# Patient Record
Sex: FEMALE | Race: OTHER | HISPANIC OR LATINO
[De-identification: names, ages, dates, MRNs, and addresses within clinical notes are randomized per-mention and may not be internally consistent; named-entity substitution may affect disease eponyms.]

---

## 2017-01-05 ENCOUNTER — APPOINTMENT (OUTPATIENT)
Dept: INTERNAL MEDICINE | Facility: CLINIC | Age: 42
End: 2017-01-05

## 2017-01-05 ENCOUNTER — LABORATORY RESULT (OUTPATIENT)
Age: 42
End: 2017-01-05

## 2017-01-05 ENCOUNTER — OUTPATIENT (OUTPATIENT)
Dept: OUTPATIENT SERVICES | Facility: HOSPITAL | Age: 42
LOS: 1 days | End: 2017-01-05
Payer: SELF-PAY

## 2017-01-05 VITALS
HEART RATE: 68 BPM | BODY MASS INDEX: 20.96 KG/M2 | SYSTOLIC BLOOD PRESSURE: 120 MMHG | DIASTOLIC BLOOD PRESSURE: 78 MMHG | HEIGHT: 59 IN | WEIGHT: 104 LBS

## 2017-01-05 DIAGNOSIS — J84.10 PULMONARY FIBROSIS, UNSPECIFIED: ICD-10-CM

## 2017-01-05 DIAGNOSIS — L90.5 SCAR CONDITIONS AND FIBROSIS OF SKIN: ICD-10-CM

## 2017-01-05 DIAGNOSIS — R94.8 ABNORMAL RESULTS OF FUNCTION STUDIES OF OTHER ORGANS AND SYSTEMS: ICD-10-CM

## 2017-01-05 DIAGNOSIS — M25.50 PAIN IN UNSPECIFIED JOINT: ICD-10-CM

## 2017-01-05 DIAGNOSIS — Z87.09 PERSONAL HISTORY OF OTHER DISEASES OF THE RESPIRATORY SYSTEM: ICD-10-CM

## 2017-01-05 DIAGNOSIS — I10 ESSENTIAL (PRIMARY) HYPERTENSION: ICD-10-CM

## 2017-01-05 DIAGNOSIS — H53.9 UNSPECIFIED VISUAL DISTURBANCE: ICD-10-CM

## 2017-01-05 DIAGNOSIS — R21 RASH AND OTHER NONSPECIFIC SKIN ERUPTION: ICD-10-CM

## 2017-01-05 DIAGNOSIS — N83.9 NONINFLAMMATORY DISORDER OF OVARY, FALLOPIAN TUBE AND BROAD LIGAMENT, UNSPECIFIED: ICD-10-CM

## 2017-01-05 PROCEDURE — G0463: CPT

## 2017-01-05 PROCEDURE — 86480 TB TEST CELL IMMUN MEASURE: CPT

## 2017-01-06 PROBLEM — N83.9 OVARIAN MASS, RIGHT: Status: ACTIVE | Noted: 2017-01-06

## 2017-01-06 PROBLEM — M25.50 PAIN IN JOINT INVOLVING MULTIPLE SITES: Status: ACTIVE | Noted: 2017-01-06

## 2017-01-06 PROBLEM — H53.9 VISION CHANGES: Status: ACTIVE | Noted: 2017-01-06

## 2017-01-06 PROBLEM — R94.8: Status: ACTIVE | Noted: 2017-01-06

## 2017-01-09 DIAGNOSIS — J84.10 PULMONARY FIBROSIS, UNSPECIFIED: ICD-10-CM

## 2017-01-09 DIAGNOSIS — H53.9 UNSPECIFIED VISUAL DISTURBANCE: ICD-10-CM

## 2017-01-09 DIAGNOSIS — R94.8 ABNORMAL RESULTS OF FUNCTION STUDIES OF OTHER ORGANS AND SYSTEMS: ICD-10-CM

## 2017-01-09 DIAGNOSIS — Q76.7: ICD-10-CM

## 2017-01-09 DIAGNOSIS — Z00.00 ENCOUNTER FOR GENERAL ADULT MEDICAL EXAMINATION WITHOUT ABNORMAL FINDINGS: ICD-10-CM

## 2017-01-09 DIAGNOSIS — M25.50 PAIN IN UNSPECIFIED JOINT: ICD-10-CM

## 2017-01-09 DIAGNOSIS — N83.9 NONINFLAMMATORY DISORDER OF OVARY, FALLOPIAN TUBE AND BROAD LIGAMENT, UNSPECIFIED: ICD-10-CM

## 2017-01-09 DIAGNOSIS — N85.00 ENDOMETRIAL HYPERPLASIA, UNSPECIFIED: ICD-10-CM

## 2017-01-09 LAB
M TB TUBERC IFN-G BLD QL: 0 IU/ML — SIGNIFICANT CHANGE UP
M TB TUBERC IFN-G BLD QL: 0.04 IU/ML — SIGNIFICANT CHANGE UP
M TB TUBERC IFN-G BLD QL: NEGATIVE — SIGNIFICANT CHANGE UP
MITOGEN IGNF BCKGRD COR BLD-ACNC: >10 IU/ML — SIGNIFICANT CHANGE UP

## 2017-04-27 ENCOUNTER — OUTPATIENT (OUTPATIENT)
Dept: OUTPATIENT SERVICES | Facility: HOSPITAL | Age: 42
LOS: 1 days | End: 2017-04-27
Payer: SELF-PAY

## 2017-04-27 ENCOUNTER — APPOINTMENT (OUTPATIENT)
Dept: INTERNAL MEDICINE | Facility: CLINIC | Age: 42
End: 2017-04-27

## 2017-04-27 DIAGNOSIS — I10 ESSENTIAL (PRIMARY) HYPERTENSION: ICD-10-CM

## 2017-04-27 DIAGNOSIS — B35.4 TINEA CORPORIS: ICD-10-CM

## 2017-04-27 PROCEDURE — G0463: CPT

## 2017-04-28 LAB
ALBUMIN SERPL ELPH-MCNC: 4.2 G/DL
ALP BLD-CCNC: 43 U/L
ALT SERPL-CCNC: 14 U/L
ANION GAP SERPL CALC-SCNC: 13 MMOL/L
AST SERPL-CCNC: 19 U/L
BILIRUB SERPL-MCNC: 0.2 MG/DL
BUN SERPL-MCNC: 11 MG/DL
CALCIUM SERPL-MCNC: 9.2 MG/DL
CHLORIDE SERPL-SCNC: 104 MMOL/L
CO2 SERPL-SCNC: 22 MMOL/L
CREAT SERPL-MCNC: 0.74 MG/DL
GLUCOSE SERPL-MCNC: 69 MG/DL
POTASSIUM SERPL-SCNC: 3.7 MMOL/L
PROT SERPL-MCNC: 6.8 G/DL
SODIUM SERPL-SCNC: 139 MMOL/L

## 2017-05-02 ENCOUNTER — APPOINTMENT (OUTPATIENT)
Dept: GASTROENTEROLOGY | Facility: HOSPITAL | Age: 42
End: 2017-05-02

## 2017-05-02 ENCOUNTER — OUTPATIENT (OUTPATIENT)
Dept: OUTPATIENT SERVICES | Facility: HOSPITAL | Age: 42
LOS: 1 days | End: 2017-05-02
Payer: SELF-PAY

## 2017-05-02 VITALS
SYSTOLIC BLOOD PRESSURE: 109 MMHG | DIASTOLIC BLOOD PRESSURE: 74 MMHG | WEIGHT: 105 LBS | RESPIRATION RATE: 16 BRPM | HEART RATE: 54 BPM | BODY MASS INDEX: 21.17 KG/M2 | HEIGHT: 59 IN

## 2017-05-02 DIAGNOSIS — K31.9 DISEASE OF STOMACH AND DUODENUM, UNSPECIFIED: ICD-10-CM

## 2017-05-02 DIAGNOSIS — A04.8 OTHER SPECIFIED BACTERIAL INTESTINAL INFECTIONS: ICD-10-CM

## 2017-05-02 DIAGNOSIS — K30 FUNCTIONAL DYSPEPSIA: ICD-10-CM

## 2017-05-02 PROCEDURE — G0463: CPT

## 2017-05-09 DIAGNOSIS — B35.4 TINEA CORPORIS: ICD-10-CM

## 2017-06-14 ENCOUNTER — FORM ENCOUNTER (OUTPATIENT)
Age: 42
End: 2017-06-14

## 2017-06-15 ENCOUNTER — APPOINTMENT (OUTPATIENT)
Dept: MAMMOGRAPHY | Facility: IMAGING CENTER | Age: 42
End: 2017-06-15

## 2017-06-15 ENCOUNTER — OUTPATIENT (OUTPATIENT)
Dept: OUTPATIENT SERVICES | Facility: HOSPITAL | Age: 42
LOS: 1 days | End: 2017-06-15
Payer: SELF-PAY

## 2017-06-15 ENCOUNTER — APPOINTMENT (OUTPATIENT)
Dept: ULTRASOUND IMAGING | Facility: IMAGING CENTER | Age: 42
End: 2017-06-15

## 2017-06-15 DIAGNOSIS — Z00.8 ENCOUNTER FOR OTHER GENERAL EXAMINATION: ICD-10-CM

## 2017-06-15 DIAGNOSIS — B35.4 TINEA CORPORIS: ICD-10-CM

## 2017-06-15 PROCEDURE — 76642 ULTRASOUND BREAST LIMITED: CPT

## 2017-07-20 ENCOUNTER — APPOINTMENT (OUTPATIENT)
Dept: OBGYN | Facility: CLINIC | Age: 42
End: 2017-07-20

## 2017-07-20 ENCOUNTER — OUTPATIENT (OUTPATIENT)
Dept: OUTPATIENT SERVICES | Facility: HOSPITAL | Age: 42
LOS: 1 days | End: 2017-07-20
Payer: SELF-PAY

## 2017-07-20 VITALS — DIASTOLIC BLOOD PRESSURE: 72 MMHG | SYSTOLIC BLOOD PRESSURE: 110 MMHG | WEIGHT: 110 LBS | BODY MASS INDEX: 22.22 KG/M2

## 2017-07-20 DIAGNOSIS — N76.0 ACUTE VAGINITIS: ICD-10-CM

## 2017-07-20 PROCEDURE — G0463: CPT

## 2017-07-25 DIAGNOSIS — Z01.419 ENCOUNTER FOR GYNECOLOGICAL EXAMINATION (GENERAL) (ROUTINE) WITHOUT ABNORMAL FINDINGS: ICD-10-CM

## 2017-10-12 ENCOUNTER — APPOINTMENT (OUTPATIENT)
Dept: OPHTHALMOLOGY | Facility: CLINIC | Age: 42
End: 2017-10-12

## 2017-12-13 ENCOUNTER — RX RENEWAL (OUTPATIENT)
Age: 42
End: 2017-12-13

## 2017-12-26 ENCOUNTER — FORM ENCOUNTER (OUTPATIENT)
Age: 42
End: 2017-12-26

## 2017-12-27 ENCOUNTER — APPOINTMENT (OUTPATIENT)
Dept: ULTRASOUND IMAGING | Facility: IMAGING CENTER | Age: 42
End: 2017-12-27
Payer: COMMERCIAL

## 2017-12-27 ENCOUNTER — OUTPATIENT (OUTPATIENT)
Dept: OUTPATIENT SERVICES | Facility: HOSPITAL | Age: 42
LOS: 1 days | End: 2017-12-27
Payer: SELF-PAY

## 2017-12-27 DIAGNOSIS — Z00.8 ENCOUNTER FOR OTHER GENERAL EXAMINATION: ICD-10-CM

## 2017-12-27 DIAGNOSIS — N60.01 SOLITARY CYST OF RIGHT BREAST: ICD-10-CM

## 2017-12-27 PROCEDURE — 76641 ULTRASOUND BREAST COMPLETE: CPT

## 2017-12-27 PROCEDURE — 76641 ULTRASOUND BREAST COMPLETE: CPT | Mod: 26,50

## 2018-02-08 ENCOUNTER — APPOINTMENT (OUTPATIENT)
Dept: INTERNAL MEDICINE | Facility: CLINIC | Age: 43
End: 2018-02-08

## 2018-02-08 ENCOUNTER — OUTPATIENT (OUTPATIENT)
Dept: OUTPATIENT SERVICES | Facility: HOSPITAL | Age: 43
LOS: 1 days | End: 2018-02-08
Payer: SELF-PAY

## 2018-02-08 VITALS
BODY MASS INDEX: 22.58 KG/M2 | DIASTOLIC BLOOD PRESSURE: 76 MMHG | SYSTOLIC BLOOD PRESSURE: 110 MMHG | HEART RATE: 64 BPM | HEIGHT: 59 IN | OXYGEN SATURATION: 98 % | WEIGHT: 112 LBS

## 2018-02-08 DIAGNOSIS — I10 ESSENTIAL (PRIMARY) HYPERTENSION: ICD-10-CM

## 2018-02-08 DIAGNOSIS — R10.30 LOWER ABDOMINAL PAIN, UNSPECIFIED: ICD-10-CM

## 2018-02-08 DIAGNOSIS — G47.9 SLEEP DISORDER, UNSPECIFIED: ICD-10-CM

## 2018-02-08 DIAGNOSIS — N85.00 ENDOMETRIAL HYPERPLASIA, UNSPECIFIED: ICD-10-CM

## 2018-02-08 PROCEDURE — 90471 IMMUNIZATION ADMIN: CPT

## 2018-02-08 PROCEDURE — G0463: CPT

## 2018-02-08 PROCEDURE — 90715 TDAP VACCINE 7 YRS/> IM: CPT

## 2018-02-08 RX ORDER — TERBINAFINE HYDROCHLORIDE 250 MG/1
250 TABLET ORAL DAILY
Qty: 14 | Refills: 0 | Status: DISCONTINUED | COMMUNITY
Start: 2017-04-28 | End: 2018-02-08

## 2018-02-20 ENCOUNTER — APPOINTMENT (OUTPATIENT)
Dept: INTERNAL MEDICINE | Facility: CLINIC | Age: 43
End: 2018-02-20

## 2018-02-20 ENCOUNTER — OUTPATIENT (OUTPATIENT)
Dept: OUTPATIENT SERVICES | Facility: HOSPITAL | Age: 43
LOS: 1 days | End: 2018-02-20
Payer: SELF-PAY

## 2018-02-20 DIAGNOSIS — I10 ESSENTIAL (PRIMARY) HYPERTENSION: ICD-10-CM

## 2018-02-20 DIAGNOSIS — F41.9 ANXIETY DISORDER, UNSPECIFIED: ICD-10-CM

## 2018-02-20 PROCEDURE — G0463: CPT

## 2018-02-22 DIAGNOSIS — Z23 ENCOUNTER FOR IMMUNIZATION: ICD-10-CM

## 2018-02-22 DIAGNOSIS — N85.00 ENDOMETRIAL HYPERPLASIA, UNSPECIFIED: ICD-10-CM

## 2018-02-22 DIAGNOSIS — G47.9 SLEEP DISORDER, UNSPECIFIED: ICD-10-CM

## 2018-02-22 DIAGNOSIS — R10.30 LOWER ABDOMINAL PAIN, UNSPECIFIED: ICD-10-CM

## 2018-02-23 ENCOUNTER — OUTPATIENT (OUTPATIENT)
Dept: OUTPATIENT SERVICES | Facility: HOSPITAL | Age: 43
LOS: 1 days | End: 2018-02-23

## 2018-02-23 ENCOUNTER — APPOINTMENT (OUTPATIENT)
Dept: OPHTHALMOLOGY | Facility: CLINIC | Age: 43
End: 2018-02-23

## 2018-03-09 ENCOUNTER — OUTPATIENT (OUTPATIENT)
Dept: OUTPATIENT SERVICES | Facility: HOSPITAL | Age: 43
LOS: 1 days | End: 2018-03-09
Payer: SELF-PAY

## 2018-03-09 ENCOUNTER — APPOINTMENT (OUTPATIENT)
Dept: INTERNAL MEDICINE | Facility: CLINIC | Age: 43
End: 2018-03-09

## 2018-03-09 VITALS
HEIGHT: 59 IN | SYSTOLIC BLOOD PRESSURE: 152 MMHG | HEART RATE: 67 BPM | DIASTOLIC BLOOD PRESSURE: 82 MMHG | WEIGHT: 113 LBS | OXYGEN SATURATION: 97 % | BODY MASS INDEX: 22.78 KG/M2

## 2018-03-09 DIAGNOSIS — I10 ESSENTIAL (PRIMARY) HYPERTENSION: ICD-10-CM

## 2018-03-09 DIAGNOSIS — Z83.3 FAMILY HISTORY OF DIABETES MELLITUS: ICD-10-CM

## 2018-03-09 LAB
ALBUMIN SERPL ELPH-MCNC: 4.5 G/DL
ALP BLD-CCNC: 38 U/L
ALT SERPL-CCNC: 16 U/L
ANION GAP SERPL CALC-SCNC: 11 MMOL/L
AST SERPL-CCNC: 29 U/L
BASOPHILS # BLD AUTO: 0.06 K/UL
BASOPHILS NFR BLD AUTO: 1 %
BILIRUB SERPL-MCNC: 0.3 MG/DL
BUN SERPL-MCNC: 8 MG/DL
CALCIUM SERPL-MCNC: 9.1 MG/DL
CHLORIDE SERPL-SCNC: 100 MMOL/L
CHOLEST SERPL-MCNC: 178 MG/DL
CHOLEST/HDLC SERPL: 2.9 RATIO
CO2 SERPL-SCNC: 25 MMOL/L
CREAT SERPL-MCNC: 0.67 MG/DL
EOSINOPHIL # BLD AUTO: 0.26 K/UL
EOSINOPHIL NFR BLD AUTO: 4.3 %
GLUCOSE SERPL-MCNC: 80 MG/DL
HCT VFR BLD CALC: 39.4 %
HDLC SERPL-MCNC: 62 MG/DL
HGB BLD-MCNC: 13.4 G/DL
IMM GRANULOCYTES NFR BLD AUTO: 0 %
LDLC SERPL CALC-MCNC: 104 MG/DL
LYMPHOCYTES # BLD AUTO: 2.24 K/UL
LYMPHOCYTES NFR BLD AUTO: 36.7 %
MAN DIFF?: NORMAL
MCHC RBC-ENTMCNC: 31.3 PG
MCHC RBC-ENTMCNC: 34 GM/DL
MCV RBC AUTO: 92.1 FL
MONOCYTES # BLD AUTO: 0.38 K/UL
MONOCYTES NFR BLD AUTO: 6.2 %
NEUTROPHILS # BLD AUTO: 3.17 K/UL
NEUTROPHILS NFR BLD AUTO: 51.8 %
PLATELET # BLD AUTO: 310 K/UL
POTASSIUM SERPL-SCNC: 4 MMOL/L
PROT SERPL-MCNC: 7.7 G/DL
RBC # BLD: 4.28 M/UL
RBC # FLD: 12.7 %
SODIUM SERPL-SCNC: 136 MMOL/L
TRIGL SERPL-MCNC: 58 MG/DL
WBC # FLD AUTO: 6.11 K/UL

## 2018-03-09 PROCEDURE — G0463: CPT

## 2018-03-12 LAB
CCP AB SER IA-ACNC: <8 UNITS
ERYTHROCYTE [SEDIMENTATION RATE] IN BLOOD BY WESTERGREN METHOD: 2 MM/HR
HBA1C MFR BLD HPLC: 4.9 %
HCV AB SER QL: NONREACTIVE
HCV S/CO RATIO: 0.18 S/CO
HIV1+2 AB SPEC QL IA.RAPID: NONREACTIVE
RF+CCP IGG SER-IMP: NEGATIVE
RHEUMATOID FACT SER QL: <7 IU/ML

## 2018-03-14 DIAGNOSIS — H52.4 PRESBYOPIA: ICD-10-CM

## 2018-03-15 ENCOUNTER — LABORATORY RESULT (OUTPATIENT)
Age: 43
End: 2018-03-15

## 2018-03-15 ENCOUNTER — OUTPATIENT (OUTPATIENT)
Dept: OUTPATIENT SERVICES | Facility: HOSPITAL | Age: 43
LOS: 1 days | End: 2018-03-15
Payer: SELF-PAY

## 2018-03-15 ENCOUNTER — APPOINTMENT (OUTPATIENT)
Dept: INTERNAL MEDICINE | Facility: CLINIC | Age: 43
End: 2018-03-15

## 2018-03-15 VITALS
BODY MASS INDEX: 22.78 KG/M2 | OXYGEN SATURATION: 99 % | HEIGHT: 59 IN | TEMPERATURE: 98.8 F | SYSTOLIC BLOOD PRESSURE: 130 MMHG | HEART RATE: 86 BPM | DIASTOLIC BLOOD PRESSURE: 70 MMHG | WEIGHT: 113 LBS

## 2018-03-15 DIAGNOSIS — I10 ESSENTIAL (PRIMARY) HYPERTENSION: ICD-10-CM

## 2018-03-15 PROCEDURE — G0463: CPT

## 2018-03-16 LAB — TSH SERPL-ACNC: 2.23 UIU/ML

## 2018-03-21 DIAGNOSIS — M19.90 UNSPECIFIED OSTEOARTHRITIS, UNSPECIFIED SITE: ICD-10-CM

## 2018-03-21 DIAGNOSIS — F41.9 ANXIETY DISORDER, UNSPECIFIED: ICD-10-CM

## 2018-03-22 ENCOUNTER — FORM ENCOUNTER (OUTPATIENT)
Age: 43
End: 2018-03-22

## 2018-03-23 ENCOUNTER — OUTPATIENT (OUTPATIENT)
Dept: OUTPATIENT SERVICES | Facility: HOSPITAL | Age: 43
LOS: 1 days | End: 2018-03-23
Payer: SELF-PAY

## 2018-03-23 ENCOUNTER — APPOINTMENT (OUTPATIENT)
Dept: INTERNAL MEDICINE | Facility: CLINIC | Age: 43
End: 2018-03-23
Payer: SELF-PAY

## 2018-03-23 ENCOUNTER — APPOINTMENT (OUTPATIENT)
Dept: RADIOLOGY | Facility: CLINIC | Age: 43
End: 2018-03-23
Payer: COMMERCIAL

## 2018-03-23 DIAGNOSIS — I10 ESSENTIAL (PRIMARY) HYPERTENSION: ICD-10-CM

## 2018-03-23 DIAGNOSIS — M54.9 DORSALGIA, UNSPECIFIED: ICD-10-CM

## 2018-03-23 DIAGNOSIS — M17.0 BILATERAL PRIMARY OSTEOARTHRITIS OF KNEE: ICD-10-CM

## 2018-03-23 PROCEDURE — 72100 X-RAY EXAM L-S SPINE 2/3 VWS: CPT

## 2018-03-23 PROCEDURE — 73130 X-RAY EXAM OF HAND: CPT | Mod: 26,50

## 2018-03-23 PROCEDURE — G0463: CPT

## 2018-03-23 PROCEDURE — 73130 X-RAY EXAM OF HAND: CPT

## 2018-03-23 PROCEDURE — 72100 X-RAY EXAM L-S SPINE 2/3 VWS: CPT | Mod: 26

## 2018-03-23 PROCEDURE — ZZZZZ: CPT

## 2018-03-23 PROCEDURE — 72070 X-RAY EXAM THORAC SPINE 2VWS: CPT | Mod: 26

## 2018-03-23 PROCEDURE — 72070 X-RAY EXAM THORAC SPINE 2VWS: CPT

## 2018-03-30 DIAGNOSIS — F41.9 ANXIETY DISORDER, UNSPECIFIED: ICD-10-CM

## 2018-04-20 ENCOUNTER — OUTPATIENT (OUTPATIENT)
Dept: OUTPATIENT SERVICES | Facility: HOSPITAL | Age: 43
LOS: 1 days | End: 2018-04-20
Payer: SELF-PAY

## 2018-04-20 ENCOUNTER — APPOINTMENT (OUTPATIENT)
Dept: INTERNAL MEDICINE | Facility: CLINIC | Age: 43
End: 2018-04-20

## 2018-04-20 VITALS
HEART RATE: 68 BPM | WEIGHT: 111 LBS | SYSTOLIC BLOOD PRESSURE: 114 MMHG | DIASTOLIC BLOOD PRESSURE: 72 MMHG | HEIGHT: 59 IN | BODY MASS INDEX: 22.38 KG/M2 | OXYGEN SATURATION: 97 %

## 2018-04-20 DIAGNOSIS — I10 ESSENTIAL (PRIMARY) HYPERTENSION: ICD-10-CM

## 2018-04-20 DIAGNOSIS — R09.82 POSTNASAL DRIP: ICD-10-CM

## 2018-04-20 DIAGNOSIS — M17.0 BILATERAL PRIMARY OSTEOARTHRITIS OF KNEE: ICD-10-CM

## 2018-04-20 PROCEDURE — G0463: CPT

## 2018-04-25 DIAGNOSIS — F41.9 ANXIETY DISORDER, UNSPECIFIED: ICD-10-CM

## 2018-04-25 DIAGNOSIS — R09.82 POSTNASAL DRIP: ICD-10-CM

## 2018-04-25 DIAGNOSIS — M19.90 UNSPECIFIED OSTEOARTHRITIS, UNSPECIFIED SITE: ICD-10-CM

## 2018-04-27 ENCOUNTER — APPOINTMENT (OUTPATIENT)
Dept: RHEUMATOLOGY | Facility: HOSPITAL | Age: 43
End: 2018-04-27

## 2018-04-27 ENCOUNTER — OUTPATIENT (OUTPATIENT)
Dept: OUTPATIENT SERVICES | Facility: HOSPITAL | Age: 43
LOS: 1 days | End: 2018-04-27
Payer: SELF-PAY

## 2018-04-27 VITALS
DIASTOLIC BLOOD PRESSURE: 71 MMHG | WEIGHT: 111 LBS | HEART RATE: 61 BPM | SYSTOLIC BLOOD PRESSURE: 108 MMHG | BODY MASS INDEX: 22.38 KG/M2 | HEIGHT: 59 IN

## 2018-04-27 DIAGNOSIS — M06.9 RHEUMATOID ARTHRITIS, UNSPECIFIED: ICD-10-CM

## 2018-04-27 PROCEDURE — G0463: CPT

## 2018-04-30 ENCOUNTER — APPOINTMENT (OUTPATIENT)
Dept: PULMONOLOGY | Facility: CLINIC | Age: 43
End: 2018-04-30
Payer: COMMERCIAL

## 2018-04-30 VITALS
DIASTOLIC BLOOD PRESSURE: 70 MMHG | BODY MASS INDEX: 22.38 KG/M2 | HEART RATE: 67 BPM | TEMPERATURE: 99.2 F | HEIGHT: 59 IN | RESPIRATION RATE: 15 BRPM | WEIGHT: 111 LBS | SYSTOLIC BLOOD PRESSURE: 110 MMHG

## 2018-04-30 DIAGNOSIS — F51.04 PSYCHOPHYSIOLOGIC INSOMNIA: ICD-10-CM

## 2018-04-30 PROCEDURE — 99244 OFF/OP CNSLTJ NEW/EST MOD 40: CPT | Mod: GC

## 2018-05-02 ENCOUNTER — APPOINTMENT (OUTPATIENT)
Dept: PULMONOLOGY | Facility: CLINIC | Age: 43
End: 2018-05-02

## 2018-05-07 DIAGNOSIS — M79.7 FIBROMYALGIA: ICD-10-CM

## 2018-05-07 DIAGNOSIS — M17.0 BILATERAL PRIMARY OSTEOARTHRITIS OF KNEE: ICD-10-CM

## 2018-05-26 ENCOUNTER — MOBILE ON CALL (OUTPATIENT)
Age: 43
End: 2018-05-26
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
## 2018-05-26 DIAGNOSIS — T78.3XXA ANGIONEUROTIC EDEMA, INITIAL ENCOUNTER: ICD-10-CM

## 2018-05-26 RX ORDER — METHYLPREDNISOLONE 4 MG/1
4 TABLET ORAL
Qty: 1 | Refills: 0 | Status: COMPLETED | COMMUNITY
Start: 2018-05-26 | End: 2018-06-02

## 2018-06-01 ENCOUNTER — LABORATORY RESULT (OUTPATIENT)
Age: 43
End: 2018-06-01

## 2018-06-01 ENCOUNTER — OUTPATIENT (OUTPATIENT)
Dept: OUTPATIENT SERVICES | Facility: HOSPITAL | Age: 43
LOS: 1 days | End: 2018-06-01
Payer: SELF-PAY

## 2018-06-01 ENCOUNTER — APPOINTMENT (OUTPATIENT)
Dept: INTERNAL MEDICINE | Facility: CLINIC | Age: 43
End: 2018-06-01
Payer: SELF-PAY

## 2018-06-01 DIAGNOSIS — I10 ESSENTIAL (PRIMARY) HYPERTENSION: ICD-10-CM

## 2018-06-01 PROCEDURE — G0463: CPT

## 2018-06-01 PROCEDURE — 84550 ASSAY OF BLOOD/URIC ACID: CPT

## 2018-06-01 PROCEDURE — ZZZZZ: CPT

## 2018-06-01 PROCEDURE — 83655 ASSAY OF LEAD: CPT

## 2018-06-01 PROCEDURE — 86617 LYME DISEASE ANTIBODY: CPT

## 2018-06-01 PROCEDURE — 86618 LYME DISEASE ANTIBODY: CPT

## 2018-06-02 LAB — URATE SERPL-MCNC: 3.9 MG/DL — SIGNIFICANT CHANGE UP (ref 2.5–7)

## 2018-06-03 LAB
B BURGDOR C6 AB SER-ACNC: NEGATIVE — SIGNIFICANT CHANGE UP
B BURGDOR IGG+IGM SER-ACNC: 0.05 INDEX — SIGNIFICANT CHANGE UP (ref 0.01–0.89)

## 2018-06-05 LAB — LEAD BLD-MCNC: <1 UG/DL — SIGNIFICANT CHANGE UP (ref 0–19)

## 2018-06-08 DIAGNOSIS — F41.9 ANXIETY DISORDER, UNSPECIFIED: ICD-10-CM

## 2018-06-08 LAB
B BURGDOR AB SER-IMP: NEGATIVE — SIGNIFICANT CHANGE UP
B BURGDOR18KD IGG SER QL IB: SIGNIFICANT CHANGE UP
B BURGDOR23KD IGG SER QL IB: SIGNIFICANT CHANGE UP
B BURGDOR23KD IGM SER QL IB: PRESENT
B BURGDOR28KD AB SER QL IB: SIGNIFICANT CHANGE UP
B BURGDOR28KD IGG SER QL IB: SIGNIFICANT CHANGE UP
B BURGDOR30KD AB SER QL IB: SIGNIFICANT CHANGE UP
B BURGDOR30KD IGG SER QL IB: PRESENT
B BURGDOR31KD IGG SER QL IB: SIGNIFICANT CHANGE UP
B BURGDOR31KD IGM SER QL IB: SIGNIFICANT CHANGE UP
B BURGDOR39KD IGG SER QL IB: SIGNIFICANT CHANGE UP
B BURGDOR39KD IGM SER QL IB: SIGNIFICANT CHANGE UP
B BURGDOR41KD IGG SER QL IB: SIGNIFICANT CHANGE UP
B BURGDOR41KD IGM SER QL IB: SIGNIFICANT CHANGE UP
B BURGDOR45KD AB SER QL IB: SIGNIFICANT CHANGE UP
B BURGDOR45KD IGG SER QL IB: SIGNIFICANT CHANGE UP
B BURGDOR58KD AB SER QL IB: SIGNIFICANT CHANGE UP
B BURGDOR58KD IGG SER QL IB: SIGNIFICANT CHANGE UP
B BURGDOR66KD IGG SER QL IB: SIGNIFICANT CHANGE UP
B BURGDOR66KD IGM SER QL IB: SIGNIFICANT CHANGE UP
B BURGDOR93KD IGG SER QL IB: SIGNIFICANT CHANGE UP
B BURGDOR93KD IGM SER QL IB: SIGNIFICANT CHANGE UP
LYME AB WESTERN BLOT 18KD IGM: SIGNIFICANT CHANGE UP
LYME WB IGM INTERPRETATION: NEGATIVE — SIGNIFICANT CHANGE UP

## 2018-06-29 ENCOUNTER — APPOINTMENT (OUTPATIENT)
Dept: RHEUMATOLOGY | Facility: HOSPITAL | Age: 43
End: 2018-06-29

## 2018-06-29 ENCOUNTER — OUTPATIENT (OUTPATIENT)
Dept: OUTPATIENT SERVICES | Facility: HOSPITAL | Age: 43
LOS: 1 days | End: 2018-06-29
Payer: SELF-PAY

## 2018-06-29 VITALS
HEIGHT: 59 IN | WEIGHT: 110 LBS | HEART RATE: 53 BPM | SYSTOLIC BLOOD PRESSURE: 108 MMHG | BODY MASS INDEX: 22.18 KG/M2 | DIASTOLIC BLOOD PRESSURE: 75 MMHG

## 2018-06-29 DIAGNOSIS — M06.9 RHEUMATOID ARTHRITIS, UNSPECIFIED: ICD-10-CM

## 2018-06-29 DIAGNOSIS — L23.9 ALLERGIC CONTACT DERMATITIS, UNSPECIFIED CAUSE: ICD-10-CM

## 2018-06-29 PROCEDURE — G0463: CPT

## 2018-06-29 RX ORDER — MELOXICAM 15 MG/1
15 TABLET ORAL DAILY
Qty: 30 | Refills: 1 | Status: DISCONTINUED | COMMUNITY
Start: 2018-03-09 | End: 2018-06-29

## 2018-06-29 RX ORDER — FLUTICASONE PROPIONATE 50 UG/1
50 SPRAY, METERED NASAL DAILY
Qty: 1 | Refills: 3 | Status: DISCONTINUED | COMMUNITY
Start: 2018-04-20 | End: 2018-06-29

## 2018-06-29 RX ORDER — ASCORBIC ACID 500 MG
500 TABLET ORAL
Refills: 0 | Status: DISCONTINUED | COMMUNITY
Start: 2018-02-08 | End: 2018-06-29

## 2018-08-23 ENCOUNTER — FORM ENCOUNTER (OUTPATIENT)
Age: 43
End: 2018-08-23

## 2018-08-24 ENCOUNTER — APPOINTMENT (OUTPATIENT)
Dept: MAMMOGRAPHY | Facility: IMAGING CENTER | Age: 43
End: 2018-08-24
Payer: COMMERCIAL

## 2018-08-24 ENCOUNTER — OUTPATIENT (OUTPATIENT)
Dept: OUTPATIENT SERVICES | Facility: HOSPITAL | Age: 43
LOS: 1 days | End: 2018-08-24
Payer: SELF-PAY

## 2018-08-24 ENCOUNTER — APPOINTMENT (OUTPATIENT)
Dept: ULTRASOUND IMAGING | Facility: IMAGING CENTER | Age: 43
End: 2018-08-24
Payer: COMMERCIAL

## 2018-08-24 DIAGNOSIS — N60.01 SOLITARY CYST OF RIGHT BREAST: ICD-10-CM

## 2018-08-24 PROCEDURE — 77066 DX MAMMO INCL CAD BI: CPT | Mod: 26

## 2018-08-24 PROCEDURE — 76642 ULTRASOUND BREAST LIMITED: CPT

## 2018-08-24 PROCEDURE — G0279: CPT

## 2018-08-24 PROCEDURE — 76642 ULTRASOUND BREAST LIMITED: CPT | Mod: 26,LT

## 2018-08-24 PROCEDURE — G0279: CPT | Mod: 26

## 2018-08-24 PROCEDURE — 77066 DX MAMMO INCL CAD BI: CPT

## 2018-09-06 ENCOUNTER — APPOINTMENT (OUTPATIENT)
Dept: INTERNAL MEDICINE | Facility: CLINIC | Age: 43
End: 2018-09-06

## 2018-09-06 ENCOUNTER — OUTPATIENT (OUTPATIENT)
Dept: OUTPATIENT SERVICES | Facility: HOSPITAL | Age: 43
LOS: 1 days | End: 2018-09-06
Payer: SELF-PAY

## 2018-09-06 VITALS
HEIGHT: 59 IN | SYSTOLIC BLOOD PRESSURE: 102 MMHG | HEART RATE: 58 BPM | DIASTOLIC BLOOD PRESSURE: 60 MMHG | WEIGHT: 111 LBS | OXYGEN SATURATION: 98 % | BODY MASS INDEX: 22.38 KG/M2

## 2018-09-06 DIAGNOSIS — I10 ESSENTIAL (PRIMARY) HYPERTENSION: ICD-10-CM

## 2018-09-06 PROCEDURE — G0463: CPT

## 2018-09-06 NOTE — REVIEW OF SYSTEMS
[Muscle Weakness] : muscle weakness [Muscle Pain] : muscle pain [Fever] : no fever [Chills] : no chills [Fatigue] : no fatigue [Chest Pain] : no chest pain [Palpitations] : no palpitations [Shortness Of Breath] : no shortness of breath [Cough] : no cough [Abdominal Pain] : no abdominal pain [Nausea] : no nausea [Joint Stiffness] : no joint stiffness [Joint Swelling] : no joint swelling [FreeTextEntry9] : Muscle and joint pain is present but less.

## 2018-09-06 NOTE — PHYSICAL EXAM
[No Acute Distress] : no acute distress [Well Nourished] : well nourished [Well Developed] : well developed [Well-Appearing] : well-appearing [No Respiratory Distress] : no respiratory distress  [Clear to Auscultation] : lungs were clear to auscultation bilaterally [No Accessory Muscle Use] : no accessory muscle use [Normal Rate] : normal rate  [Regular Rhythm] : with a regular rhythm [Normal S1, S2] : normal S1 and S2 [No Murmur] : no murmur heard [Soft] : abdomen soft [Non Tender] : non-tender [Non-distended] : non-distended [No HSM] : no HSM [No Joint Swelling] : no joint swelling [de-identified] : Knees show no erythema or swelling.

## 2018-09-06 NOTE — ASSESSMENT
[FreeTextEntry1] : 42F presenting for generalized aches of now found to be fibromyalgia.\par \par # Fibromyalgia\par Pt presents for follow up for fibromyalgia, now improved with exercise.  Pt is reluctant to accept diagnosis as she is afraid it will make her "feel sick all the time".  Discussed with pt about the disease and how there are treatments for pain and the disease.  Pt expressed understanding.\par - Encouraged pt to continue exercise as this is helping\par - Discussed options for SSRI/TCA or CBT today, however pt is not open to this at this time\par - Given that pt is improving will continue current therapy.\par - Pt states she did stop seeing Dr. Colleti as she was getting better, however worry that this might have been premature.  Will discuss with Dr. Colleti.\par - Given association with colder weather and isolation, pt should return to clinic in November (10 wks) for follow up to ensure symptoms are not returning.\par - Pt also has associated fatigue which is better with sleep hygene and exercise.\par \par # Acne\par Pt has adult acne, few comedones seen on face.\par - Encouraged pt to use OTC remedies and that this was normal with stress.\par \par \par # HCM\par - Pt due for pap, GYN referral given today\par - RTC in 10 weeks\par Discussed with Dr. Conner

## 2018-09-06 NOTE — HISTORY OF PRESENT ILLNESS
[de-identified] : 42F PMH R ovarian cyst, breast cyst presents for follow up.  Pt has a number of issues including fibromyalgia diagnosed recently and insomnia.  \par \par # Fibromyalgia\par Pt was last seen by rheumatology in June and since has been excercising more.  She states that since starting to exercise more, her pain has been better.  She states the pain is best right after she starts to exercise.  \par \par # Insomnia\par Pt states that she has been sleeping more at night, 5-6 hrs.  She states she is taking less melatonin now and has seen improvements.  She does have nights when she does not sleep a lot, however this is less frequent than before\par \par # Acne\par Pt is worried about acne on her face.

## 2018-09-14 DIAGNOSIS — R93.5 ABNORMAL FINDINGS ON DIAGNOSTIC IMAGING OF OTHER ABDOMINAL REGIONS, INCLUDING RETROPERITONEUM: ICD-10-CM

## 2018-10-01 ENCOUNTER — LABORATORY RESULT (OUTPATIENT)
Age: 43
End: 2018-10-01

## 2018-10-02 ENCOUNTER — APPOINTMENT (OUTPATIENT)
Dept: OBGYN | Facility: CLINIC | Age: 43
End: 2018-10-02
Payer: COMMERCIAL

## 2018-10-02 ENCOUNTER — OUTPATIENT (OUTPATIENT)
Dept: OUTPATIENT SERVICES | Facility: HOSPITAL | Age: 43
LOS: 1 days | End: 2018-10-02
Payer: SELF-PAY

## 2018-10-02 ENCOUNTER — LABORATORY RESULT (OUTPATIENT)
Age: 43
End: 2018-10-02

## 2018-10-02 VITALS — DIASTOLIC BLOOD PRESSURE: 70 MMHG | BODY MASS INDEX: 22.82 KG/M2 | WEIGHT: 113 LBS | SYSTOLIC BLOOD PRESSURE: 104 MMHG

## 2018-10-02 DIAGNOSIS — N76.0 ACUTE VAGINITIS: ICD-10-CM

## 2018-10-02 PROCEDURE — 99396 PREV VISIT EST AGE 40-64: CPT

## 2018-10-02 PROCEDURE — G0463: CPT

## 2018-10-02 PROCEDURE — 87491 CHLMYD TRACH DNA AMP PROBE: CPT

## 2018-10-02 PROCEDURE — 87624 HPV HI-RISK TYP POOLED RSLT: CPT

## 2018-10-02 PROCEDURE — 87591 N.GONORRHOEAE DNA AMP PROB: CPT

## 2018-10-02 PROCEDURE — 87800 DETECT AGNT MULT DNA DIREC: CPT

## 2018-10-03 LAB
C TRACH RRNA SPEC QL NAA+PROBE: SIGNIFICANT CHANGE UP
CANDIDA AB TITR SER: SIGNIFICANT CHANGE UP
G VAGINALIS DNA SPEC QL NAA+PROBE: DETECTED
HPV HIGH+LOW RISK DNA PNL CVX: SIGNIFICANT CHANGE UP
N GONORRHOEA RRNA SPEC QL NAA+PROBE: SIGNIFICANT CHANGE UP
SPECIMEN SOURCE: SIGNIFICANT CHANGE UP
T VAGINALIS SPEC QL WET PREP: SIGNIFICANT CHANGE UP

## 2018-10-05 ENCOUNTER — MESSAGE (OUTPATIENT)
Age: 43
End: 2018-10-05

## 2018-10-05 LAB — CYTOLOGY SPEC DOC CYTO: SIGNIFICANT CHANGE UP

## 2018-10-15 DIAGNOSIS — Z01.419 ENCOUNTER FOR GYNECOLOGICAL EXAMINATION (GENERAL) (ROUTINE) WITHOUT ABNORMAL FINDINGS: ICD-10-CM

## 2019-01-12 ENCOUNTER — EMERGENCY (EMERGENCY)
Facility: HOSPITAL | Age: 44
LOS: 1 days | Discharge: HOME | End: 2019-01-12
Attending: EMERGENCY MEDICINE
Payer: MEDICAID

## 2019-01-12 VITALS
HEIGHT: 63 IN | TEMPERATURE: 100 F | HEART RATE: 110 BPM | SYSTOLIC BLOOD PRESSURE: 130 MMHG | DIASTOLIC BLOOD PRESSURE: 72 MMHG | OXYGEN SATURATION: 99 % | RESPIRATION RATE: 20 BRPM | WEIGHT: 110.01 LBS

## 2019-01-12 VITALS
OXYGEN SATURATION: 100 % | HEART RATE: 82 BPM | DIASTOLIC BLOOD PRESSURE: 60 MMHG | SYSTOLIC BLOOD PRESSURE: 101 MMHG | TEMPERATURE: 100 F | RESPIRATION RATE: 16 BRPM

## 2019-01-12 LAB
ALBUMIN SERPL ELPH-MCNC: 4.7 G/DL — SIGNIFICANT CHANGE UP (ref 3.3–5)
ALP SERPL-CCNC: 50 U/L — SIGNIFICANT CHANGE UP (ref 40–120)
ALT FLD-CCNC: 14 U/L — SIGNIFICANT CHANGE UP (ref 10–45)
ANION GAP SERPL CALC-SCNC: 14 MMOL/L — SIGNIFICANT CHANGE UP (ref 5–17)
AST SERPL-CCNC: 16 U/L — SIGNIFICANT CHANGE UP (ref 10–40)
BASOPHILS # BLD AUTO: 0 K/UL — SIGNIFICANT CHANGE UP (ref 0–0.2)
BASOPHILS NFR BLD AUTO: 0.1 % — SIGNIFICANT CHANGE UP (ref 0–2)
BILIRUB SERPL-MCNC: 0.5 MG/DL — SIGNIFICANT CHANGE UP (ref 0.2–1.2)
BUN SERPL-MCNC: 6 MG/DL — LOW (ref 7–23)
CALCIUM SERPL-MCNC: 9.3 MG/DL — SIGNIFICANT CHANGE UP (ref 8.4–10.5)
CHLORIDE SERPL-SCNC: 100 MMOL/L — SIGNIFICANT CHANGE UP (ref 96–108)
CO2 SERPL-SCNC: 22 MMOL/L — SIGNIFICANT CHANGE UP (ref 22–31)
CREAT SERPL-MCNC: 0.69 MG/DL — SIGNIFICANT CHANGE UP (ref 0.5–1.3)
EOSINOPHIL # BLD AUTO: 0 K/UL — SIGNIFICANT CHANGE UP (ref 0–0.5)
EOSINOPHIL NFR BLD AUTO: 0.1 % — SIGNIFICANT CHANGE UP (ref 0–6)
GAS PNL BLDV: SIGNIFICANT CHANGE UP
GLUCOSE SERPL-MCNC: 122 MG/DL — HIGH (ref 70–99)
HCT VFR BLD CALC: 41 % — SIGNIFICANT CHANGE UP (ref 34.5–45)
HGB BLD-MCNC: 14.1 G/DL — SIGNIFICANT CHANGE UP (ref 11.5–15.5)
LYMPHOCYTES # BLD AUTO: 1 K/UL — SIGNIFICANT CHANGE UP (ref 1–3.3)
LYMPHOCYTES # BLD AUTO: 5.8 % — LOW (ref 13–44)
MCHC RBC-ENTMCNC: 31.1 PG — SIGNIFICANT CHANGE UP (ref 27–34)
MCHC RBC-ENTMCNC: 34.3 GM/DL — SIGNIFICANT CHANGE UP (ref 32–36)
MCV RBC AUTO: 90.7 FL — SIGNIFICANT CHANGE UP (ref 80–100)
MONOCYTES # BLD AUTO: 1 K/UL — HIGH (ref 0–0.9)
MONOCYTES NFR BLD AUTO: 5.8 % — SIGNIFICANT CHANGE UP (ref 2–14)
NEUTROPHILS # BLD AUTO: 14.6 K/UL — HIGH (ref 1.8–7.4)
NEUTROPHILS NFR BLD AUTO: 88.2 % — HIGH (ref 43–77)
PLATELET # BLD AUTO: 270 K/UL — SIGNIFICANT CHANGE UP (ref 150–400)
POTASSIUM SERPL-MCNC: 3.5 MMOL/L — SIGNIFICANT CHANGE UP (ref 3.5–5.3)
POTASSIUM SERPL-SCNC: 3.5 MMOL/L — SIGNIFICANT CHANGE UP (ref 3.5–5.3)
PROT SERPL-MCNC: 8.3 G/DL — SIGNIFICANT CHANGE UP (ref 6–8.3)
RBC # BLD: 4.52 M/UL — SIGNIFICANT CHANGE UP (ref 3.8–5.2)
RBC # FLD: 11.2 % — SIGNIFICANT CHANGE UP (ref 10.3–14.5)
SODIUM SERPL-SCNC: 136 MMOL/L — SIGNIFICANT CHANGE UP (ref 135–145)
WBC # BLD: 16.5 K/UL — HIGH (ref 3.8–10.5)
WBC # FLD AUTO: 16.5 K/UL — HIGH (ref 3.8–10.5)

## 2019-01-12 PROCEDURE — 84132 ASSAY OF SERUM POTASSIUM: CPT

## 2019-01-12 PROCEDURE — 84295 ASSAY OF SERUM SODIUM: CPT

## 2019-01-12 PROCEDURE — 82947 ASSAY GLUCOSE BLOOD QUANT: CPT

## 2019-01-12 PROCEDURE — 82330 ASSAY OF CALCIUM: CPT

## 2019-01-12 PROCEDURE — 99284 EMERGENCY DEPT VISIT MOD MDM: CPT

## 2019-01-12 PROCEDURE — 80053 COMPREHEN METABOLIC PANEL: CPT

## 2019-01-12 PROCEDURE — 99284 EMERGENCY DEPT VISIT MOD MDM: CPT | Mod: 25

## 2019-01-12 PROCEDURE — 83605 ASSAY OF LACTIC ACID: CPT

## 2019-01-12 PROCEDURE — 96374 THER/PROPH/DIAG INJ IV PUSH: CPT | Mod: XU

## 2019-01-12 PROCEDURE — 85014 HEMATOCRIT: CPT

## 2019-01-12 PROCEDURE — 82803 BLOOD GASES ANY COMBINATION: CPT

## 2019-01-12 PROCEDURE — 85027 COMPLETE CBC AUTOMATED: CPT

## 2019-01-12 PROCEDURE — 96375 TX/PRO/DX INJ NEW DRUG ADDON: CPT

## 2019-01-12 PROCEDURE — 70487 CT MAXILLOFACIAL W/DYE: CPT

## 2019-01-12 PROCEDURE — 82435 ASSAY OF BLOOD CHLORIDE: CPT

## 2019-01-12 PROCEDURE — 70487 CT MAXILLOFACIAL W/DYE: CPT | Mod: 26

## 2019-01-12 RX ORDER — ONDANSETRON 8 MG/1
4 TABLET, FILM COATED ORAL ONCE
Qty: 0 | Refills: 0 | Status: COMPLETED | OUTPATIENT
Start: 2019-01-12 | End: 2019-01-12

## 2019-01-12 RX ORDER — IBUPROFEN 200 MG
600 TABLET ORAL ONCE
Qty: 0 | Refills: 0 | Status: DISCONTINUED | OUTPATIENT
Start: 2019-01-12 | End: 2019-01-12

## 2019-01-12 RX ORDER — ACETAMINOPHEN 500 MG
325 TABLET ORAL ONCE
Qty: 0 | Refills: 0 | Status: COMPLETED | OUTPATIENT
Start: 2019-01-12 | End: 2019-01-12

## 2019-01-12 RX ORDER — ONDANSETRON 8 MG/1
1 TABLET, FILM COATED ORAL
Qty: 12 | Refills: 0
Start: 2019-01-12 | End: 2019-01-14

## 2019-01-12 RX ORDER — KETOROLAC TROMETHAMINE 30 MG/ML
15 SYRINGE (ML) INJECTION ONCE
Qty: 0 | Refills: 0 | Status: DISCONTINUED | OUTPATIENT
Start: 2019-01-12 | End: 2019-01-12

## 2019-01-12 RX ORDER — SODIUM CHLORIDE 9 MG/ML
1000 INJECTION, SOLUTION INTRAVENOUS ONCE
Qty: 0 | Refills: 0 | Status: COMPLETED | OUTPATIENT
Start: 2019-01-12 | End: 2019-01-12

## 2019-01-12 RX ORDER — ACETAMINOPHEN 500 MG
650 TABLET ORAL ONCE
Qty: 0 | Refills: 0 | Status: COMPLETED | OUTPATIENT
Start: 2019-01-12 | End: 2019-01-12

## 2019-01-12 RX ADMIN — Medication 325 MILLIGRAM(S): at 14:22

## 2019-01-12 RX ADMIN — Medication 1 TABLET(S): at 16:00

## 2019-01-12 RX ADMIN — Medication 15 MILLIGRAM(S): at 12:42

## 2019-01-12 RX ADMIN — Medication 325 MILLIGRAM(S): at 12:48

## 2019-01-12 RX ADMIN — SODIUM CHLORIDE 1000 MILLILITER(S): 9 INJECTION, SOLUTION INTRAVENOUS at 12:11

## 2019-01-12 RX ADMIN — Medication 15 MILLIGRAM(S): at 14:23

## 2019-01-12 RX ADMIN — Medication 650 MILLIGRAM(S): at 12:48

## 2019-01-12 RX ADMIN — Medication 650 MILLIGRAM(S): at 12:11

## 2019-01-12 RX ADMIN — ONDANSETRON 4 MILLIGRAM(S): 8 TABLET, FILM COATED ORAL at 15:56

## 2019-01-12 NOTE — ED PROVIDER NOTE - ATTENDING CONTRIBUTION TO CARE
Patient is a 44 yo F with history of fibromyalgia here for evaluation of upper lip and facial swelling, noted this morning. Patient states she had tooth sensitivity on Thursday, saw a dentist who evaluated her and did not find a cause for her sensitivity. She states she has a history of root canal in her front incisor and has a small hole in the back of the tooth. She states the dentist did an XRAY and did not find any abnormalities. Since then, patient has had progressive pain in her gums and had a subjective fever last night. She woke up this morning to find her upper lip swollen. + nausea. No vomiting, diarrhea. She states her fibromyalgia pain has been well controlled. She notes she had URI symptoms this week and has been drinking a lot of frances tea. She tried tylenol last night for pain which helped. Denies prior episodes. Patient states she is not taking any new medications or herbal medications. She denies a family history of hereditary angioedema (symptoms explained). Denies prior episodes.     VS noted  Gen. no acute distress, Non toxic   HEENT: EOMI, mmm, tooth number 8 with small hole in back - no dental caries noted. minimal ttp to tooth number 8. airway intact - pharynx w/o erythema or exudate, no cervical or submandibular lymphadenopathy, ttp in right nasolabial fold  Lungs: CTAB/L no C/ W /R   CVS: RRR   Abd; Soft non tender, non distended   Ext: no edema  Skin: no rash  Neuro AAOx3 non focal clear speech  a/p: upper lip facial swelling - ? dental origin. r/o dental abscess. angioedema on differential but seems less likely given no family or personal history or new medications. Will get CT Max/ Face and consult dental. Will give pain meds and check labs.   - Elsie MEYERS

## 2019-01-12 NOTE — ED PROVIDER NOTE - NSFOLLOWUPINSTRUCTIONS_ED_ALL_ED_FT
If you have any severe increase in pain, fever, uncontrollable nausea vomiting, or inability to tolerate eating and drinking you need to come right back to the emergency room.     The dentist will see you in 2 days to remove the drain. Call them on Monday morning to get the time. You need to come immediately back to the emergency room if you have any swelling of your tongue, difficulty breathing or swallowing.     You will need to take the antibiotics that we sent to [facility] for the next 10 days.

## 2019-01-12 NOTE — CONSULT NOTE ADULT - SUBJECTIVE AND OBJECTIVE BOX
Patient is a 43y old  Female who presents with a chief complaint of facial swelling.    HPI: Pt reports she went to the dentist on Tuesday with pain around front maxillary central incisors (#8 and 9). Reports dentist took an xray and found no periapical pathology associated with the tooth and sent her home. Pt reports pain had increased and swelling worsened since Tuesday. Has been taking tylenol for the pain and reports it has not been helping.      PAST MEDICAL & SURGICAL HISTORY:  H pylori ulcer  No significant past surgical history    (   ) heart valve replacement  (   ) joint replacement  (   ) pregnancy    MEDICATIONS  (STANDING):    MEDICATIONS  (PRN):      Allergies    No Known Allergies    Intolerances        FAMILY HISTORY:  No pertinent family history in first degree relatives      *SOCIAL HISTORY: (guardian or who pt came with), (smoking hx)    *Last Dental Visit:    Vital Signs Last 24 Hrs  T(C): 37.7 (12 Jan 2019 10:57), Max: 37.7 (12 Jan 2019 10:57)  T(F): 99.9 (12 Jan 2019 10:57), Max: 99.9 (12 Jan 2019 10:57)  HR: 110 (12 Jan 2019 10:57) (110 - 110)  BP: 130/72 (12 Jan 2019 10:57) (130/72 - 130/72)  BP(mean): --  RR: 20 (12 Jan 2019 10:57) (20 - 20)  SpO2: 99% (12 Jan 2019 10:57) (99% - 99%)    EOE:  TMJ (   ) clicks                    (    ) pops                    (    ) crepitus             Mandible <<FROM>>             Facial bones and MOM <<grossly intact>>             (   ) trismus             (   ) LAD             (   ) swelling             (   ) asymmetry             (   ) palpation             (   ) SOB             (   ) dysphagia             (   ) LOC    IOE:  <<permanent/primary/mixed>> dentition: <<grossly intact>> OR <<multiple carious teeth>> OR <<multiple missing teeth>>           hard/soft palate:  (   ) palatal torus           tongue/FOM <<WNL>>           labial/buccal mucosa <<WNL>>           (   ) percussion           (   ) palpation           (   ) swelling     Dentition present: <<   >>  Mobility: <<  >>  Caries: <<   >>     Radiographs:    LABS:                        14.1   16.5  )-----------( 270      ( 12 Jan 2019 12:29 )             41.0     01-12    136  |  100  |  6<L>  ----------------------------<  122<H>  3.5   |  22  |  0.69    Ca    9.3      12 Jan 2019 12:29    TPro  8.3  /  Alb  4.7  /  TBili  0.5  /  DBili  x   /  AST  16  /  ALT  14  /  AlkPhos  50  01-12    WBC Count: 16.5 K/uL <H> [3.8 - 10.5] (01-12 @ 12:29)    Platelet Count - Automated: 270 K/uL [150 - 400] (01-12 @ 12:29)              RADIOLOGY & ADDITIONAL STUDIES:    ASSESSMENT:    PROCEDURE:  Verbal and written consent given.     RECOMMENDATIONS:   1) <<   >>  2) Dental F/U with outpatient dentist for comprehensive dental care.   3) If any difficulty swallowing/breathing, fever occur, page dental.     Resident Name, pager #  Oral surgeon consulted: Patient is a 43y old  Female who presents with a chief complaint of facial swelling.    HPI: Pt reports she went to the dentist on Tuesday with pain around front maxillary central incisors (#8 and 9). Reports dentist took an xray and found no periapical pathology associated with the tooth and sent her home. Pt reports pain had increased and swelling worsened since Tuesday. Has been taking tylenol for the pain and reports it has been helping minimally.      PAST MEDICAL & SURGICAL HISTORY:  H pylori ulcer  No significant past surgical history    Allergies: No Known Allergies    FAMILY HISTORY:  No pertinent family history in first degree relatives    *SOCIAL HISTORY: (guardian or who pt came with), (smoking hx)    *Last Dental Visit: Tuesday 1/8/2019    Vital Signs Last 24 Hrs  T(C): 37.7 (12 Jan 2019 10:57), Max: 37.7 (12 Jan 2019 10:57)  T(F): 99.9 (12 Jan 2019 10:57), Max: 99.9 (12 Jan 2019 10:57)  HR: 110 (12 Jan 2019 10:57) (110 - 110)  BP: 130/72 (12 Jan 2019 10:57) (130/72 - 130/72)  BP(mean): --  RR: 20 (12 Jan 2019 10:57) (20 - 20)  SpO2: 99% (12 Jan 2019 10:57) (99% - 99%)    EOE:  TMJ ( -  ) clicks                    ( -   ) pops                    ( -   ) crepitus             Mandible FROM             Facial bones and MOM grossly intact             ( -  ) trismus             ( + ) LAD             ( + ) swelling             ( + ) asymmetry             ( + ) palpation, area around upper lip is warm to touch and tender to palpation.    IOE:  permanent grossly in tact with multiple missing teeth, mulitple restorations and root canal treated teeth. #8 (right maxillary central incisor) was previously treated with a root canal and veneer over 10 years ago. (+) pain on percussion and palpation of #8. (-) pain on percussion and palpation of #7, 9 and 10.       Radiographs: 1 panoramic radiograph and 1 periapical radiograph taken and reviewed. Root canal around tooth #8 is 2 mm short. Possible periapical radiolucency with possible pathology at site #8 (maxillary central incisor on right side).    LABS:                        14.1   16.5  )-----------( 270      ( 12 Jan 2019 12:29 )             41.0     01-12    136  |  100  |  6<L>  ----------------------------<  122<H>  3.5   |  22  |  0.69    Ca    9.3      12 Jan 2019 12:29    TPro  8.3  /  Alb  4.7  /  TBili  0.5  /  DBili  x   /  AST  16  /  ALT  14  /  AlkPhos  50  01-12    WBC Count: 16.5 K/uL <H> [3.8 - 10.5] (01-12 @ 12:29)    Platelet Count - Automated: 270 K/uL [150 - 400] (01-12 @ 12:29)    ASSESSMENT: 43 year old female with no significant PMH reports to ED with facial swelling around maxillary central incisors. Radiographic and clinical findings show recurrent infection associated with tooth #8 (right max central incisor). Incision and drainage of site #8 indicated at this time.     PROCEDURE:    Verbal and written consent given.   Administered 2 carpules of 2% lidocaine with 1:100k epi and 1 carpule of 3% carbocaine as local infiltration apical to #8 (right max central incisor).   Incision made in vestibular fold apical to #8 with blade. Opened up infectious space with periosteal elevator and hand instruments.  Culture sensitivity taken.  Copiously irrigated site with sterile saline solution.   Placed penrose drain in incision site and approximated to adjacent tissue with 1 4.0 silk suture.  Hemsotasis achieved.  EBL 5 cc.  POIG.   Gave pt clinic information and informed pt that she should report to clinic to have drain removed in 2 days (monday 1/14/2019), Pt understands.    RECOMMENDATIONS:   1) Antibiotic therapy as per medical team  2) Return to [facility] at entrance 5 for drain removal in 2 days  3) Dental F/U with outpatient dentist for comprehensive dental care.   4) If any difficulty swallowing/breathing, fever occur, return to ER.     Mary Goodrich DDS #[phone]  Oral surgeon consulted: Dr. Mckeon Patient is a 43y old  Female who presents with a chief complaint of facial swelling.    HPI: Pt reports she went to the dentist on Tuesday with pain around maxillary central incisors (#8 and 9). Reports dentist took an xray and found no periapical pathology associated with either of her front teeth and sent her home. Pt reports pain and swelling have worsened since Tuesday. Has been taking tylenol for the pain and reports it has been helping minimally.      PAST MEDICAL & SURGICAL HISTORY:  H pylori ulcer  No significant past surgical history    Allergies: No Known Allergies    FAMILY HISTORY:  No pertinent family history in first degree relatives    *SOCIAL HISTORY: (guardian or who pt came with), (smoking hx)    *Last Dental Visit: Tuesday 1/8/2019    Vital Signs Last 24 Hrs  T(C): 37.7 (12 Jan 2019 10:57), Max: 37.7 (12 Jan 2019 10:57)  T(F): 99.9 (12 Jan 2019 10:57), Max: 99.9 (12 Jan 2019 10:57)  HR: 110 (12 Jan 2019 10:57) (110 - 110)  BP: 130/72 (12 Jan 2019 10:57) (130/72 - 130/72)  BP(mean): --  RR: 20 (12 Jan 2019 10:57) (20 - 20)  SpO2: 99% (12 Jan 2019 10:57) (99% - 99%)    EOE:  TMJ ( -  ) clicks                    ( -   ) pops                    ( -   ) crepitus             Mandible FROM             Facial bones and MOM grossly intact             ( -  ) trismus             ( + ) LAD             ( + ) swelling             ( + ) asymmetry             ( + ) palpation, area around upper lip is warm to touch and tender to palpation.    IOE:  permanent dentition grossly in tact with multiple missing teeth, multiple restorations and root canal treated teeth. #8 (right maxillary central incisor) was previously treated with a root canal and veneer over 10 years ago. (+) pain on percussion and palpation of #8. (-) pain on percussion and palpation of #7, 9 and 10.     Radiographs: 1 panoramic radiograph and 1 periapical radiograph taken and reviewed. Root canal around tooth #8 is 2 mm short. Possible periapical radiolucency with possible pathology at site #8 (maxillary central incisor on right side).    LABS:                        14.1   16.5  )-----------( 270      ( 12 Jan 2019 12:29 )             41.0     01-12    136  |  100  |  6<L>  ----------------------------<  122<H>  3.5   |  22  |  0.69    Ca    9.3      12 Jan 2019 12:29    TPro  8.3  /  Alb  4.7  /  TBili  0.5  /  DBili  x   /  AST  16  /  ALT  14  /  AlkPhos  50  01-12    WBC Count: 16.5 K/uL <H> [3.8 - 10.5] (01-12 @ 12:29)    Platelet Count - Automated: 270 K/uL [150 - 400] (01-12 @ 12:29)    ASSESSMENT: 43 year old female with no significant PMH reports to ED with facial swelling around maxillary central incisors. Radiographic and clinical findings show recurrent infection associated with tooth #8 (right max central incisor). Incision and drainage of site #8 indicated at this time.     PROCEDURE:    Verbal and written consent given.   Administered 2 carpules of 2% lidocaine with 1:100k epi and 1 carpule of 3% carbocaine as local infiltration apical to #8 (right max central incisor).   Incision made in vestibular fold apical to #8 with blade. Opened up infectious space with periosteal elevator and hand instruments.  Culture sensitivity taken.  Copiously irrigated site with sterile saline solution.   Placed penrose drain in incision site and approximated to adjacent tissue with 1 4.0 silk suture.  Hemsotasis achieved.  EBL 5 cc.  POIG.   Gave pt clinic information and informed pt that she should report to clinic to have drain removed in 2 days (monday 1/14/2019), Pt understands.    RECOMMENDATIONS:   1) Antibiotic therapy as per medical team  2) Return to [facility] clinic at entrance 5 for drain removal in 2 days  3) Dental F/U with outpatient dentist for comprehensive dental care.   4) If any difficulty swallowing/breathing, fever occur, return to ER.     Mary Goodrich DDS #[phone]  Oral surgeon consulted: Dr. Mckeon

## 2019-01-12 NOTE — ED PROVIDER NOTE - PHYSICAL EXAMINATION
Face: There is mild swelling of the upper lip and bilateral soft tissue overlying the left zygoma across to the right without any involvement of the lower lip or tongue.  Mouth: There is TTP of the teeth on the upper right jaw, no obvious periapical abscess, gums appear normal without any signs of infection. No tongue swelling.

## 2019-01-12 NOTE — ED PROVIDER NOTE - OBJECTIVE STATEMENT
Pt is a 43 Y F with no pmhx presents with pain and swelling of her upper lip and face. Pt states that last tue she went to her dentist for pain in her gum line on the upper right. She said that xrays were done and she was told nothing was wrong and sent home. Since then the pain has persisted however on Thu she developed swelling of her right upper lip and cheek, the swelling got worse and occurred on the right as well and she decided to come in. She also notes that she felt warm last night but didn't check her temperature. She notes nausea without vomiting as well. She denies SOB, painful swallowing, cough, nasal congestion, vision changes, hearing changes.

## 2019-01-12 NOTE — ED ADULT NURSE NOTE - OBJECTIVE STATEMENT
43 year old female no PMHX presents to the ED complaining of facial swelling, As Per patient she had some upper jaw tooth sensitivity and had an xray by her dentist who told her there was nothing there, Pt states she woke up this morning with swelling of her cheeks and upper lip, extending to the underside of right eye. Pt is A&O x 4, VSS, afebrile, ambulating independently. Pt denies fever, chills, NVD, SOB, or chest pain. Pt shows no SS of respiratory distress, Pt is speaking clearly, no difficulty swallowing.

## 2019-01-12 NOTE — ED PROVIDER NOTE - PROGRESS NOTE DETAILS
Resident: Gustavo Timmons - spoke with dental and they drained an abscess and placed a loop. They stated that the pt could be discharged on PO antibiotics and follow up in 2 days to have the drain removed. Resident: Gustavo Duarte DC was in for pt pending PO dose of abx. She had episode of emesis prior to abx, will give zofran and PO dose, if she cannot tolerate will call CDU. Resident: Gustavo Timmons – Pt was re-evaluated at bedside, VSS no longer febrile or tachycardic, feeling better overall. Discussed CDU if pt feels uncomfortable about going home tonight. Pt states that she would like to go home and get abx from her pharmacy in the morning. Results were discussed with patient as well as return precautions and follow up plan with PCP and/or specialist. Time was taken to answer any questions that the patient had before providing them with discharge paperwork.

## 2019-01-12 NOTE — ED ADULT NURSE NOTE - NSIMPLEMENTINTERV_GEN_ALL_ED
Implemented All Universal Safety Interventions:  [place] to call system. Call bell, personal items and telephone within reach. Instruct patient to call for assistance. Room bathroom lighting operational. Non-slip footwear when patient is off stretcher. Physically safe environment: no spills, clutter or unnecessary equipment. Stretcher in lowest position, wheels locked, appropriate side rails in place.

## 2019-01-12 NOTE — ED PROVIDER NOTE - MEDICAL DECISION MAKING DETAILS
43 Y F with swelling of the BL midface with possible recent dental infection still with some dental pain on exam. DDx: Natali-apical abscess, severe sinusitis, hereditary angioedema. Will get basic labs, pain control, CT max face and dental consult. Dispo pending workup.

## 2019-01-12 NOTE — ED PROVIDER NOTE - NSFOLLOWUPCLINICS_GEN_ALL_ED_FT
[facility]  Dental  [address] [place], [place] [address]  Phone: [phone]  Fax:   Follow Up Time: 1-3 Days

## 2019-02-20 ENCOUNTER — OUTPATIENT (OUTPATIENT)
Dept: OUTPATIENT SERVICES | Facility: HOSPITAL | Age: 44
LOS: 1 days | End: 2019-02-20
Payer: SELF-PAY

## 2019-02-20 ENCOUNTER — APPOINTMENT (OUTPATIENT)
Dept: INTERNAL MEDICINE | Facility: CLINIC | Age: 44
End: 2019-02-20

## 2019-02-20 DIAGNOSIS — I10 ESSENTIAL (PRIMARY) HYPERTENSION: ICD-10-CM

## 2019-02-20 PROCEDURE — G0463: CPT

## 2019-02-20 NOTE — ASSESSMENT
[FreeTextEntry1] : #Breast Mass\par - Pt has History of breast cysts with BIRADS 2 lesion in left breast on mammogram/US in April 2018. \par - b/l breast ultrasound for further characterization of tender nodules palpated on exam\par \par #Post Nasal Drip\par - Pt reporting sensation of mucus in her throat causing cough and occasional clear mucus production. Associated water eyes\par - Pt to try PRN OTC antihistamine (cetirizine). \par \par #Dental Abscess with associated headache\par - Pt to followup with dental given continued facial pain\par - Pt to take meloxicam daily for one week given headache Sx \par

## 2019-02-20 NOTE — REVIEW OF SYSTEMS
[Postnasal Drip] : postnasal drip [Cough] : cough [Headache] : headache [Negative] : Musculoskeletal [Pain] : no pain [Redness] : no redness [Dryness] : no dryness  [Vision Problems] : no vision problems [Earache] : no earache [Hearing Loss] : no hearing loss [Nasal Discharge] : no nasal discharge [Shortness Of Breath] : no shortness of breath [Wheezing] : no wheezing [Dyspnea on Exertion] : no dyspnea on exertion [Dizziness] : no dizziness [Fainting] : no fainting [Confusion] : no confusion [FreeTextEntry3] : +OTC Zyrtec for PND and eye drips\par OTC Zyrtec for PND and eye drips\par +epiphora  [FreeTextEntry4] : +sensation of mucus in throat, +dental/facial pain

## 2019-02-20 NOTE — PHYSICAL EXAM
[No Acute Distress] : no acute distress [Well Nourished] : well nourished [Well Developed] : well developed [Well-Appearing] : well-appearing [Normal Sclera/Conjunctiva] : normal sclera/conjunctiva [PERRL] : pupils equal round and reactive to light [EOMI] : extraocular movements intact [Normal Outer Ear/Nose] : the outer ears and nose were normal in appearance [Normal Oropharynx] : the oropharynx was normal [Supple] : supple [No Lymphadenopathy] : no lymphadenopathy [No Respiratory Distress] : no respiratory distress  [Clear to Auscultation] : lungs were clear to auscultation bilaterally [Normal Rate] : normal rate  [Regular Rhythm] : with a regular rhythm [Normal Appearance] : normal in appearance [No Nipple Discharge] : no nipple discharge [No Axillary Lymphadenopathy] : no axillary lymphadenopathy [Normal Gait] : normal gait [Coordination Grossly Intact] : coordination grossly intact [Speech Grossly Normal] : speech grossly normal [Normal Affect] : the affect was normal [Normal Mood] : the mood was normal [de-identified] : No erythema or swelling near labial frenulum  [de-identified] : +tender nodule at 2 O'clock in left breast, +tender nodules at 9 O'clock in right breast

## 2019-02-20 NOTE — HISTORY OF PRESENT ILLNESS
[FreeTextEntry1] : Painful Breast Masses [de-identified] : Ms. Arce is a 42y/o F with PMH of R ovarian cyst, breast cyst, fibromyalgia, and acne  presents for follow up. Pt was last seen in clinic in September 2018 for followup. \par \par Last month patient was seen in [facility] ED for dental abscess that was drained by dental at bedside with loop placement. She was discharged on Augmentin with instructions to follow up with dental to have loop removed and drainage site evaluated. Called patient to follow up, no answer. Provided patient with number for dental clinic in voicemail and instructed to make follow up ASAP. \par \par Pt reports she followed up with dental for root canal. Five days after root canal she developed a "pimple" in her moth at the site of the abscess. Pt then saw another dentist who recommended observation Vs repeat drainage. Last week pt went back to the first dentist who performed X-rays and saw no complications from the root canal. \par - pt reports associated headache that worsens her insomnia and fibromyalgia . \par - no fever/chills\par - reports post nasal drip causing sensation of mucus in throat since November\par \par Breast cysts\par - pt last had mammogram and ultrasound in April of 2018: BIRADS 2, cyst at 2 O'clock position in left breast\par - pt reports cysts have gotten bigger in recent weeks and become increasingly painful\par - 2-3 wks ago Right breast cysts enlarged: previously had breast pain with menstrual cycles, but now constant\par - no discharge, no skin changes

## 2019-02-27 DIAGNOSIS — N63.0 UNSPECIFIED LUMP IN UNSPECIFIED BREAST: ICD-10-CM

## 2019-03-05 ENCOUNTER — FORM ENCOUNTER (OUTPATIENT)
Age: 44
End: 2019-03-05

## 2019-03-06 ENCOUNTER — OUTPATIENT (OUTPATIENT)
Dept: OUTPATIENT SERVICES | Facility: HOSPITAL | Age: 44
LOS: 1 days | End: 2019-03-06
Payer: SELF-PAY

## 2019-03-06 ENCOUNTER — APPOINTMENT (OUTPATIENT)
Dept: ULTRASOUND IMAGING | Facility: IMAGING CENTER | Age: 44
End: 2019-03-06

## 2019-03-06 ENCOUNTER — APPOINTMENT (OUTPATIENT)
Dept: MAMMOGRAPHY | Facility: IMAGING CENTER | Age: 44
End: 2019-03-06
Payer: COMMERCIAL

## 2019-03-06 DIAGNOSIS — N63.0 UNSPECIFIED LUMP IN UNSPECIFIED BREAST: ICD-10-CM

## 2019-03-06 PROCEDURE — 76641 ULTRASOUND BREAST COMPLETE: CPT | Mod: 26,50

## 2019-03-06 PROCEDURE — 76641 ULTRASOUND BREAST COMPLETE: CPT

## 2019-04-03 ENCOUNTER — APPOINTMENT (OUTPATIENT)
Dept: INTERNAL MEDICINE | Facility: CLINIC | Age: 44
End: 2019-04-03

## 2019-04-03 VITALS
BODY MASS INDEX: 21.97 KG/M2 | HEART RATE: 94 BPM | OXYGEN SATURATION: 98 % | DIASTOLIC BLOOD PRESSURE: 70 MMHG | WEIGHT: 109 LBS | HEIGHT: 59 IN | SYSTOLIC BLOOD PRESSURE: 102 MMHG

## 2019-04-03 RX ORDER — METRONIDAZOLE 7.5 MG/G
0.75 GEL VAGINAL
Qty: 1 | Refills: 0 | Status: DISCONTINUED | COMMUNITY
Start: 2018-10-05 | End: 2019-04-03

## 2019-04-03 RX ORDER — MELOXICAM 7.5 MG/1
7.5 TABLET ORAL TWICE DAILY
Qty: 60 | Refills: 1 | Status: DISCONTINUED | COMMUNITY
Start: 2018-06-29 | End: 2019-04-03

## 2019-04-04 NOTE — HISTORY OF PRESENT ILLNESS
[de-identified] : 42F PMH R ovarian cyst, breast cyst, fibromyalgia presents for CPE.  \par \par Pt had newly diagnosed right front tooth abscess.\par \par # Tooth abscess\par Pt states she has been having pain on the right side of her face since Jan 2019.  Pt states she went for a root canal after which she developed an abscess that required draining.  Pt has been following with [facility] for surgical drainage.  Pt states she has been having right sided headache, ear pain that is sharp since the root canal.  Denies fevers associated.  Denies chills.  Denies tinnitus.   \par Pt is still taking amoxicillin (has 2 days left) and ibuprofen for pain\par \par # Fibromyalgia\par Pt states she has been having less pain this last winter.  Knee pain and shoulder pain is resolved.  Pt had been exercising prior to winter, however has not been as active since winter started.

## 2019-04-04 NOTE — PHYSICAL EXAM
[No Acute Distress] : no acute distress [Well Nourished] : well nourished [Well Developed] : well developed [Well-Appearing] : well-appearing [Normal Sclera/Conjunctiva] : normal sclera/conjunctiva [PERRL] : pupils equal round and reactive to light [EOMI] : extraocular movements intact [Fundoscopic Exam Performed] : fundoscopic ~T exam ~C was performed [Normal Outer Ear/Nose] : the outer ears and nose were normal in appearance [Normal Oropharynx] : the oropharynx was normal [Normal TMs] : both tympanic membranes were normal [Normal Nasal Mucosa] : the nasal mucosa was normal [No JVD] : no jugular venous distention [Supple] : supple [No Lymphadenopathy] : no lymphadenopathy [Thyroid Normal, No Nodules] : the thyroid was normal and there were no nodules present [No Respiratory Distress] : no respiratory distress  [Clear to Auscultation] : lungs were clear to auscultation bilaterally [No Accessory Muscle Use] : no accessory muscle use [Normal Rate] : normal rate  [Regular Rhythm] : with a regular rhythm [Normal S1, S2] : normal S1 and S2 [No Murmur] : no murmur heard [No Carotid Bruits] : no carotid bruits [No Abdominal Bruit] : a ~M bruit was not heard ~T in the abdomen [No Varicosities] : no varicosities [Pedal Pulses Present] : the pedal pulses are present [No Edema] : there was no peripheral edema [No Extremity Clubbing/Cyanosis] : no extremity clubbing/cyanosis [No Palpable Aorta] : no palpable aorta [Soft] : abdomen soft [Non Tender] : non-tender [Non-distended] : non-distended [No Masses] : no abdominal mass palpated [No HSM] : no HSM [Normal Bowel Sounds] : normal bowel sounds [Normal Axillary Nodes] : no axillary lymphadenopathy [Normal Posterior Cervical Nodes] : no posterior cervical lymphadenopathy [Normal Anterior Cervical Nodes] : no anterior cervical lymphadenopathy [Normal Inguinal Nodes] : no inguinal lymphadenopathy [No CVA Tenderness] : no CVA  tenderness [No Spinal Tenderness] : no spinal tenderness [No Joint Swelling] : no joint swelling [Grossly Normal Strength/Tone] : grossly normal strength/tone [No Rash] : no rash [Normal Gait] : normal gait [Coordination Grossly Intact] : coordination grossly intact [Normal Affect] : the affect was normal [Alert and Oriented x3] : oriented to person, place, and time [Normal Insight/Judgement] : insight and judgment were intact [de-identified] : maxillary and erythoid sinus tender on right, no mucous noted in sinuses [de-identified] : tender to palpation on right base of neck [de-identified] : breasts with multiple round masses palpable on right > left; right with one mass in outer upper quadrant ~2cm. [de-identified] : left arm with one 2mm dark spot, raised.

## 2019-04-04 NOTE — ASSESSMENT
[FreeTextEntry1] : 43F PMH breast cysts and fibromyalgia presenting for CPE.\par \par # Tooth abscess\par Pt having pain in distruction of trigmeninal nerve, likely related to right tooth abscess\par - encouraged f/u with OMFS\par - pt will try meloxicam for pain\par - rtc if pain does not subside\par \par # Breast cyst\par Pt has enlarging breast cysts on right today.\par - Pt had US in Jan showing enlarging cyst as well\par - encouraged pt to follow with breast clinic, referral given\par \par # Mole\par Pt states she has been having some moles that she has noticed are new.  Examined the one she was concerned about on her left upper arm, which is not abnormal (no discoloration, even borders, raised, no change per patient).\par - Referral given for derm, however pt states she does not want to go to derm at this time.\par - Will encourage visit to ensure no other abnormal lesions\par - Encourage sunscreen use.\par \par # Fibromyalgia\par Pt now with better control on fibromyalgia, no issues today.\par - encouraged to c/w exercise.\par \par # HCM\par - Pt had pap in Oct 2018, neg, HPV neg; due in 2021\par - Mammo  referral given today, done last aug 2018 Birads 2\par - RTC in 1 year or prn\par - declined blood work today, given that lipids, cmp and cbc were wnl, will defer at this time.\par \par Discussed with Dr. Bonilla.

## 2019-04-04 NOTE — REVIEW OF SYSTEMS
[Headache] : headache [Fever] : no fever [Chills] : no chills [Fatigue] : no fatigue [Hot Flashes] : no hot flashes [Discharge] : no discharge [Pain] : no pain [Redness] : no redness [Dryness] : no dryness  [Vision Problems] : no vision problems [Itching] : no itching [Earache] : no earache [Hearing Loss] : no hearing loss [Nosebleed] : no nosebleeds [Chest Pain] : no chest pain [Palpitations] : no palpitations [Orthopnea] : no orthopnea [Paroysmal Nocturnal Dyspnea] : no paroysmal nocturnal dyspnea [Abdominal Pain] : no abdominal pain [Nausea] : no nausea [Constipation] : no constipation [Diarrhea] : diarrhea [Dysuria] : no dysuria [Incontinence] : no incontinence [Nocturia] : no nocturia [Hematuria] : no hematuria [Frequency] : no frequency [Vaginal Discharge] : no vaginal discharge [Joint Pain] : no joint pain [Joint Stiffness] : no joint stiffness [Muscle Pain] : no muscle pain [Back Pain] : no back pain [Hair Changes] : no hair changes [Dizziness] : no dizziness [Fainting] : no fainting [Confusion] : no confusion

## 2019-04-09 ENCOUNTER — APPOINTMENT (OUTPATIENT)
Dept: INTERNAL MEDICINE | Facility: CLINIC | Age: 44
End: 2019-04-09

## 2019-04-09 VITALS
HEART RATE: 61 BPM | WEIGHT: 108 LBS | SYSTOLIC BLOOD PRESSURE: 112 MMHG | DIASTOLIC BLOOD PRESSURE: 70 MMHG | OXYGEN SATURATION: 99 % | BODY MASS INDEX: 21.77 KG/M2 | HEIGHT: 59 IN

## 2019-04-10 NOTE — ASSESSMENT
[FreeTextEntry1] : This is a 42 y/o F with fibromyalgia who presents for headaches.\par \par 1) Headache\par - patient's headaches seem to correspond with high dose ibuprofen/meloxicam use and so medication overuse headache is possible\par - MRI in 2015 was negative --> likelihood of developing new mass in 3-4 years is low\par - reassured patient about her previous MRI findings\par - encouraged her to stop taking NSAIDs for her headaches and to use low dose tylenol if some medication relief is needed\par - encouraged her to stay hydrated and to seek out massage therapy, heat/cold packs for shoulder and neck pain to also alleviate symptoms\par \par Patient to RTC in 4 weeks to see Dr. Lan for assessment of symptoms and follow up

## 2019-04-10 NOTE — REVIEW OF SYSTEMS
[Fatigue] : fatigue [Earache] : earache [Muscle Pain] : muscle pain [Headache] : headache [Insomnia] : insomnia [Fever] : no fever [Chills] : no chills [Vision Problems] : no vision problems [Hearing Loss] : no hearing loss [Nasal Discharge] : no nasal discharge [Shortness Of Breath] : no shortness of breath [Chest Pain] : no chest pain [Postnasal Drip] : no postnasal drip [Abdominal Pain] : no abdominal pain [Dizziness] : no dizziness [Confusion] : no confusion

## 2019-04-10 NOTE — PHYSICAL EXAM
[Well Nourished] : well nourished [No Acute Distress] : no acute distress [EOMI] : extraocular movements intact [Normal Outer Ear/Nose] : the outer ears and nose were normal in appearance [PERRL] : pupils equal round and reactive to light [No Respiratory Distress] : no respiratory distress  [Supple] : supple [Normal TMs] : both tympanic membranes were normal [Clear to Auscultation] : lungs were clear to auscultation bilaterally [Regular Rhythm] : with a regular rhythm [Normal Rate] : normal rate  [No Edema] : there was no peripheral edema [Normal S1, S2] : normal S1 and S2 [No Focal Deficits] : no focal deficits [Alert and Oriented x3] : oriented to person, place, and time [de-identified] : mild effusion noted and ear wax in R ear [de-identified] : CN II-XII in tact bilaterally, R maxillary sinus tender to palpation

## 2019-04-16 ENCOUNTER — APPOINTMENT (OUTPATIENT)
Dept: SURGICAL ONCOLOGY | Facility: CLINIC | Age: 44
End: 2019-04-16
Payer: SELF-PAY

## 2019-04-16 VITALS
HEIGHT: 59 IN | HEART RATE: 54 BPM | WEIGHT: 108 LBS | OXYGEN SATURATION: 100 % | BODY MASS INDEX: 21.77 KG/M2 | SYSTOLIC BLOOD PRESSURE: 145 MMHG | DIASTOLIC BLOOD PRESSURE: 82 MMHG

## 2019-04-16 DIAGNOSIS — Z87.898 PERSONAL HISTORY OF OTHER SPECIFIED CONDITIONS: ICD-10-CM

## 2019-04-16 PROCEDURE — 99244 OFF/OP CNSLTJ NEW/EST MOD 40: CPT

## 2019-04-16 NOTE — CONSULT LETTER
[Dear  ___] : Dear  [unfilled], [Courtesy Letter:] : I had the pleasure of seeing your patient, [unfilled], in my office today. [Please see my note below.] : Please see my note below. [Sincerely,] : Sincerely, [FreeTextEntry2] : -  Jo Ann Lan (EMANI )\par  [FreeTextEntry3] : \par \par Huseyin Howell MD, FICS, FACS\par , Surgical Oncology\par [facility] [employer] [facility] [employer] [facility]\par [address]\[place], [place]- [address]\par \par [address]\par [place], [place]- [address]\par (mob) [phone]\par (o) [phone]\par (f) [phone] \par \par

## 2019-04-16 NOTE — HISTORY OF PRESENT ILLNESS
[de-identified] : Ms. Arce is a 43 year old female being seen for an initial consultation (referred by [facility]) due to a recent finding on her bilateral breast ultrasound dated 2019 as follows:\par \par Right Breast:\par -  9:00 axis, 4cm from the nipple, simple cyst measuring 2.9 x 1.1 x 2.1 cm - previously measuring 2.4 x 1.0 x 2.2 cm, correlating to an area of palpable concern.  \par -  Multiple additional simple cysts and cyst clusters.  \par \par Left Breast:\par -  Multiple simple cysts measuring up to 3.8 x 1.7 x 3.8 cm at the 10:00 axis, retroareolar region\par -  2:00 axis, 4 cm from the nipple, subcentimeter cysts measuring 0.7 x 0.4 x 0.7 cm at area of reported pain.\par -  2:00 axis, 3-4 cm from the nipple, unchanged hypoechoic mass measuring 0.6 x 0.3 x 0.4 cm.  \par -  Multiple additional simple cysts.\par \par IMPRESSION:  \par -  No sonographic evidence of malignancy\par -  Right breast palpable area of concern correlates to an enlarging cyst\par -  Left breast pain without suspicious sonographic findings.  In the absence of objective imaging findings, management of left breast pain should be determined clinically.  \par BIRADS-2\par \par Ms. Arce indicates the right breast mass is palpable is painful and symptoms will be exacerbated prior to her menstrual cycle.  She denies nipple discharge or skin changes.  Denies any history of previous breast biopsies or personal history of cancer.  \par \par PMH:\par -  fibromyalgia\par -  \par \par PSH:\par -  dental surgery 2019 complicated by infection and treated with abx.\par \par Family History:\par -  Paternal Aunt - Bone Cancer - age 72\par -  Throat Cancer - Maternal Uncle - age 60s\par -  Paternal Grandmother - endometrial cancer -  age 64\par -  Paternal 1st cousin - brain cancer - age 41\par \par \par

## 2019-04-16 NOTE — PHYSICAL EXAM
[Normal] : supple, no neck mass and thyroid not enlarged [Normal Neck Lymph Nodes] : normal neck lymph nodes  [Normal Supraclavicular Lymph Nodes] : normal supraclavicular lymph nodes [Normal Groin Lymph Nodes] : normal groin lymph nodes [Normal Axillary Lymph Nodes] : normal axillary lymph nodes [Normal] : grossly intact [de-identified] : tender, soft, fluctuant, palpable mass in both breast in the upper outer quadrant no erythema, no nipple discharge, no palpable lymphadenopathy in both axilla and/or neck.

## 2019-04-16 NOTE — ASSESSMENT
[FreeTextEntry1] : 43 Y F with symptomatic bilateral breasts right > left\par \par Plan:\par US guided breast cyst aspiration with cyst fluid analysis\par RTO after aspiration\par I have discussed the diagnosis, therapeutic plan and options with the patient at length. Patient expressed verbal understanding to proceed with proposed plan. All questions answered.\par

## 2019-05-13 ENCOUNTER — APPOINTMENT (OUTPATIENT)
Dept: INTERNAL MEDICINE | Facility: CLINIC | Age: 44
End: 2019-05-13

## 2019-05-13 ENCOUNTER — OUTPATIENT (OUTPATIENT)
Dept: OUTPATIENT SERVICES | Facility: HOSPITAL | Age: 44
LOS: 1 days | End: 2019-05-13
Payer: SELF-PAY

## 2019-05-13 VITALS
HEIGHT: 59 IN | TEMPERATURE: 98.1 F | HEART RATE: 71 BPM | WEIGHT: 108 LBS | SYSTOLIC BLOOD PRESSURE: 118 MMHG | DIASTOLIC BLOOD PRESSURE: 70 MMHG | BODY MASS INDEX: 21.77 KG/M2 | OXYGEN SATURATION: 99 %

## 2019-05-13 DIAGNOSIS — R51 HEADACHE: ICD-10-CM

## 2019-05-13 DIAGNOSIS — I10 ESSENTIAL (PRIMARY) HYPERTENSION: ICD-10-CM

## 2019-05-13 PROCEDURE — G0463: CPT

## 2019-05-14 NOTE — ASSESSMENT
[FreeTextEntry1] : This is a 44 y/o F with a PMH significant for breast cysts and fibromyalgia who presents today for viral URI symptoms. \par \par 1) Viral URI\par - patient with symptoms of viral URI\par - recommended mucinex, nasal saline spray, and vicks vapor rub for symptomatic relief\par - continue to use cough drops as needed\par - if symptoms not improved by Friday, call the office for follow up\par \par Discussed with Dr. Conner\par \par \par

## 2019-05-14 NOTE — HISTORY OF PRESENT ILLNESS
[FreeTextEntry1] : Headaches [de-identified] : This is a 42 y/o F with a PMH significant for breast cysts and fibromyalgia who presents today sore throat and cough.\par \par Since Thursday, she's had a sore throat. She started to have nasal congestion with clear mucus. She began coughing yesterday and it is productive with yellow tinged sputum. Had chills on Thursday and Friday. Tried Sudafed and Chloraseptic drops without much relief. Has mild headache but improved from before. \par \par \par She states that she first started having headaches in January when she was having her tooth pain. She was taking high doses of motrin at that time but then stopped taking motrin and her headaches also resolved. However, they started to worsen again in the last month after she had her tooth abscess drained. She was prescribed high dose ibuprofen and meloxicam for the pain that she was taking daily and discontinued last week. She states the headaches sometimes are severe enough to cause nausea and vomiting. Denies photophobia or phonophobia. They don't wake her at night. She says she also has pain in her R ear intermittently. She is worried because her cousin was diagnosed with a brain tumor at age 37 and complained of headaches. \par \par Of note, patient had been complaining of headaches in 2015 and had an MRI which was negative. \par

## 2019-05-14 NOTE — REVIEW OF SYSTEMS
[Chills] : chills [Sore Throat] : sore throat [Postnasal Drip] : postnasal drip [Cough] : cough [Headache] : headache [Fever] : no fever [Vision Problems] : no vision problems [Chest Pain] : no chest pain [Shortness Of Breath] : no shortness of breath [Orthopnea] : no orthopnea [Palpitations] : no palpitations [Abdominal Pain] : no abdominal pain [Vomiting] : no vomiting [Muscle Pain] : no muscle pain

## 2019-05-17 DIAGNOSIS — J06.9 ACUTE UPPER RESPIRATORY INFECTION, UNSPECIFIED: ICD-10-CM

## 2019-05-17 DIAGNOSIS — R51 HEADACHE: ICD-10-CM

## 2019-05-31 ENCOUNTER — APPOINTMENT (OUTPATIENT)
Dept: INTERNAL MEDICINE | Facility: CLINIC | Age: 44
End: 2019-05-31

## 2019-06-10 ENCOUNTER — FORM ENCOUNTER (OUTPATIENT)
Age: 44
End: 2019-06-10

## 2019-06-11 ENCOUNTER — OUTPATIENT (OUTPATIENT)
Dept: OUTPATIENT SERVICES | Facility: HOSPITAL | Age: 44
LOS: 1 days | End: 2019-06-11
Payer: SELF-PAY

## 2019-06-11 ENCOUNTER — RESULT REVIEW (OUTPATIENT)
Age: 44
End: 2019-06-11

## 2019-06-11 ENCOUNTER — APPOINTMENT (OUTPATIENT)
Dept: ULTRASOUND IMAGING | Facility: IMAGING CENTER | Age: 44
End: 2019-06-11
Payer: SELF-PAY

## 2019-06-11 DIAGNOSIS — N60.01 SOLITARY CYST OF RIGHT BREAST: ICD-10-CM

## 2019-06-11 PROCEDURE — 88305 TISSUE EXAM BY PATHOLOGIST: CPT | Mod: 26

## 2019-06-11 PROCEDURE — 88173 CYTOPATH EVAL FNA REPORT: CPT | Mod: 26

## 2019-06-11 PROCEDURE — 88305 TISSUE EXAM BY PATHOLOGIST: CPT

## 2019-06-11 PROCEDURE — 88173 CYTOPATH EVAL FNA REPORT: CPT

## 2019-06-11 PROCEDURE — 76942 ECHO GUIDE FOR BIOPSY: CPT

## 2019-06-11 PROCEDURE — 19000 PUNCTURE ASPIR CYST BREAST: CPT

## 2019-06-14 LAB
NON-GYNECOLOGICAL CYTOLOGY STUDY: SIGNIFICANT CHANGE UP
NON-GYNECOLOGICAL CYTOLOGY STUDY: SIGNIFICANT CHANGE UP

## 2019-06-23 ENCOUNTER — FORM ENCOUNTER (OUTPATIENT)
Age: 44
End: 2019-06-23

## 2019-06-24 ENCOUNTER — OUTPATIENT (OUTPATIENT)
Dept: OUTPATIENT SERVICES | Facility: HOSPITAL | Age: 44
LOS: 1 days | End: 2019-06-24
Payer: SELF-PAY

## 2019-06-24 ENCOUNTER — APPOINTMENT (OUTPATIENT)
Dept: INTERNAL MEDICINE | Facility: CLINIC | Age: 44
End: 2019-06-24

## 2019-06-24 ENCOUNTER — APPOINTMENT (OUTPATIENT)
Dept: ULTRASOUND IMAGING | Facility: IMAGING CENTER | Age: 44
End: 2019-06-24
Payer: SELF-PAY

## 2019-06-24 ENCOUNTER — RESULT REVIEW (OUTPATIENT)
Age: 44
End: 2019-06-24

## 2019-06-24 VITALS
BODY MASS INDEX: 20.6 KG/M2 | SYSTOLIC BLOOD PRESSURE: 110 MMHG | OXYGEN SATURATION: 99 % | WEIGHT: 102 LBS | HEART RATE: 66 BPM | DIASTOLIC BLOOD PRESSURE: 80 MMHG

## 2019-06-24 DIAGNOSIS — I10 ESSENTIAL (PRIMARY) HYPERTENSION: ICD-10-CM

## 2019-06-24 DIAGNOSIS — H00.015 HORDEOLUM EXTERNUM LEFT LOWER EYELID: ICD-10-CM

## 2019-06-24 DIAGNOSIS — N63.0 UNSPECIFIED LUMP IN UNSPECIFIED BREAST: ICD-10-CM

## 2019-06-24 DIAGNOSIS — N60.01 SOLITARY CYST OF RIGHT BREAST: ICD-10-CM

## 2019-06-24 PROCEDURE — G0463: CPT

## 2019-06-24 PROCEDURE — 77065 DX MAMMO INCL CAD UNI: CPT | Mod: 26,RT

## 2019-06-24 PROCEDURE — 19083 BX BREAST 1ST LESION US IMAG: CPT | Mod: RT

## 2019-06-24 PROCEDURE — 77065 DX MAMMO INCL CAD UNI: CPT

## 2019-06-24 PROCEDURE — 88305 TISSUE EXAM BY PATHOLOGIST: CPT | Mod: 26

## 2019-06-24 PROCEDURE — 88305 TISSUE EXAM BY PATHOLOGIST: CPT

## 2019-06-24 PROCEDURE — 19083 BX BREAST 1ST LESION US IMAG: CPT

## 2019-06-24 PROCEDURE — A4648: CPT

## 2019-06-25 NOTE — PHYSICAL EXAM
[No Acute Distress] : no acute distress [PERRL] : pupils equal round and reactive to light [Ill-Appearing] : ill-appearing [EOMI] : extraocular movements intact [No Lymphadenopathy] : no lymphadenopathy [No Accessory Muscle Use] : no accessory muscle use [Normal Rate] : normal rate  [Clear to Auscultation] : lungs were clear to auscultation bilaterally [Regular Rhythm] : with a regular rhythm [Soft] : abdomen soft [Non Tender] : non-tender [Normal Bowel Sounds] : normal bowel sounds [No Rash] : no rash [Coordination Grossly Intact] : coordination grossly intact [Normal Gait] : normal gait [No Focal Deficits] : no focal deficits [Alert and Oriented x3] : oriented to person, place, and time [de-identified] : +Stye

## 2019-06-25 NOTE — ASSESSMENT
[FreeTextEntry1] : This is a 44 y/o F with a PMH significant for breast cysts and fibromyalgia who presents today for viral URI symptoms. \par \par #breast cyst\par -found on mammo and ultrasound, s/p FNA w/ atypia however final diagnosis limited as not enough tissue sample underwent core needle biopsy today. pending pathology results. \par \par #trigeminal neuralgia \par -likely in setting of post dental procedure. Patient describes sharp headache when area above dental procedure site it pushed. seems to be in trigeminal region. above eye brow, upper lip right side under nostril and temporal region, Denies any difficulty or pain when chewing or brushing hair. NO blurry vision. Offered Gabapentin however patient defers medical management for now. Will follow up with dentist who preformed surgery. Can consider neurology referral if dental does not address issue. \par  \par #stye\par left eye lower eye lid, recommend warm compress 4 times a day\par \par RTC in 1 year or as needed

## 2019-06-25 NOTE — HISTORY OF PRESENT ILLNESS
[FreeTextEntry8] : Patient describes sharp headache when area above dental procedure site(seen by maxofacial surgeon in [place] for abscess/tooth infection) is pushed for the last 6 months. seems to be in trigeminal region. above eye brow, upper lip right side under nostril and temporal region, Denies any difficulty or pain when chewing or brushing hair. NO blurry vision, nausea/vomiting. Offered Gabapentin however patient defers medical management for now. Will follow up with dentist who preformed surgery. Can consider neurology referral if dental does not address issue. \par \par also endorses left lower eye lid lesion, w/o drainage, or changes in vision

## 2019-06-25 NOTE — REVIEW OF SYSTEMS
[Sore Throat] : sore throat [Chills] : chills [Postnasal Drip] : postnasal drip [Cough] : cough [Headache] : headache [Vision Problems] : no vision problems [Fever] : no fever [Chest Pain] : no chest pain [Palpitations] : no palpitations [Orthopnea] : no orthopnea [Abdominal Pain] : no abdominal pain [Shortness Of Breath] : no shortness of breath [Muscle Pain] : no muscle pain [Vomiting] : no vomiting

## 2019-07-08 DIAGNOSIS — N60.01 SOLITARY CYST OF RIGHT BREAST: ICD-10-CM

## 2019-07-08 DIAGNOSIS — H00.015 HORDEOLUM EXTERNUM LEFT LOWER EYELID: ICD-10-CM

## 2019-08-05 PROBLEM — N64.89 PSEUDOANGIOMATOUS STROMAL HYPERPLASIA OF BREAST: Status: ACTIVE | Noted: 2019-08-05

## 2019-08-06 ENCOUNTER — APPOINTMENT (OUTPATIENT)
Dept: SURGICAL ONCOLOGY | Facility: CLINIC | Age: 44
End: 2019-08-06
Payer: COMMERCIAL

## 2019-08-06 VITALS
SYSTOLIC BLOOD PRESSURE: 110 MMHG | HEART RATE: 62 BPM | HEIGHT: 59 IN | DIASTOLIC BLOOD PRESSURE: 70 MMHG | OXYGEN SATURATION: 98 % | WEIGHT: 102 LBS | BODY MASS INDEX: 20.56 KG/M2

## 2019-08-06 DIAGNOSIS — N64.89 OTHER SPECIFIED DISORDERS OF BREAST: ICD-10-CM

## 2019-08-06 PROCEDURE — 99214 OFFICE O/P EST MOD 30 MIN: CPT

## 2019-08-06 NOTE — PHYSICAL EXAM
[Normal] : supple, no neck mass and thyroid not enlarged [Normal Supraclavicular Lymph Nodes] : normal supraclavicular lymph nodes [Normal Groin Lymph Nodes] : normal groin lymph nodes [Normal Axillary Lymph Nodes] : normal axillary lymph nodes [Normal] : full range of motion and no deformities appreciated [de-identified] : Normal breast inspection and palpation of axillas. Dominant right breast mass 12:00 retroareolar region.  No nipple discharge bilaterally.  Left breast tenderness. [de-identified] : R

## 2019-08-06 NOTE — ASSESSMENT
[FreeTextEntry1] : 43 Y F with symptomatic bilateral breasts right > left.  B/L breast cyst aspiration June 2019 with benign findings.  Right breast US guided core biopsy 9:00 with OSORIO.\par \par Plan:\par Lupe will undergo her annual mammo/sono now.  She will call our office after completion so that I can review the results with her.\par Because of her personal history of dense breasts and family history of cancer I am also requesting that she undergo a breast MRI in 6 months (Feb 2020) after which she will return for a follow up exam.\par I have discussed the diagnosis, therapeutic plan and options with the patient at length. Patient expressed verbal understanding to proceed with proposed plan. All questions answered.\par

## 2019-08-06 NOTE — HISTORY OF PRESENT ILLNESS
[de-identified] : Ms. Arce is a 43 year old female returns for follow up breast exam.  \par \par She was seen for an initial consultation (referred by [facility]) 2019 due to a finding on her bilateral breast ultrasound dated 2019 as follows:\par \par Right Breast:\par -  9:00 axis, 4cm from the nipple, simple cyst measuring 2.9 x 1.1 x 2.1 cm - previously measuring 2.4 x 1.0 x 2.2 cm, correlating to an area of palpable concern.  \par -  Multiple additional simple cysts and cyst clusters.  \par \par Left Breast:\par -  Multiple simple cysts measuring up to 3.8 x 1.7 x 3.8 cm at the 10:00 axis, retroareolar region\par -  2:00 axis, 4 cm from the nipple, subcentimeter cysts measuring 0.7 x 0.4 x 0.7 cm at area of reported pain.\par -  2:00 axis, 3-4 cm from the nipple, unchanged hypoechoic mass measuring 0.6 x 0.3 x 0.4 cm.  \par -  Multiple additional simple cysts.\par \par IMPRESSION:  \par -  No sonographic evidence of malignancy\par -  Right breast palpable area of concern correlates to an enlarging cyst\par -  Left breast pain without suspicious sonographic findings.  In the absence of objective imaging findings, management of left breast pain should be determined clinically.  \par BIRADS-2\par \par Ms. Arce indicated the right breast mass is palpable is painful and symptoms will be exacerbated prior to her menstrual cycle.  She denied nipple discharge or skin changes.  Denies any history of previous breast biopsies or personal history of cancer.  She underwent a bilateral breast cyst aspiration on 19 with benign findings.  She then underwent a right breast US guided core biopsy of right breast 9:00 mass with a final path of PASH.  \par \par Today she feels that her right breast tenderness has decreased since undergoing her aspiration.  She is without any complaint and continues to deny nipple inversion skin changes, or breast pain. Denies constitutional symptoms.\par  \par \par PMH:\par -  fibromyalgia\par -  \par \par PSH:\par -  dental surgery 2019 complicated by infection and treated with abx.\par \par Family History:\par -  Paternal Aunt - Bone Cancer - age 72\par -  Throat Cancer - Maternal Uncle - age 60s\par -  Paternal Grandmother - endometrial cancer -  age 64\par -  Paternal 1st cousin - brain cancer - age 41\par \par \par

## 2019-08-22 ENCOUNTER — APPOINTMENT (OUTPATIENT)
Dept: DERMATOLOGY | Facility: HOSPITAL | Age: 44
End: 2019-08-22
Payer: COMMERCIAL

## 2019-08-22 ENCOUNTER — OUTPATIENT (OUTPATIENT)
Dept: OUTPATIENT SERVICES | Facility: HOSPITAL | Age: 44
LOS: 1 days | End: 2019-08-22
Payer: SELF-PAY

## 2019-08-22 VITALS
BODY MASS INDEX: 21.37 KG/M2 | DIASTOLIC BLOOD PRESSURE: 72 MMHG | WEIGHT: 106 LBS | RESPIRATION RATE: 14 BRPM | SYSTOLIC BLOOD PRESSURE: 112 MMHG | HEIGHT: 59 IN | HEART RATE: 51 BPM

## 2019-08-22 DIAGNOSIS — Z12.83 ENCOUNTER FOR SCREENING FOR MALIGNANT NEOPLASM OF SKIN: ICD-10-CM

## 2019-08-22 DIAGNOSIS — L92.8 OTHER GRANULOMATOUS DISORDERS OF THE SKIN AND SUBCUTANEOUS TISSUE: ICD-10-CM

## 2019-08-22 DIAGNOSIS — D23.9 OTHER BENIGN NEOPLASM OF SKIN, UNSPECIFIED: ICD-10-CM

## 2019-08-22 DIAGNOSIS — L98.9 DISORDER OF THE SKIN AND SUBCUTANEOUS TISSUE, UNSPECIFIED: ICD-10-CM

## 2019-08-22 PROCEDURE — G0463: CPT

## 2019-08-22 PROCEDURE — 99213 OFFICE O/P EST LOW 20 MIN: CPT

## 2019-09-10 ENCOUNTER — OUTPATIENT (OUTPATIENT)
Dept: OUTPATIENT SERVICES | Facility: HOSPITAL | Age: 44
LOS: 1 days | End: 2019-09-10
Payer: SELF-PAY

## 2019-09-10 ENCOUNTER — APPOINTMENT (OUTPATIENT)
Dept: OBGYN | Facility: CLINIC | Age: 44
End: 2019-09-10
Payer: COMMERCIAL

## 2019-09-10 VITALS — DIASTOLIC BLOOD PRESSURE: 70 MMHG | WEIGHT: 105 LBS | SYSTOLIC BLOOD PRESSURE: 110 MMHG | BODY MASS INDEX: 21.21 KG/M2

## 2019-09-10 DIAGNOSIS — N76.0 ACUTE VAGINITIS: ICD-10-CM

## 2019-09-10 PROCEDURE — G0463: CPT

## 2019-09-10 PROCEDURE — 81003 URINALYSIS AUTO W/O SCOPE: CPT

## 2019-09-10 PROCEDURE — 99214 OFFICE O/P EST MOD 30 MIN: CPT | Mod: NC

## 2019-09-10 NOTE — PHYSICAL EXAM
[Awake] : awake [Alert] : alert [Soft] : soft [Oriented x3] : oriented to person, place, and time [No Lesions] : no genitalia lesions [Labia Majora] : labia major [Normal] : clitoris [No Bleeding] : there was no active vaginal bleeding [Normal Position] : in a normal position [Uterine Adnexae] : were not tender and not enlarged [No Tenderness] : no rectal tenderness [RRR, No Murmurs] : RRR, no murmurs [CTAB] : CTAB [Acute Distress] : no acute distress [Mass] : no breast mass [Nipple Discharge] : no nipple discharge [Axillary LAD] : no axillary lymphadenopathy [Tender] : non tender [Discharge] : had no discharge [IUD String] : did not have an IUD string protruding out [Pap Obtained] : a Pap smear was not performed [Motion Tenderness] : there was no cervical motion tenderness [Tenderness] : nontender [Enlarged ___ wks] : not enlarged [Adnexa Tenderness] : were not tender [Ovarian Mass (___ Cm)] : there were no adnexal masses

## 2019-09-10 NOTE — HISTORY OF PRESENT ILLNESS
[Definite:  ___ (Date)] : the last menstrual period was [unfilled] [Normal Amount/Duration] : was of a normal amount and duration [Spotting Between  Menses] : spotting reported between menses [Regular Cycle Intervals] : periods have been regular [Frequency: Q ___ days] : menstrual periods occur approximately every [unfilled] days [Sexually Active] : is not sexually active

## 2019-09-24 DIAGNOSIS — N89.8 OTHER SPECIFIED NONINFLAMMATORY DISORDERS OF VAGINA: ICD-10-CM

## 2019-09-24 DIAGNOSIS — N60.01 SOLITARY CYST OF RIGHT BREAST: ICD-10-CM

## 2019-11-19 ENCOUNTER — FORM ENCOUNTER (OUTPATIENT)
Age: 44
End: 2019-11-19

## 2019-11-20 ENCOUNTER — APPOINTMENT (OUTPATIENT)
Dept: MAMMOGRAPHY | Facility: IMAGING CENTER | Age: 44
End: 2019-11-20
Payer: COMMERCIAL

## 2019-11-20 ENCOUNTER — APPOINTMENT (OUTPATIENT)
Dept: ULTRASOUND IMAGING | Facility: IMAGING CENTER | Age: 44
End: 2019-11-20
Payer: COMMERCIAL

## 2019-11-20 ENCOUNTER — OUTPATIENT (OUTPATIENT)
Dept: OUTPATIENT SERVICES | Facility: HOSPITAL | Age: 44
LOS: 1 days | End: 2019-11-20
Payer: SELF-PAY

## 2019-11-20 DIAGNOSIS — N89.8 OTHER SPECIFIED NONINFLAMMATORY DISORDERS OF VAGINA: ICD-10-CM

## 2019-11-20 DIAGNOSIS — N60.01 SOLITARY CYST OF RIGHT BREAST: ICD-10-CM

## 2019-11-20 PROCEDURE — 77066 DX MAMMO INCL CAD BI: CPT | Mod: 26

## 2019-11-20 PROCEDURE — 76642 ULTRASOUND BREAST LIMITED: CPT | Mod: 26,LT

## 2019-11-20 PROCEDURE — 77066 DX MAMMO INCL CAD BI: CPT

## 2019-11-20 PROCEDURE — G0279: CPT

## 2019-11-20 PROCEDURE — G0279: CPT | Mod: 26

## 2019-11-20 PROCEDURE — 76642 ULTRASOUND BREAST LIMITED: CPT

## 2020-01-29 ENCOUNTER — OUTPATIENT (OUTPATIENT)
Dept: OUTPATIENT SERVICES | Facility: HOSPITAL | Age: 45
LOS: 1 days | End: 2020-01-29
Payer: SELF-PAY

## 2020-01-29 ENCOUNTER — APPOINTMENT (OUTPATIENT)
Dept: INTERNAL MEDICINE | Facility: CLINIC | Age: 45
End: 2020-01-29

## 2020-01-29 VITALS
DIASTOLIC BLOOD PRESSURE: 70 MMHG | SYSTOLIC BLOOD PRESSURE: 130 MMHG | WEIGHT: 108 LBS | HEIGHT: 59 IN | BODY MASS INDEX: 21.77 KG/M2 | TEMPERATURE: 97.7 F

## 2020-01-29 VITALS — RESPIRATION RATE: 12 BRPM | HEART RATE: 66 BPM

## 2020-01-29 DIAGNOSIS — D23.9 OTHER BENIGN NEOPLASM OF SKIN, UNSPECIFIED: ICD-10-CM

## 2020-01-29 DIAGNOSIS — Z12.83 ENCOUNTER FOR SCREENING FOR MALIGNANT NEOPLASM OF SKIN: ICD-10-CM

## 2020-01-29 DIAGNOSIS — Z87.09 PERSONAL HISTORY OF OTHER DISEASES OF THE RESPIRATORY SYSTEM: ICD-10-CM

## 2020-01-29 DIAGNOSIS — I10 ESSENTIAL (PRIMARY) HYPERTENSION: ICD-10-CM

## 2020-01-29 PROCEDURE — G0463: CPT

## 2020-02-04 NOTE — PHYSICAL EXAM
[No Acute Distress] : no acute distress [Normal Voice/Communication] : normal voice/communication [Normal Outer Ear/Nose] : the outer ears and nose were normal in appearance [Normal Sclera/Conjunctiva] : normal sclera/conjunctiva [No Respiratory Distress] : no respiratory distress  [No JVD] : no jugular venous distention [No Carotid Bruits] : no carotid bruits [No Edema] : there was no peripheral edema [Normal Rate] : normal rate  [Soft] : abdomen soft [No HSM] : no HSM [Normal Supraclavicular Nodes] : no supraclavicular lymphadenopathy [Normal Anterior Cervical Nodes] : no anterior cervical lymphadenopathy [No Joint Swelling] : no joint swelling [No CVA Tenderness] : no CVA  tenderness [Coordination Grossly Intact] : coordination grossly intact [No Rash] : no rash [Normal Mood] : the mood was normal [Speech Grossly Normal] : speech grossly normal [de-identified] : no tender cervical lymphadenopathy anterior or posterior [de-identified] : mild erythema in the oropharynx, no exudate, no sinus pressure pain when bending forward

## 2020-02-04 NOTE — REVIEW OF SYSTEMS
[Discharge] : discharge [Sore Throat] : sore throat [Nasal Discharge] : nasal discharge [Fever] : no fever [Vision Problems] : no vision problems [Earache] : no earache [Night Sweats] : no night sweats [Orthopnea] : no orthopnea [Chest Pain] : no chest pain [Shortness Of Breath] : no shortness of breath [Abdominal Pain] : no abdominal pain [Vomiting] : no vomiting [Dysuria] : no dysuria [Hematuria] : no hematuria [Joint Pain] : no joint pain [Muscle Pain] : no muscle pain [Skin Rash] : no skin rash [Itching] : no itching [Headache] : no headache [Memory Loss] : no memory loss [Suicidal] : not suicidal [Easy Bleeding] : no easy bleeding

## 2020-02-04 NOTE — REVIEW OF SYSTEMS
[Discharge] : discharge [Sore Throat] : sore throat [Nasal Discharge] : nasal discharge [Fever] : no fever [Night Sweats] : no night sweats [Earache] : no earache [Vision Problems] : no vision problems [Chest Pain] : no chest pain [Orthopnea] : no orthopnea [Abdominal Pain] : no abdominal pain [Shortness Of Breath] : no shortness of breath [Vomiting] : no vomiting [Dysuria] : no dysuria [Hematuria] : no hematuria [Muscle Pain] : no muscle pain [Joint Pain] : no joint pain [Itching] : no itching [Headache] : no headache [Skin Rash] : no skin rash [Memory Loss] : no memory loss [Suicidal] : not suicidal [Easy Bleeding] : no easy bleeding

## 2020-02-04 NOTE — ASSESSMENT
[FreeTextEntry1] : Pt is a 45 yo F w/ fibromyalgia presenting for sore throat since last sunday. Denies recent fevers, exudates, lymphadenopathy.\par \par #Sore throat\par - Unable to perform rapid strep/RVP test today\par - Centor criteria 1, most likely viral URI given symptoms\par - will treat symptomatically, encouraged to rinse and gargle with salt or lemon water, drink lukewarm soups, \par - prescribed atrovent HFA for nasal congestion. \par - RTC as needed or for next annual.

## 2020-02-04 NOTE — PHYSICAL EXAM
[No Acute Distress] : no acute distress [Normal Voice/Communication] : normal voice/communication [Normal Sclera/Conjunctiva] : normal sclera/conjunctiva [Normal Outer Ear/Nose] : the outer ears and nose were normal in appearance [No Respiratory Distress] : no respiratory distress  [No JVD] : no jugular venous distention [Normal Rate] : normal rate  [No Carotid Bruits] : no carotid bruits [No Edema] : there was no peripheral edema [Soft] : abdomen soft [Normal Supraclavicular Nodes] : no supraclavicular lymphadenopathy [No HSM] : no HSM [No Joint Swelling] : no joint swelling [Normal Anterior Cervical Nodes] : no anterior cervical lymphadenopathy [No CVA Tenderness] : no CVA  tenderness [Coordination Grossly Intact] : coordination grossly intact [No Rash] : no rash [Normal Mood] : the mood was normal [Speech Grossly Normal] : speech grossly normal [de-identified] : no tender cervical lymphadenopathy anterior or posterior [de-identified] : mild erythema in the oropharynx, no exudate, no sinus pressure pain when bending forward

## 2020-02-05 DIAGNOSIS — J06.9 ACUTE UPPER RESPIRATORY INFECTION, UNSPECIFIED: ICD-10-CM

## 2020-02-08 ENCOUNTER — EMERGENCY (EMERGENCY)
Facility: HOSPITAL | Age: 45
LOS: 1 days | Discharge: HOME | End: 2020-02-08
Attending: EMERGENCY MEDICINE
Payer: MEDICAID

## 2020-02-08 VITALS
DIASTOLIC BLOOD PRESSURE: 53 MMHG | TEMPERATURE: 98 F | HEIGHT: 63 IN | HEART RATE: 60 BPM | SYSTOLIC BLOOD PRESSURE: 137 MMHG | RESPIRATION RATE: 17 BRPM | WEIGHT: 106.04 LBS | OXYGEN SATURATION: 98 %

## 2020-02-08 VITALS
HEART RATE: 64 BPM | DIASTOLIC BLOOD PRESSURE: 73 MMHG | RESPIRATION RATE: 17 BRPM | TEMPERATURE: 99 F | OXYGEN SATURATION: 100 % | SYSTOLIC BLOOD PRESSURE: 114 MMHG

## 2020-02-08 DIAGNOSIS — Z98.890 OTHER SPECIFIED POSTPROCEDURAL STATES: Chronic | ICD-10-CM

## 2020-02-08 LAB
ALBUMIN SERPL ELPH-MCNC: 4.5 G/DL — SIGNIFICANT CHANGE UP (ref 3.3–5)
ALP SERPL-CCNC: 47 U/L — SIGNIFICANT CHANGE UP (ref 40–120)
ALT FLD-CCNC: 14 U/L — SIGNIFICANT CHANGE UP (ref 10–45)
ANION GAP SERPL CALC-SCNC: 14 MMOL/L — SIGNIFICANT CHANGE UP (ref 5–17)
APPEARANCE UR: CLEAR — SIGNIFICANT CHANGE UP
AST SERPL-CCNC: 16 U/L — SIGNIFICANT CHANGE UP (ref 10–40)
BACTERIA # UR AUTO: NEGATIVE — SIGNIFICANT CHANGE UP
BASOPHILS # BLD AUTO: 0.07 K/UL — SIGNIFICANT CHANGE UP (ref 0–0.2)
BASOPHILS NFR BLD AUTO: 0.9 % — SIGNIFICANT CHANGE UP (ref 0–2)
BILIRUB SERPL-MCNC: 0.2 MG/DL — SIGNIFICANT CHANGE UP (ref 0.2–1.2)
BILIRUB UR-MCNC: NEGATIVE — SIGNIFICANT CHANGE UP
BUN SERPL-MCNC: 11 MG/DL — SIGNIFICANT CHANGE UP (ref 7–23)
CALCIUM SERPL-MCNC: 9.5 MG/DL — SIGNIFICANT CHANGE UP (ref 8.4–10.5)
CHLORIDE SERPL-SCNC: 101 MMOL/L — SIGNIFICANT CHANGE UP (ref 96–108)
CO2 SERPL-SCNC: 25 MMOL/L — SIGNIFICANT CHANGE UP (ref 22–31)
COLOR SPEC: COLORLESS — SIGNIFICANT CHANGE UP
CREAT SERPL-MCNC: 0.74 MG/DL — SIGNIFICANT CHANGE UP (ref 0.5–1.3)
DIFF PNL FLD: NEGATIVE — SIGNIFICANT CHANGE UP
EOSINOPHIL # BLD AUTO: 0.36 K/UL — SIGNIFICANT CHANGE UP (ref 0–0.5)
EOSINOPHIL NFR BLD AUTO: 4.6 % — SIGNIFICANT CHANGE UP (ref 0–6)
EPI CELLS # UR: 0 /HPF — SIGNIFICANT CHANGE UP
GLUCOSE SERPL-MCNC: 86 MG/DL — SIGNIFICANT CHANGE UP (ref 70–99)
GLUCOSE UR QL: NEGATIVE — SIGNIFICANT CHANGE UP
HCT VFR BLD CALC: 39.8 % — SIGNIFICANT CHANGE UP (ref 34.5–45)
HGB BLD-MCNC: 12.8 G/DL — SIGNIFICANT CHANGE UP (ref 11.5–15.5)
HYALINE CASTS # UR AUTO: 0 /LPF — SIGNIFICANT CHANGE UP (ref 0–2)
IMM GRANULOCYTES NFR BLD AUTO: 0.1 % — SIGNIFICANT CHANGE UP (ref 0–1.5)
KETONES UR-MCNC: NEGATIVE — SIGNIFICANT CHANGE UP
LEUKOCYTE ESTERASE UR-ACNC: NEGATIVE — SIGNIFICANT CHANGE UP
LYMPHOCYTES # BLD AUTO: 3.49 K/UL — HIGH (ref 1–3.3)
LYMPHOCYTES # BLD AUTO: 45 % — HIGH (ref 13–44)
MCHC RBC-ENTMCNC: 29.5 PG — SIGNIFICANT CHANGE UP (ref 27–34)
MCHC RBC-ENTMCNC: 32.2 GM/DL — SIGNIFICANT CHANGE UP (ref 32–36)
MCV RBC AUTO: 91.7 FL — SIGNIFICANT CHANGE UP (ref 80–100)
MONOCYTES # BLD AUTO: 0.42 K/UL — SIGNIFICANT CHANGE UP (ref 0–0.9)
MONOCYTES NFR BLD AUTO: 5.4 % — SIGNIFICANT CHANGE UP (ref 2–14)
NEUTROPHILS # BLD AUTO: 3.41 K/UL — SIGNIFICANT CHANGE UP (ref 1.8–7.4)
NEUTROPHILS NFR BLD AUTO: 44 % — SIGNIFICANT CHANGE UP (ref 43–77)
NITRITE UR-MCNC: NEGATIVE — SIGNIFICANT CHANGE UP
NRBC # BLD: 0 /100 WBCS — SIGNIFICANT CHANGE UP (ref 0–0)
PH UR: 6 — SIGNIFICANT CHANGE UP (ref 5–8)
PLATELET # BLD AUTO: 353 K/UL — SIGNIFICANT CHANGE UP (ref 150–400)
POTASSIUM SERPL-MCNC: 4 MMOL/L — SIGNIFICANT CHANGE UP (ref 3.5–5.3)
POTASSIUM SERPL-SCNC: 4 MMOL/L — SIGNIFICANT CHANGE UP (ref 3.5–5.3)
PROT SERPL-MCNC: 7.6 G/DL — SIGNIFICANT CHANGE UP (ref 6–8.3)
PROT UR-MCNC: NEGATIVE — SIGNIFICANT CHANGE UP
RBC # BLD: 4.34 M/UL — SIGNIFICANT CHANGE UP (ref 3.8–5.2)
RBC # FLD: 12.2 % — SIGNIFICANT CHANGE UP (ref 10.3–14.5)
RBC CASTS # UR COMP ASSIST: 1 /HPF — SIGNIFICANT CHANGE UP (ref 0–4)
SODIUM SERPL-SCNC: 140 MMOL/L — SIGNIFICANT CHANGE UP (ref 135–145)
SP GR SPEC: 1.01 — LOW (ref 1.01–1.02)
UROBILINOGEN FLD QL: NEGATIVE — SIGNIFICANT CHANGE UP
WBC # BLD: 7.76 K/UL — SIGNIFICANT CHANGE UP (ref 3.8–10.5)
WBC # FLD AUTO: 7.76 K/UL — SIGNIFICANT CHANGE UP (ref 3.8–10.5)
WBC UR QL: 0 /HPF — SIGNIFICANT CHANGE UP (ref 0–5)

## 2020-02-08 PROCEDURE — 85027 COMPLETE CBC AUTOMATED: CPT

## 2020-02-08 PROCEDURE — 81001 URINALYSIS AUTO W/SCOPE: CPT

## 2020-02-08 PROCEDURE — 80053 COMPREHEN METABOLIC PANEL: CPT

## 2020-02-08 PROCEDURE — 93010 ELECTROCARDIOGRAM REPORT: CPT

## 2020-02-08 PROCEDURE — 81025 URINE PREGNANCY TEST: CPT

## 2020-02-08 PROCEDURE — 87086 URINE CULTURE/COLONY COUNT: CPT

## 2020-02-08 PROCEDURE — 99284 EMERGENCY DEPT VISIT MOD MDM: CPT

## 2020-02-08 PROCEDURE — 93005 ELECTROCARDIOGRAM TRACING: CPT

## 2020-02-08 PROCEDURE — 99283 EMERGENCY DEPT VISIT LOW MDM: CPT

## 2020-02-08 RX ORDER — ACETAMINOPHEN 500 MG
650 TABLET ORAL ONCE
Refills: 0 | Status: COMPLETED | OUTPATIENT
Start: 2020-02-08 | End: 2020-02-08

## 2020-02-08 NOTE — ED PROVIDER NOTE - PHYSICAL EXAMINATION
GENERAL: Anxious appearing, well-groomed, well-developed  HEAD:  Atraumatic, Normocephalic  EYES: EOMI, PERRLA, conjunctiva and sclera clear  ENMT: No tonsillar erythema, exudates, or enlargement; Moist mucous membranes  NECK: Supple  NERVOUS SYSTEM:  Alert & Oriented X3, Good concentration  CHEST/LUNG: Clear to auscultation bilaterally; No rales, rhonchi, wheezing, or rubs  HEART: Regular rate and rhythm; No murmurs, rubs, or gallops  ABDOMEN: Soft, Nontender, Nondistended; Bowel sounds present  EXTREMITIES: 1+ Peripheral pulses bilateral UE, No LE edema GENERAL: Anxious appearing, well-groomed, well-developed  HEAD:  Atraumatic, Normocephalic  EYES: EOMI, PERRLA, conjunctiva and sclera clear  ENMT: No tonsillar erythema, exudates, or enlargement; Moist mucous membranes  NECK: Supple  NERVOUS SYSTEM:  Alert & Oriented X3, Good concentration  CHEST/LUNG: Clear to auscultation bilaterally; No rales, rhonchi, wheezing, or rubs  HEART: Regular rate and rhythm; No murmurs, rubs, or gallops  ABDOMEN: Soft, Nontender, Nondistended; Bowel sounds present  EXTREMITIES: 1+ Peripheral pulses bilateral UE, No LE edema  **ATTENDING ADDENDUM (Dr. Benjie Pka): I have reviewed and substantially contributed to the elements of the PE as documented above. I have directly performed an examination of this patient in conjunction with the other members (EM resident/PA/NP) of the patient care team. GENERAL: Anxious appearing, well-groomed, well-developed  HEAD:  Atraumatic, Normocephalic  EYES: EOMI, PERRLA, conjunctiva and sclera clear  ENMT: No tonsillar erythema, exudates, or enlargement; Moist mucous membranes  NECK: Supple  NERVOUS SYSTEM:  Alert & Oriented X3, Good concentration  CHEST/LUNG: Clear to auscultation bilaterally; No rales, rhonchi, wheezing, or rubs  HEART: Regular rate and rhythm; No murmurs, rubs, or gallops  ABDOMEN: Soft, Nontender, Nondistended; Bowel sounds present  EXTREMITIES: 1+ Peripheral pulses bilateral UE, No LE edema  **ATTENDING ADDENDUM (Dr. Benjie Pak): I have reviewed and substantially contributed to the elements of the PE as documented above. I have directly performed an examination of this patient in conjunction with the other members (EM resident/PA/NP/MS4) of the patient care team.

## 2020-02-08 NOTE — ED PROVIDER NOTE - CLINICAL SUMMARY MEDICAL DECISION MAKING FREE TEXT BOX
44f w hx fibromyalgia here for chronic abd pain w/o fever, vomiting, weight loss or any acute sx. Pt appears NAD w normal VS and soft NT abd. Possible MSK etiology given pain worsened w changes in position. Plan for basic labs, UA and reeval 44f w hx fibromyalgia here for chronic abd pain w/o fever, vomiting, weight loss or any acute sx. Pt appears NAD w normal VS and soft NT abd. Possible MSK etiology given pain worsened w changes in position. Plan for basic labs, UA and reeval  **ATTENDING MEDICAL DECISION MAKING/SYNTHESIS (Dr. Benjie Pak): I have reviewed the Chief Complaint, the HPI, the ROS, and have directly performed and confirmed the findings on the Physical Examination. I have reviewed the medical decision making with all providers, as applicable. The PROBLEM REPRESENTATION at this time is: 44-year-old woman with past medical/surgical history as noted in the EMR presenting with subacute left lower quadrant abdominal pain superimposed on chronic pain (related to fibromyalgia and other medical problems), worsening and not remitting over the past 4 weeks or so. NO antecedent trauma, fevers, chills, vomiting, frequency, urgency, hesitancy, dysuria, or flank/back pain. POSITIVE movement-associated pain (mild), reproducible with palpation in discrete areas of the left lower quadrant proximate to the left edge of the rectus abdominus. POSITIVE reproducible with attempted left lower extremity extension v. resistance. The MOST LIKELY DIAGNOSIS, and the LIST OF DIFFERENTIAL DIAGNOSES, includes (but is not limited to) the following: musculoskeletal abdominal wall sprain/strain v. hematoma (less likely) v. equivalent (likely), acute biliary disease (e.g. cholelithiasis, cholecystitis, or cholangitis), acute appendicitis, other surgical abdominal pathology e.g. abscess, diverticulitis/colitis, serious bacterial infection or sepsis/severe sepsis e.g. urinary tract infection, pyelonephritis, or equivalent, perforation, peritonitis, dehydration, electrolyte-metabolic-endocrine derangements, urologic cause e.g. obstructing ureteral stone, vascular cause e.g. AAA, dissection or equivalent, gastritis, gastroenteritis, OB/GYN-associated etiology e.g. fibroid uterus, ov. cyst v. torsion, pregnancy-associated or equivalent (NO evidence). The likelihood of each of these diagnoses has been appropriately considered in the context of this patient's presentation and my evaluation. PLAN: as described in EMR, including diagnostics, therapeutics and consultation as clinically warranted. I will continue to reevaluate the patient, including the results of all testing, and monitor response to therapy throughout the patient's course in the ED.

## 2020-02-08 NOTE — ED PROVIDER NOTE - CHIEF COMPLAINT
The patient is a 44y Female complaining of abd pain. The patient is a 44y Female complaining of suprapubic and left-sided lower quadrant abdominal pain (along the lateral edge of the left rectus abdominus muscle).

## 2020-02-08 NOTE — ED PROVIDER NOTE - EYES, MLM
Clear bilaterally, pupils equal, round and reactive to light. **ATTENDING ADDENDUM (Dr. Benjie Pak): Extraocular muscle movements intact. Clear corneas bilaterally, pupils equal and round. NO nystagmus.

## 2020-02-08 NOTE — ED ADULT NURSE NOTE - OBJECTIVE STATEMENT
1345 44 yr old HF c/o ongoing abd pain x 5 yrs., worsening x 1 wk associated with polyuria. A&Ox4. ambulatory. Deneis N/V/D, fever or chills. Color pink. skin W&D.. Lungs clear. abd soft. TTP periumbillical region. Has menses currently

## 2020-02-08 NOTE — ED PROVIDER NOTE - PROGRESS NOTE DETAILS
**ATTENDING ADDENDUM (Dr. Benjie Pak): patient serially evaluated throughout ED course. NO acute deterioration up to this time in the ED. NO severe pain elicited with palpation. POSITIVE reproducible mild pain and soreness with elevation of the left lower extremity against resistance. NO costovertebral angle tenderness with percussion bilaterally. ED diagnostics up to this time acknowledged, reviewed and noted. Possibly musculoskeletal pain. NO evidence of acute severe surgical, gynecological, vascular or medical emergency at this time. Extensive anticipatory guidance provided to patient +/or family member(s). Agree with likely discharge home with close outpatient followup with primary care physician/provider. Elizabeth Goldberger PGY-3: labs and UA unrem. Abd remains Nt. Will dc Elizabeth Goldberger PGY-3: labs and UA unrem. Abd remains Nt. Will discharge  **ATTENDING ADDENDUM (Dr. Benjie Pak): patient serially evaluated throughout ED course by ED team. Agree with above notation by EM resident Goldberger. NO evidence of acute severe surgical, gynecological, vascular or medical emergency at this time. Pain and symptoms likely musculoskeletal in origin, possibly in association with known fibromyalgia. Agree with discharge home with close outpatient followup with primary care physician/provider.

## 2020-02-08 NOTE — ED PROVIDER NOTE - AREA
**ATTENDING ADDENDUM (Dr. Benjie Pak): along left side of rectus abdominus, worse with flexion of abdomen/periumbilical/mid/lower/upper

## 2020-02-08 NOTE — ED PROVIDER NOTE - PLAN OF CARE
**ATTENDING ADDENDUM (Dr. Benjie Pak): Goals of care include resolution of emergent/urgent symptoms and concerns, and restoration to baseline level of homeostasis.

## 2020-02-08 NOTE — ED PROVIDER NOTE - CHPI ED SYMPTOMS NEG
no vomiting/no dysuria/no hematuria/no burning urination/no blood in stool/no diarrhea/**ATTENDING ADDENDUM (Dr. Benjie Pak): NO radiation to the back. NO vaginal bleeding./no abdominal distension/no chills

## 2020-02-08 NOTE — ED PROVIDER NOTE - RECENT EXPOSURE TO
none known/**ATTENDING ADDENDUM (Dr. Benjie Pak): DENIES recent travel. NO sick contacts. NO history of trauma.

## 2020-02-08 NOTE — ED PROVIDER NOTE - SKIN, MLM
Skin normal color for race, warm, dry and intact. No evidence of rash. **ATTENDING ADDENDUM (Dr. Benjie Pak): NO rashes, lesions, vesicles, cellulitis, petechiae, purpurae, track marks or ecchymoses.

## 2020-02-08 NOTE — ED PROVIDER NOTE - NSFOLLOWUPCLINICS_GEN_ALL_ED_FT
Patient Information     Patient Name MRMARCO Denson [id number] Female 2011      Patient Instructions by Tracie Tam MD at 2018  9:30 AM     Author:  Tracie Tam MD Service:  (none) Author Type:  Physician     Filed:  2018 10:48 AM Encounter Date:  2018 Status:  Signed     :  Tracie Tam MD (Physician)              Nothing by mouth after midnight the night before the procedure, unless told otherwise by the surgical staff.  No NSAIDS (ibuprofen, advil, motrin), aspirin or steroids before the procedure, may have Acetaminophen (tylenol)  Please call if fever over 100.5 within 24 hours of surgery  Any questions call [phone]            
[facility]  Gastroenterology  [address] [place] [id number]  Phone: [phone]  Fax:   Follow Up Time:

## 2020-02-08 NOTE — ED PROVIDER NOTE - OBJECTIVE STATEMENT
44F PMH Fibromyalgia and Insomnia, pw abd pain. Pt reports intermittent abd pain x 5 yrs progressing to a more consistent nature x 1 mo. and becoming more severe today. She describes variable onset, cramping but at times sharp abd pain, worsened by bending, localized to L of umbilicus, radiating to upper and lower left quadrants, of 7-9/10 severity. She states that at times her pain leads to SOB. Pt denies fever, chills, back pain, diarrhea, melena/hematochezia, dysuria, hematuria, or CP. Of note pt is not sexually active and began menstruating Tuesday (2/4), and states menstrual cycles occur q4wks. and bleeding is normally clot-like. She endorses intermittent nausea without emesis, weakness, and HA. In addition she describes pain throughout L side of her body attributed to fibromyalgia. Of note pt recently with sinus congestion and odynophagia x 3 wks, attempting palliation of sx w/ Theraflu, Advil, and Chlorpheniramine. 44F PMH Fibromyalgia and Insomnia, pw abd pain. Pt reports intermittent abd pain x 5 yrs progressing to a more consistent nature x 1 mo. and becoming more severe today. She describes variable onset, cramping but at times sharp abd pain, worsened by bending, localized to L of umbilicus, radiating to upper and lower left quadrants, of 7-9/10 severity. She states that at times her pain leads to SOB. Pt denies fever, chills, back pain, diarrhea, melena/hematochezia, dysuria, hematuria, or CP. Of note pt is not sexually active and began menstruating Tuesday (2/4), and states menstrual cycles occur q4wks. and bleeding is normally clot-like. She endorses intermittent nausea without emesis, in addition to weakness, and HA. She also describes pain throughout L side of her body attributed to fibromyalgia. Of note pt recently with sinus congestion and odynophagia x 3 wks, attempting palliation of sx w/ Theraflu, Advil, and Chlorpheniramine. 44F PMH Fibromyalgia and Insomnia, pw abd pain. Pt reports intermittent abd pain x 5 yrs progressing to a more consistent nature x 1 mo. and becoming more severe today. She describes variable onset, cramping but at times sharp abd pain, worsened by bending, localized to L of umbilicus, radiating to upper and lower left quadrants, of 7-9/10 severity. Pt denies fever, chills, back pain, diarrhea, melena/hematochezia, dysuria, hematuria, or CP. Of note pt is not sexually active and began menstruating Tuesday (2/4), and states menstrual cycles occur q4wks. She endorses intermittent nausea without emesis, in addition to weakness, and HA. She also describes pain throughout L side of her body attributed to fibromyalgia. 44F PMH Fibromyalgia and Insomnia, pw abd pain. Pt reports intermittent abd pain x 5 yrs progressing to a more consistent nature x 1 mo. and becoming more severe today. She describes variable onset, cramping but at times sharp abd pain, worsened by bending, localized to L of umbilicus, radiating to upper and lower left quadrants, of 7-9/10 severity. Pt denies fever, chills, back pain, diarrhea, melena/hematochezia, dysuria, hematuria, or CP. Of note pt is not sexually active and began menstruating Tuesday (2/4), and states menstrual cycles occur q4wks. She endorses intermittent nausea without emesis, in addition to weakness, and HA. She also describes pain throughout L side of her body attributed to fibromyalgia.  **ATTENDING ADDENDUM (Dr. Benjie Pak): I attest that I have directly and personally interviewed and examined this patient and elicited a comparable history of present illness and review of systems as documented, along with my EM resident and the MS4 on rotation in the ED. I attest that I have made significant contributions to the documentation where necessary and as noted in the EMR. Of note, and in addition to the above, the patient reports that sometimes the pain is movement-associated and reproducible with positional changes. Sharp, not truly crampy. Unlike discomfort related to previous menses. Although severity reported as "7-9/10", more objective VAS-scoring of patient's appearance is 2-3/10.

## 2020-02-08 NOTE — ED PROVIDER NOTE - NSFOLLOWUPINSTRUCTIONS_ED_ALL_ED_FT
You were seen in the emergency department for abdominal pain. Please follow up with your primary doctor and a gastroenterologist. Return to the emergency department immediately if you experience vomiting, fever or any other concerning symptoms.

## 2020-02-08 NOTE — ED PROVIDER NOTE - RESPIRATORY [+], MLM
SHORTNESS OF BREATH/**ATTENDING ADDENDUM (Dr. Benjie Pak): shortness of breath? (not reported to this ED attending) NO pleuritic chest pain in association with abdominal pain.

## 2020-02-08 NOTE — ED PROVIDER NOTE - LAB INTERPRETATION
**ATTENDING ADDENDUM (Dr. Benjie Pak): Labs reviewed. Pertinent findings include: NO evidence of severe electrolyte-metabolic-endocrine derangements, anemia, or leukocytosis. NO evidence of urinary tract infection or severe dehydration at this time.

## 2020-02-08 NOTE — ED ADULT NURSE REASSESSMENT NOTE - NS ED NURSE REASSESS COMMENT FT1
Report received from JALYN Jacobo, patient resting in bed. A&Ox3, breathing spontaneous and unlabored. Patient reports 9/10 abdominal pain. Tylenol ordered and patient refused. MD Goldberger aware. No interventions at this time. Awaiting lab results at this time.

## 2020-02-08 NOTE — ED PROVIDER NOTE - FAMILY HISTORY
Grandparent  Still living? Unknown  Family history of cervical cancer, Age at diagnosis: Age Unknown  Family history of uterine cancer, Age at diagnosis: Age Unknown     Aunt  Still living? Unknown  Family history of bone cancer, Age at diagnosis: Age Unknown  Family history of skin cancer, Age at diagnosis: Age Unknown

## 2020-02-08 NOTE — ED PROVIDER NOTE - RESPIRATORY, MLM
Breath sounds clear and equal bilaterally. **ATTENDING ADDENDUM (Dr. Benjie Pak): NO wheezing, rales, rhonchi, crackles, stridor, drooling, retractions, nasal flaring, or tripoding.

## 2020-02-08 NOTE — ED PROVIDER NOTE - CARE PLAN
Principal Discharge DX:	Chronic abdominal pain Principal Discharge DX:	Chronic abdominal pain  Goal:	**ATTENDING ADDENDUM (Dr. Benjie Pak): Goals of care include resolution of emergent/urgent symptoms and concerns, and restoration to baseline level of homeostasis.  Secondary Diagnosis:	Fibromyalgia  Secondary Diagnosis:	Abdominal wall pain in left lower quadrant

## 2020-02-08 NOTE — ED PROVIDER NOTE - ATTENDING CONTRIBUTION TO CARE
**ATTENDING ADDENDUM (Dr. Benjie Pak): I attest that I have directly examined this patient and reviewed and formulated the diagnostic and therapeutic management plan in collaboration with the EM resident and the MS4 on rotation in the ED. Please see MDM note and remainder of EMR for findings from CC, HPI, ROS, and PE. (Goldberger/Stefan)

## 2020-02-08 NOTE — ED PROVIDER NOTE - NS ED ROS FT
CONSTITUTIONAL: No fever  EYES: No visual disturbances. +poor vision  ENMT: +sinus and throat pain  NECK: No pain  RESPIRATORY: +SOB w/ pain  CARDIOVASCULAR: No chest pain, dizziness  GASTROINTESTINAL: as per HPI  GENITOURINARY: No dysuria, hematuria  NEUROLOGICAL: +HA, weakness  MUSCULOSKELETAL: as per HPI CONSTITUTIONAL: No fever  EYES: No visual disturbances. +poor vision  ENMT: +sinus and throat pain  NECK: No pain  RESPIRATORY: +SOB w/ pain  CARDIOVASCULAR: No chest pain, dizziness  GASTROINTESTINAL: as per HPI  GENITOURINARY: No dysuria, hematuria  NEUROLOGICAL: +HA, weakness  MUSCULOSKELETAL: as per HPI  **ATTENDING ADDENDUM (Dr. Benjie Pak): During my interview with the patient, I have personally obtained and/or have directly verified the elements in the past medical/surgical history and other histories as noted earlier in the EMR,  in conjunction with the other members (EM resident/PA/NP/MS4) of the patient care team. I have also personally obtained and/or have directly verified/reviewed the review of systems as documented below, in conjunction with the other members (EM resident/PA/NP/MS4) of the patient care team.

## 2020-02-08 NOTE — ED PROVIDER NOTE - ABDOMINAL TENDER
**ATTENDING ADDENDUM (Dr. Benjie Pak): POSITIVE tenderness noted just lateral to the left edge of the rectus abdominus. Worsened with flexion of extended left lower extremity against resistance or with moving from supine to upright positioning. POSITIVE reproducible tenderness in this left-of-rectus abdominus edge with palpation. NO hernias. NO guarding, rebound, or rigidity. NO pulsatile or non-pulsatile masses. NO hernias. NO obvious hepatosplenomegaly. NO tenderness with jumping or with percussion over the right or left lower quadrant abdominal areas. NO costovertebral angle tenderness bilaterally./suprapubic

## 2020-02-08 NOTE — ED PROVIDER NOTE - MUSCULOSKELETAL, MLM
Spine appears normal, range of motion is not limited, no muscle or joint tenderness **ATTENDING ADDENDUM (Dr. Benjie Pak): NO back or costovertebral angle tenderness bilaterally with percussion.

## 2020-02-08 NOTE — ED PROVIDER NOTE - ENMT, MLM
Airway patent, Nasal mucosa clear. Mouth with normal mucosa. Throat has no vesicles, no oropharyngeal exudates and uvula is midline. **ATTENDING ADDENDUM (Dr. Benjie Pak): NO pharyngeal erythema, ulcers, vesicles or exudates. NO posterior palatal petechiae. NO difficulty swallowing. NO obvious phayngitis.

## 2020-02-09 LAB
CULTURE RESULTS: SIGNIFICANT CHANGE UP
SPECIMEN SOURCE: SIGNIFICANT CHANGE UP

## 2020-02-10 NOTE — DISCUSSION/SUMMARY
[FreeTextEntry1] : Patient seen in ED for left lower quadrant abdominal pain superimposed on chronic pain. Labs WNL. Thought to be 2/2 to fibromylagia

## 2020-02-18 ENCOUNTER — FORM ENCOUNTER (OUTPATIENT)
Age: 45
End: 2020-02-18

## 2020-02-18 PROBLEM — M79.7 FIBROMYALGIA: Chronic | Status: ACTIVE | Noted: 2020-02-08

## 2020-02-18 PROBLEM — G47.00 INSOMNIA, UNSPECIFIED: Chronic | Status: ACTIVE | Noted: 2020-02-08

## 2020-02-18 PROBLEM — B97.7 PAPILLOMAVIRUS AS THE CAUSE OF DISEASES CLASSIFIED ELSEWHERE: Chronic | Status: ACTIVE | Noted: 2020-02-08

## 2020-02-19 ENCOUNTER — APPOINTMENT (OUTPATIENT)
Dept: MRI IMAGING | Facility: IMAGING CENTER | Age: 45
End: 2020-02-19
Payer: COMMERCIAL

## 2020-02-19 ENCOUNTER — LABORATORY RESULT (OUTPATIENT)
Age: 45
End: 2020-02-19

## 2020-02-19 ENCOUNTER — OUTPATIENT (OUTPATIENT)
Dept: OUTPATIENT SERVICES | Facility: HOSPITAL | Age: 45
LOS: 1 days | End: 2020-02-19
Payer: SELF-PAY

## 2020-02-19 ENCOUNTER — APPOINTMENT (OUTPATIENT)
Dept: OBGYN | Facility: CLINIC | Age: 45
End: 2020-02-19
Payer: COMMERCIAL

## 2020-02-19 DIAGNOSIS — R10.2 PELVIC AND PERINEAL PAIN: ICD-10-CM

## 2020-02-19 DIAGNOSIS — Z98.890 OTHER SPECIFIED POSTPROCEDURAL STATES: Chronic | ICD-10-CM

## 2020-02-19 DIAGNOSIS — N64.89 OTHER SPECIFIED DISORDERS OF BREAST: ICD-10-CM

## 2020-02-19 DIAGNOSIS — N63.0 UNSPECIFIED LUMP IN UNSPECIFIED BREAST: ICD-10-CM

## 2020-02-19 DIAGNOSIS — Z34.80 ENCOUNTER FOR SUPERVISION OF OTHER NORMAL PREGNANCY, UNSPECIFIED TRIMESTER: ICD-10-CM

## 2020-02-19 PROCEDURE — C8937: CPT

## 2020-02-19 PROCEDURE — A9585: CPT

## 2020-02-19 PROCEDURE — 87491 CHLMYD TRACH DNA AMP PROBE: CPT

## 2020-02-19 PROCEDURE — G0463: CPT

## 2020-02-19 PROCEDURE — 87591 N.GONORRHOEAE DNA AMP PROB: CPT

## 2020-02-19 PROCEDURE — 99213 OFFICE O/P EST LOW 20 MIN: CPT | Mod: NC

## 2020-02-19 PROCEDURE — 77049 MRI BREAST C-+ W/CAD BI: CPT | Mod: 26

## 2020-02-19 PROCEDURE — C8908: CPT

## 2020-02-19 NOTE — PHYSICAL EXAM
[Mass] : breast mass [Tender] : tenderness [Nipple Discharge] : no nipple discharge [Examination Of The Breasts] : a normal appearance [Axillary LAD] : no axillary lymphadenopathy [Diffuse Fibrous Tissue In The Left Breast] : fibrocystic changes [Normal] : normal [Axillary Lymph Nodes Enlarged Bilaterally] : no enlarged nodes [Breast Mass Left Breast ___cm] : no mass was palpable

## 2020-02-20 LAB
C TRACH RRNA SPEC QL NAA+PROBE: SIGNIFICANT CHANGE UP
N GONORRHOEA RRNA SPEC QL NAA+PROBE: SIGNIFICANT CHANGE UP
SPECIMEN SOURCE: SIGNIFICANT CHANGE UP

## 2020-03-03 ENCOUNTER — APPOINTMENT (OUTPATIENT)
Dept: INTERNAL MEDICINE | Facility: CLINIC | Age: 45
End: 2020-03-03

## 2020-03-03 ENCOUNTER — OUTPATIENT (OUTPATIENT)
Dept: OUTPATIENT SERVICES | Facility: HOSPITAL | Age: 45
LOS: 1 days | End: 2020-03-03
Payer: SELF-PAY

## 2020-03-03 VITALS
WEIGHT: 104 LBS | BODY MASS INDEX: 20.96 KG/M2 | SYSTOLIC BLOOD PRESSURE: 110 MMHG | HEART RATE: 54 BPM | DIASTOLIC BLOOD PRESSURE: 70 MMHG | HEIGHT: 59 IN

## 2020-03-03 VITALS
SYSTOLIC BLOOD PRESSURE: 110 MMHG | HEART RATE: 54 BPM | OXYGEN SATURATION: 97 % | DIASTOLIC BLOOD PRESSURE: 70 MMHG | WEIGHT: 104 LBS | HEIGHT: 59 IN | BODY MASS INDEX: 20.96 KG/M2

## 2020-03-03 DIAGNOSIS — Z98.890 OTHER SPECIFIED POSTPROCEDURAL STATES: Chronic | ICD-10-CM

## 2020-03-03 DIAGNOSIS — I10 ESSENTIAL (PRIMARY) HYPERTENSION: ICD-10-CM

## 2020-03-03 PROCEDURE — G0463: CPT

## 2020-03-03 RX ORDER — LIDOCAINE AND PRILOCAINE 25; 25 MG/G; MG/G
2.5-2.5 CREAM TOPICAL
Qty: 1 | Refills: 0 | Status: DISCONTINUED | COMMUNITY
Start: 2019-08-22 | End: 2020-03-03

## 2020-03-08 NOTE — HISTORY OF PRESENT ILLNESS
[FreeTextEntry1] : ED follow-up [de-identified] : Ms. Arce is a 44 year old female with a history of fibromyalgia who presents for follow-up visit following ED visit February 8th, 2020. She presented there for several months of worsening left sided abdominal pain, her CBC and U/A were normal and patient was recommended to take NSAID's for likely musculoskeletal pain and to follow up with GYN. Patient saw gynecology 03/19/2020, had normal GC/Chlam screen and sent for Pelvic U/S and TVUS. Patient has not yet had her ultrasounds, is waiting to finish her period. \par \par Ms. Arce explains she has always dealt with some low level of abdominal pain but over the last three months noticed more pain at two spots on the left side--worse when moving at work but otherwise not associated with food intake, BM's, her periods, urination. She says her periods the last two months have been heavier than previously, sometimes with clots and darker brown color. No dizziness, lightheadedness, sweating, but does feel somewhat fatigued. CBC in ED with normal Hgb, CMP nml. No n/v, diarrhea, constipation, f/c.

## 2020-03-08 NOTE — ASSESSMENT
[FreeTextEntry1] : Ms. Arce is a 44 year old female with a history of fibromyalgia who presents for follow-up visit following ED visit February 8th, 2020. She saw gynecology since then, labwork normal and pending U/S ordered by Gyn. Overall her pain is stable, manageable and not debilitating at this time\par \par 1. LLQ Abdominal pain: Unclear etiology given questionable timing and it not associated with bowel, urinary, gynecologic habits. Possible MSK given fibromyalgia, though also possible gynecologic in nature (ovarian cyst, inflammation) given heavier periods the last two months\par       -Encouraged patient to schedule Pelvic US and TVUS\par       -Counseled on Tylenol/NSAID dosing if she is interested in pain medication\par \par Patient continues to refuse Flu shot\par \par Patient seen and discussed with Dr. Rico,\par \par Sreedhar Griggs, PGY-1

## 2020-03-08 NOTE — PHYSICAL EXAM
[No Acute Distress] : no acute distress [Well Nourished] : well nourished [Well Developed] : well developed [EOMI] : extraocular movements intact [Normal Sclera/Conjunctiva] : normal sclera/conjunctiva [Normal Oropharynx] : the oropharynx was normal [Normal Outer Ear/Nose] : the outer ears and nose were normal in appearance [Supple] : supple [No Respiratory Distress] : no respiratory distress  [No Lymphadenopathy] : no lymphadenopathy [No Accessory Muscle Use] : no accessory muscle use [Clear to Auscultation] : lungs were clear to auscultation bilaterally [Normal Rate] : normal rate  [Regular Rhythm] : with a regular rhythm [No Edema] : there was no peripheral edema [No Extremity Clubbing/Cyanosis] : no extremity clubbing/cyanosis [Soft] : abdomen soft [Non-distended] : non-distended [No Masses] : no abdominal mass palpated [No HSM] : no HSM [Normal Bowel Sounds] : normal bowel sounds [No CVA Tenderness] : no CVA  tenderness [No Spinal Tenderness] : no spinal tenderness [No Joint Swelling] : no joint swelling [Grossly Normal Strength/Tone] : grossly normal strength/tone [Coordination Grossly Intact] : coordination grossly intact [No Focal Deficits] : no focal deficits [Normal Affect] : the affect was normal [Normal Insight/Judgement] : insight and judgment were intact [de-identified] : No rebound tenderness or guarding. Mild tenderness to deep palpation LLQ [de-identified] : Paraspinal tenderness lower back bilaterally

## 2020-03-08 NOTE — REVIEW OF SYSTEMS
[Fatigue] : fatigue [Abdominal Pain] : abdominal pain [Fever] : no fever [Chills] : no chills [Discharge] : no discharge [Pain] : no pain [Earache] : no earache [Sore Throat] : no sore throat [Postnasal Drip] : no postnasal drip [Chest Pain] : no chest pain [Palpitations] : no palpitations [Lower Ext Edema] : no lower extremity edema [Shortness Of Breath] : no shortness of breath [Cough] : no cough [Nausea] : no nausea [Constipation] : no constipation [Diarrhea] : diarrhea [Vomiting] : no vomiting [Melena] : no melena [Dysuria] : no dysuria [Frequency] : no frequency [Joint Pain] : no joint pain [Back Pain] : no back pain [Itching] : no itching [Skin Rash] : no skin rash [Headache] : no headache [Dizziness] : no dizziness [Fainting] : no fainting

## 2020-03-10 DIAGNOSIS — R10.32 LEFT LOWER QUADRANT PAIN: ICD-10-CM

## 2020-03-18 ENCOUNTER — APPOINTMENT (OUTPATIENT)
Dept: ULTRASOUND IMAGING | Facility: IMAGING CENTER | Age: 45
End: 2020-03-18
Payer: COMMERCIAL

## 2020-03-18 ENCOUNTER — OUTPATIENT (OUTPATIENT)
Dept: OUTPATIENT SERVICES | Facility: HOSPITAL | Age: 45
LOS: 1 days | End: 2020-03-18
Payer: SELF-PAY

## 2020-03-18 DIAGNOSIS — R10.2 PELVIC AND PERINEAL PAIN: ICD-10-CM

## 2020-03-18 DIAGNOSIS — Z98.890 OTHER SPECIFIED POSTPROCEDURAL STATES: Chronic | ICD-10-CM

## 2020-03-18 DIAGNOSIS — N63.0 UNSPECIFIED LUMP IN UNSPECIFIED BREAST: ICD-10-CM

## 2020-03-18 PROCEDURE — 76830 TRANSVAGINAL US NON-OB: CPT | Mod: 26

## 2020-03-18 PROCEDURE — 76830 TRANSVAGINAL US NON-OB: CPT

## 2020-03-18 PROCEDURE — 76856 US EXAM PELVIC COMPLETE: CPT

## 2020-03-18 PROCEDURE — 76856 US EXAM PELVIC COMPLETE: CPT | Mod: 26

## 2020-03-24 ENCOUNTER — APPOINTMENT (OUTPATIENT)
Dept: SURGICAL ONCOLOGY | Facility: CLINIC | Age: 45
End: 2020-03-24

## 2020-03-30 ENCOUNTER — APPOINTMENT (OUTPATIENT)
Dept: INTERNAL MEDICINE | Facility: CLINIC | Age: 45
End: 2020-03-30

## 2020-03-30 ENCOUNTER — OUTPATIENT (OUTPATIENT)
Dept: OUTPATIENT SERVICES | Facility: HOSPITAL | Age: 45
LOS: 1 days | End: 2020-03-30
Payer: SELF-PAY

## 2020-03-30 DIAGNOSIS — Z87.440 PERSONAL HISTORY OF URINARY (TRACT) INFECTIONS: ICD-10-CM

## 2020-03-30 DIAGNOSIS — R50.9 FEVER, UNSPECIFIED: ICD-10-CM

## 2020-03-30 DIAGNOSIS — R30.0 DYSURIA: ICD-10-CM

## 2020-03-30 DIAGNOSIS — Z98.890 OTHER SPECIFIED POSTPROCEDURAL STATES: Chronic | ICD-10-CM

## 2020-03-30 PROCEDURE — G0463: CPT

## 2020-03-31 DIAGNOSIS — I10 ESSENTIAL (PRIMARY) HYPERTENSION: ICD-10-CM

## 2020-04-02 ENCOUNTER — APPOINTMENT (OUTPATIENT)
Dept: INTERNAL MEDICINE | Facility: CLINIC | Age: 45
End: 2020-04-02

## 2020-04-02 ENCOUNTER — TRANSCRIPTION ENCOUNTER (OUTPATIENT)
Age: 45
End: 2020-04-02

## 2020-04-03 ENCOUNTER — APPOINTMENT (OUTPATIENT)
Dept: INTERNAL MEDICINE | Facility: CLINIC | Age: 45
End: 2020-04-03

## 2020-04-22 ENCOUNTER — LABORATORY RESULT (OUTPATIENT)
Age: 45
End: 2020-04-22

## 2020-04-23 ENCOUNTER — APPOINTMENT (OUTPATIENT)
Dept: OBGYN | Facility: CLINIC | Age: 45
End: 2020-04-23
Payer: COMMERCIAL

## 2020-04-23 ENCOUNTER — OUTPATIENT (OUTPATIENT)
Dept: OUTPATIENT SERVICES | Facility: HOSPITAL | Age: 45
LOS: 1 days | End: 2020-04-23
Payer: SELF-PAY

## 2020-04-23 VITALS
WEIGHT: 105.5 LBS | SYSTOLIC BLOOD PRESSURE: 122 MMHG | DIASTOLIC BLOOD PRESSURE: 80 MMHG | BODY MASS INDEX: 21.27 KG/M2 | HEIGHT: 59 IN

## 2020-04-23 DIAGNOSIS — N90.89 OTHER SPECIFIED NONINFLAMMATORY DISORDERS OF VULVA AND PERINEUM: ICD-10-CM

## 2020-04-23 DIAGNOSIS — Z98.890 OTHER SPECIFIED POSTPROCEDURAL STATES: Chronic | ICD-10-CM

## 2020-04-23 DIAGNOSIS — N76.0 ACUTE VAGINITIS: ICD-10-CM

## 2020-04-23 PROCEDURE — 99213 OFFICE O/P EST LOW 20 MIN: CPT | Mod: NC

## 2020-04-23 PROCEDURE — 87800 DETECT AGNT MULT DNA DIREC: CPT

## 2020-04-23 PROCEDURE — 87591 N.GONORRHOEAE DNA AMP PROB: CPT

## 2020-04-23 PROCEDURE — 87624 HPV HI-RISK TYP POOLED RSLT: CPT

## 2020-04-23 PROCEDURE — 87491 CHLMYD TRACH DNA AMP PROBE: CPT

## 2020-04-23 PROCEDURE — 81003 URINALYSIS AUTO W/O SCOPE: CPT

## 2020-04-23 PROCEDURE — 87086 URINE CULTURE/COLONY COUNT: CPT

## 2020-04-23 PROCEDURE — G0463: CPT

## 2020-04-23 NOTE — BEGINNING OF VISIT
[] :  [[employer] ] : [employer]   [FreeTextEntry1] : [id number] [TWNoteComboBox1] : [place]

## 2020-04-23 NOTE — HISTORY OF PRESENT ILLNESS
[Good] : being in good health [Healthy Diet] : a healthy diet [___ Month(s) Ago] : [unfilled] month(s) ago [Reproductive Age] : is of reproductive age [Normal Amount/Duration] : was of a normal amount and duration [Spotting Between  Menses] : no spotting between menses [Regular Cycle Intervals] : periods have been regular [Sexually Active] : is not sexually active [Frequency: Q ___ days] : menstrual periods occur approximately every [unfilled] days [de-identified] : No sexual activity x 7 years

## 2020-04-23 NOTE — PHYSICAL EXAM
[No Bleeding] : there was no active vaginal bleeding [Normal] : cervix [Uterine Adnexae] : were not tender and not enlarged

## 2020-04-24 ENCOUNTER — LABORATORY RESULT (OUTPATIENT)
Age: 45
End: 2020-04-24

## 2020-04-24 LAB
C TRACH RRNA SPEC QL NAA+PROBE: SIGNIFICANT CHANGE UP
CANDIDA AB TITR SER: SIGNIFICANT CHANGE UP
CULTURE RESULTS: SIGNIFICANT CHANGE UP
G VAGINALIS DNA SPEC QL NAA+PROBE: SIGNIFICANT CHANGE UP
HPV HIGH+LOW RISK DNA PNL CVX: SIGNIFICANT CHANGE UP
N GONORRHOEA RRNA SPEC QL NAA+PROBE: SIGNIFICANT CHANGE UP
SPECIMEN SOURCE: SIGNIFICANT CHANGE UP
SPECIMEN SOURCE: SIGNIFICANT CHANGE UP
T VAGINALIS SPEC QL WET PREP: SIGNIFICANT CHANGE UP

## 2020-04-27 DIAGNOSIS — N90.89 OTHER SPECIFIED NONINFLAMMATORY DISORDERS OF VULVA AND PERINEUM: ICD-10-CM

## 2020-04-27 DIAGNOSIS — N89.8 OTHER SPECIFIED NONINFLAMMATORY DISORDERS OF VAGINA: ICD-10-CM

## 2020-04-27 LAB — CYTOLOGY SPEC DOC CYTO: SIGNIFICANT CHANGE UP

## 2020-07-11 ENCOUNTER — OUTPATIENT (OUTPATIENT)
Dept: OUTPATIENT SERVICES | Facility: HOSPITAL | Age: 45
LOS: 1 days | End: 2020-07-11

## 2020-07-11 DIAGNOSIS — Z98.890 OTHER SPECIFIED POSTPROCEDURAL STATES: Chronic | ICD-10-CM

## 2020-07-11 DIAGNOSIS — Z00.00 ENCOUNTER FOR GENERAL ADULT MEDICAL EXAMINATION WITHOUT ABNORMAL FINDINGS: ICD-10-CM

## 2020-07-11 DIAGNOSIS — N63.0 UNSPECIFIED LUMP IN UNSPECIFIED BREAST: ICD-10-CM

## 2020-07-21 ENCOUNTER — APPOINTMENT (OUTPATIENT)
Dept: GASTROENTEROLOGY | Facility: HOSPITAL | Age: 45
End: 2020-07-21

## 2020-07-21 ENCOUNTER — OUTPATIENT (OUTPATIENT)
Dept: OUTPATIENT SERVICES | Facility: HOSPITAL | Age: 45
LOS: 1 days | End: 2020-07-21
Payer: SELF-PAY

## 2020-07-21 VITALS
BODY MASS INDEX: 21.17 KG/M2 | TEMPERATURE: 98.8 F | HEIGHT: 59 IN | DIASTOLIC BLOOD PRESSURE: 86 MMHG | SYSTOLIC BLOOD PRESSURE: 142 MMHG | WEIGHT: 105 LBS | HEART RATE: 80 BPM | RESPIRATION RATE: 14 BRPM

## 2020-07-21 DIAGNOSIS — R56.9 UNSPECIFIED CONVULSIONS: ICD-10-CM

## 2020-07-21 DIAGNOSIS — Z98.890 OTHER SPECIFIED POSTPROCEDURAL STATES: Chronic | ICD-10-CM

## 2020-07-21 PROCEDURE — G0463: CPT

## 2020-07-22 NOTE — PHYSICAL EXAM
[General Appearance - In No Acute Distress] : in no acute distress [General Appearance - Alert] : alert [Sclera] : the sclera and conjunctiva were normal [General Appearance - Well Nourished] : well nourished [Extraocular Movements] : extraocular movements were intact [Outer Ear] : the ears and nose were normal in appearance [Neck Appearance] : the appearance of the neck was normal [] : the neck was supple [Exaggerated Use Of Accessory Muscles For Inspiration] : no accessory muscle use [Heart Rate And Rhythm] : heart rate was normal and rhythm regular [Heart Sounds] : normal S1 and S2 [Bowel Sounds] : normal bowel sounds [Abdomen Soft] : soft [Abnormal Walk] : normal gait [Motor Tone] : muscle strength and tone were normal [No Focal Deficits] : no focal deficits [FreeTextEntry1] : tender to palpation of to left of umbilicus, +Carnett's sign

## 2020-07-22 NOTE — HISTORY OF PRESENT ILLNESS
[de-identified] : 44F with history of Hpylori s/p triple therapy, lactose intolerance, constipation, fibromyalgia presenting for worsening chronic LLQ abdominal pain. Patient states she has had this pain on and off for 5 years. She states since January it has been constant, occasionally cramping and occasionally sharp in nature. She reports she has adhered mostly to a lactose-free diet which initially help some of her symptoms of bloating. She states she will sometimes eat cream cheese or yogurt but this does not affect the pain. She also reports continued bloating - 4x/week. She has not kept track if this is related to when she eats dairy or any other foods. She reports she has 1-2 formed brown BM per day. She reports occasional nausea, no vomiting. 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 \par \par Patient was last seen in GI clinic in 2017 for similar symptoms - abdominal pain, bloating. \par She had a pelvic US in March which showed a 2x2.4x1.8 right ovarian corpus luteum cyst and 1cm endometrial polyp.

## 2020-07-22 NOTE — ASSESSMENT
[FreeTextEntry1] : 44F with history of Hpylori s/p triple therapy, lactose intolerance, constipation, fibromyalgia presenting for worsening chronic LLQ abdominal pain and bloating.\par \par #Abdominal pain\par -given history and +Carnett's sign most likely abdominal wall muscular pain\par - treated in office with injection of lidocaine and kenolog\par - advised patient if patient recurrs, can try lidocaine patch to area\par \par #bloating\par - possibly related to noncompliance with lactose-free diet\par - will r/o SIBO as well\par - lactulose breath test ordered

## 2020-07-22 NOTE — REVIEW OF SYSTEMS
[Abdominal Pain] : abdominal pain [Chills] : no chills [Fever] : no fever [Recent Weight Loss (___ Lbs)] : no recent weight loss [Red Eyes] : eyes not red [Eye Pain] : no eye pain [Sore Throat] : no sore throat [Chest Pain] : no chest pain [Lower Ext Edema] : no lower extremity edema [Shortness Of Breath] : no shortness of breath [Cough] : no cough [Vomiting] : no vomiting [Constipation] : no constipation [Diarrhea] : no diarrhea [Heartburn] : no heartburn [Melena] : no melena [Incontinence] : no incontinence [Dysuria] : no dysuria [Itching] : no itching [FreeTextEntry9] : +left sided chronic joint and muscle pains [Dizziness] : no dizziness

## 2020-07-24 DIAGNOSIS — R10.9 UNSPECIFIED ABDOMINAL PAIN: ICD-10-CM

## 2020-08-04 ENCOUNTER — APPOINTMENT (OUTPATIENT)
Dept: ULTRASOUND IMAGING | Facility: IMAGING CENTER | Age: 45
End: 2020-08-04
Payer: COMMERCIAL

## 2020-08-04 ENCOUNTER — RESULT REVIEW (OUTPATIENT)
Age: 45
End: 2020-08-04

## 2020-08-04 ENCOUNTER — OUTPATIENT (OUTPATIENT)
Dept: OUTPATIENT SERVICES | Facility: HOSPITAL | Age: 45
LOS: 1 days | End: 2020-08-04
Payer: SELF-PAY

## 2020-08-04 DIAGNOSIS — Z98.890 OTHER SPECIFIED POSTPROCEDURAL STATES: Chronic | ICD-10-CM

## 2020-08-04 DIAGNOSIS — N63.0 UNSPECIFIED LUMP IN UNSPECIFIED BREAST: ICD-10-CM

## 2020-08-04 PROCEDURE — 76641 ULTRASOUND BREAST COMPLETE: CPT | Mod: 26,50

## 2020-08-04 PROCEDURE — 76641 ULTRASOUND BREAST COMPLETE: CPT

## 2020-08-19 ENCOUNTER — EMERGENCY (EMERGENCY)
Facility: HOSPITAL | Age: 45
LOS: 1 days | Discharge: HOME | End: 2020-08-19
Attending: EMERGENCY MEDICINE
Payer: MEDICAID

## 2020-08-19 VITALS
OXYGEN SATURATION: 98 % | RESPIRATION RATE: 17 BRPM | SYSTOLIC BLOOD PRESSURE: 113 MMHG | HEART RATE: 96 BPM | TEMPERATURE: 99 F | DIASTOLIC BLOOD PRESSURE: 88 MMHG

## 2020-08-19 VITALS
RESPIRATION RATE: 16 BRPM | SYSTOLIC BLOOD PRESSURE: 135 MMHG | HEART RATE: 80 BPM | OXYGEN SATURATION: 98 % | DIASTOLIC BLOOD PRESSURE: 73 MMHG | TEMPERATURE: 98 F

## 2020-08-19 DIAGNOSIS — N83.202 UNSPECIFIED OVARIAN CYST, LEFT SIDE: ICD-10-CM

## 2020-08-19 DIAGNOSIS — Z98.890 OTHER SPECIFIED POSTPROCEDURAL STATES: Chronic | ICD-10-CM

## 2020-08-19 LAB
ALBUMIN SERPL ELPH-MCNC: 4.4 G/DL — SIGNIFICANT CHANGE UP (ref 3.3–5)
ALP SERPL-CCNC: 46 U/L — SIGNIFICANT CHANGE UP (ref 40–120)
ALT FLD-CCNC: 17 U/L — SIGNIFICANT CHANGE UP (ref 10–45)
ANION GAP SERPL CALC-SCNC: 13 MMOL/L — SIGNIFICANT CHANGE UP (ref 5–17)
AST SERPL-CCNC: 20 U/L — SIGNIFICANT CHANGE UP (ref 10–40)
BASOPHILS # BLD AUTO: 0.03 K/UL — SIGNIFICANT CHANGE UP (ref 0–0.2)
BASOPHILS NFR BLD AUTO: 0.5 % — SIGNIFICANT CHANGE UP (ref 0–2)
BILIRUB SERPL-MCNC: 0.4 MG/DL — SIGNIFICANT CHANGE UP (ref 0.2–1.2)
BUN SERPL-MCNC: 5 MG/DL — LOW (ref 7–23)
CALCIUM SERPL-MCNC: 9.6 MG/DL — SIGNIFICANT CHANGE UP (ref 8.4–10.5)
CHLORIDE SERPL-SCNC: 103 MMOL/L — SIGNIFICANT CHANGE UP (ref 96–108)
CO2 SERPL-SCNC: 22 MMOL/L — SIGNIFICANT CHANGE UP (ref 22–31)
CREAT SERPL-MCNC: 0.68 MG/DL — SIGNIFICANT CHANGE UP (ref 0.5–1.3)
EOSINOPHIL # BLD AUTO: 0.08 K/UL — SIGNIFICANT CHANGE UP (ref 0–0.5)
EOSINOPHIL NFR BLD AUTO: 1.4 % — SIGNIFICANT CHANGE UP (ref 0–6)
GLUCOSE SERPL-MCNC: 98 MG/DL — SIGNIFICANT CHANGE UP (ref 70–99)
HCG SERPL-ACNC: <2 MIU/ML — SIGNIFICANT CHANGE UP
HCT VFR BLD CALC: 41.5 % — SIGNIFICANT CHANGE UP (ref 34.5–45)
HGB BLD-MCNC: 14.1 G/DL — SIGNIFICANT CHANGE UP (ref 11.5–15.5)
IMM GRANULOCYTES NFR BLD AUTO: 0.2 % — SIGNIFICANT CHANGE UP (ref 0–1.5)
LIDOCAIN IGE QN: 32 U/L — SIGNIFICANT CHANGE UP (ref 7–60)
LYMPHOCYTES # BLD AUTO: 2.11 K/UL — SIGNIFICANT CHANGE UP (ref 1–3.3)
LYMPHOCYTES # BLD AUTO: 37.7 % — SIGNIFICANT CHANGE UP (ref 13–44)
MCHC RBC-ENTMCNC: 30.7 PG — SIGNIFICANT CHANGE UP (ref 27–34)
MCHC RBC-ENTMCNC: 34 GM/DL — SIGNIFICANT CHANGE UP (ref 32–36)
MCV RBC AUTO: 90.2 FL — SIGNIFICANT CHANGE UP (ref 80–100)
MONOCYTES # BLD AUTO: 0.41 K/UL — SIGNIFICANT CHANGE UP (ref 0–0.9)
MONOCYTES NFR BLD AUTO: 7.3 % — SIGNIFICANT CHANGE UP (ref 2–14)
NEUTROPHILS # BLD AUTO: 2.95 K/UL — SIGNIFICANT CHANGE UP (ref 1.8–7.4)
NEUTROPHILS NFR BLD AUTO: 52.9 % — SIGNIFICANT CHANGE UP (ref 43–77)
NRBC # BLD: 0 /100 WBCS — SIGNIFICANT CHANGE UP (ref 0–0)
PLATELET # BLD AUTO: 252 K/UL — SIGNIFICANT CHANGE UP (ref 150–400)
POTASSIUM SERPL-MCNC: 3.3 MMOL/L — LOW (ref 3.5–5.3)
POTASSIUM SERPL-SCNC: 3.3 MMOL/L — LOW (ref 3.5–5.3)
PROT SERPL-MCNC: 7.2 G/DL — SIGNIFICANT CHANGE UP (ref 6–8.3)
RBC # BLD: 4.6 M/UL — SIGNIFICANT CHANGE UP (ref 3.8–5.2)
RBC # FLD: 12.6 % — SIGNIFICANT CHANGE UP (ref 10.3–14.5)
SODIUM SERPL-SCNC: 138 MMOL/L — SIGNIFICANT CHANGE UP (ref 135–145)
WBC # BLD: 5.59 K/UL — SIGNIFICANT CHANGE UP (ref 3.8–10.5)
WBC # FLD AUTO: 5.59 K/UL — SIGNIFICANT CHANGE UP (ref 3.8–10.5)

## 2020-08-19 PROCEDURE — 76830 TRANSVAGINAL US NON-OB: CPT | Mod: 26

## 2020-08-19 PROCEDURE — 99284 EMERGENCY DEPT VISIT MOD MDM: CPT | Mod: 25

## 2020-08-19 PROCEDURE — 83690 ASSAY OF LIPASE: CPT

## 2020-08-19 PROCEDURE — 76830 TRANSVAGINAL US NON-OB: CPT

## 2020-08-19 PROCEDURE — 96375 TX/PRO/DX INJ NEW DRUG ADDON: CPT | Mod: XU

## 2020-08-19 PROCEDURE — 76856 US EXAM PELVIC COMPLETE: CPT | Mod: 26,59

## 2020-08-19 PROCEDURE — 85027 COMPLETE CBC AUTOMATED: CPT

## 2020-08-19 PROCEDURE — 99285 EMERGENCY DEPT VISIT HI MDM: CPT

## 2020-08-19 PROCEDURE — 96374 THER/PROPH/DIAG INJ IV PUSH: CPT | Mod: XU

## 2020-08-19 PROCEDURE — 74177 CT ABD & PELVIS W/CONTRAST: CPT | Mod: 26

## 2020-08-19 PROCEDURE — 80053 COMPREHEN METABOLIC PANEL: CPT

## 2020-08-19 PROCEDURE — 84702 CHORIONIC GONADOTROPIN TEST: CPT

## 2020-08-19 PROCEDURE — 74177 CT ABD & PELVIS W/CONTRAST: CPT

## 2020-08-19 PROCEDURE — 93975 VASCULAR STUDY: CPT

## 2020-08-19 PROCEDURE — 93975 VASCULAR STUDY: CPT | Mod: 26

## 2020-08-19 PROCEDURE — 76856 US EXAM PELVIC COMPLETE: CPT

## 2020-08-19 RX ORDER — ONDANSETRON 8 MG/1
4 TABLET, FILM COATED ORAL ONCE
Refills: 0 | Status: COMPLETED | OUTPATIENT
Start: 2020-08-19 | End: 2020-08-19

## 2020-08-19 RX ORDER — FAMOTIDINE 10 MG/ML
20 INJECTION INTRAVENOUS ONCE
Refills: 0 | Status: COMPLETED | OUTPATIENT
Start: 2020-08-19 | End: 2020-08-19

## 2020-08-19 RX ORDER — LIDOCAINE 4 G/100G
10 CREAM TOPICAL ONCE
Refills: 0 | Status: COMPLETED | OUTPATIENT
Start: 2020-08-19 | End: 2020-08-19

## 2020-08-19 RX ADMIN — LIDOCAINE 10 MILLILITER(S): 4 CREAM TOPICAL at 09:23

## 2020-08-19 RX ADMIN — ONDANSETRON 4 MILLIGRAM(S): 8 TABLET, FILM COATED ORAL at 09:23

## 2020-08-19 RX ADMIN — Medication 30 MILLILITER(S): at 09:23

## 2020-08-19 RX ADMIN — FAMOTIDINE 20 MILLIGRAM(S): 10 INJECTION INTRAVENOUS at 09:23

## 2020-08-19 NOTE — ED ADULT NURSE REASSESSMENT NOTE - NS ED NURSE REASSESS COMMENT FT1
Patient medicated for abdominal pain. One hour later patient states pain is still present in left lower quadrant, but states relief in throat and upper abdomen. safety maintained.

## 2020-08-19 NOTE — PROGRESS NOTE ADULT - PROBLEM SELECTOR PLAN 1
- no evidence of ovarian torsion or rupture on imaging  - cyst unlikely to be the underlying etiology of abdominal pain or constipation  - outpatient follow up scheduled in clinic on 8/25 @4:45 PM  - patient is clinically stable and can be discharged per ED team    Plan d/w Dr. Del Duke  PGY-1

## 2020-08-19 NOTE — ED PROVIDER NOTE - PHYSICAL EXAMINATION
Gen: AAO x 3, NAD  Skin: No rashes or lesions  HEENT: NC/AT, PERRLA, EOMI, MMM  Resp: unlabored CTAB  Cardiac: rrr s1s2, no murmurs, rubs or gallops  GI: ND, +BS, Soft, mild LLQ ttp no guarding.   Ext: no pedal edema, FROM in all extremities  Neuro: no focal deficits

## 2020-08-19 NOTE — PROGRESS NOTE ADULT - ASSESSMENT
44 y/o G0 woman with PMHx of chronic LLQ abdominal pain and fibromyalgia presents with worsening of her LLQ abdominal pain, now with new finding of 6cm L ovarian cyst not seen on prior imaging in clinic from March 2020.

## 2020-08-19 NOTE — ED ADULT NURSE NOTE - OBJECTIVE STATEMENT
44 y/o female PMHx Fibromyalgia, H pylori ulcer, HPV, Insomnia arrives to [facility] ED by car from home with c/o abdominal pain. Pt states has had pain "on the left side of belly button for last year", but now having a burning pain. Pt is unable to eat or drink, +nausea, "stomach feels like it stopped working", used an enema last night. Patient is A&Ox4. Respirations spontaneous and unlabored. Abdomen soft NT/ND. No v/d. Denies urinary symptoms. Denies fever/chills. No sick contacts. Skin is warm/dry and normal for race. Safety maintained. 46 y/o female PMHx Fibromyalgia, H pylori ulcer, HPV, Insomnia arrives to [facility] ED by car from home with c/o abdominal pain. Pt states has had pain "on the left side of belly button for last year", but now having a burning pain generalized abdomen. Pt reports unable to eat or drink, +nausea, "stomach feels like it stopped working", used an enema last night. Patient is A&Ox4. Respirations spontaneous and unlabored. Abdomen soft, +tenderness/distension. No v/d. Denies urinary symptoms. Denies fever/chills. No sick contacts. Skin is warm/dry and normal for race. IV 18G L AC. Safety maintained.

## 2020-08-19 NOTE — ED ADULT NURSE REASSESSMENT NOTE - NS ED NURSE REASSESS COMMENT FT1
Pt requesting food but updated on plan of care that she must remain NPO until CT results come back. Resting comfortably in stretcher. Safety maintained.

## 2020-08-19 NOTE — ED PROVIDER NOTE - PROGRESS NOTE DETAILS
seen and evaluated by GYN team. no acute intervention. pt with outpt GYN appt on 8/25. results of other imaging and labs discussed with pt and advised to keep the appt and strict return precautions advised. -JODI May

## 2020-08-19 NOTE — ED PROVIDER NOTE - PATIENT PORTAL LINK FT
You can access the FollowMyHealth Patient Portal offered by [facility] by registering at the following website: [email or web address]. By joining [employer]’s FollowMyHealth portal, you will also be able to view your health information using other applications (apps) compatible with our system.

## 2020-08-19 NOTE — ED ADULT NURSE REASSESSMENT NOTE - NS ED NURSE REASSESS COMMENT FT1
Pt vitals not able to be recorded within 4 hours for last set due to patient being at ultrasound. Vitals recorded upon arrival back at unit. Pt complains of hunger, but resting comfortably in stretcher. Safety maintained. Pt repeat vitals not able to be recorded within 4 hours from last set due to patient being at ultrasound. Vitals recorded upon arrival back at unit. Pt complains of hunger, but resting comfortably in stretcher. Safety maintained.

## 2020-08-19 NOTE — ED PROVIDER NOTE - NSFOLLOWUPINSTRUCTIONS_ED_ALL_ED_FT
Follow up with Gastroenterology this week for outpatient.  Stop eating spicy and acidic foods. Eat smaller meals more frequently.    PLEASE FOLLOW UP WITH GYN APPT ON 8/25 FOR FURTHER EVALUATION OF THE OVARIAN CYST.   Worsening pain, new fever, chills, nausea, vomiting, new chest pain/shortness of breath return to ER

## 2020-08-19 NOTE — ED ADULT TRIAGE NOTE - CHIEF COMPLAINT QUOTE
pt c/o epigastric burning with fullness that radiates to RUQ associated with SOB, nausea and constipation x Thurs.  pt states that she was covid postive in March.

## 2020-08-19 NOTE — ED ADULT TRIAGE NOTE - DOMESTIC TRAVEL HIGH RISK QUESTION
[address] [facility] ED  eMERGENCY dEPARTMENT eNCOUnter      Pt Name: Trinity Sullivan  MRN: [id number]  Karelygfjg 1939  Date of evaluation: 12/14/2017  Provider: Alli Najera NP     [address] [place] [address]       COE    COLONOSCOPY  06/28/2016    DR. Costello [place] Ronaldo Tuileranthony    John Paul Holcomb MD  [address] [place] [id number] [phone]            DISCHARGE MEDICATIONS:  New Prescriptions    No medications on file          (Please note that portions of this note were completed with a voice recognition program.  Efforts were made to edit the dictations but occasionally words and phrases are mis-transcribed.)    Kandi Garcia, OPAL  (electronically signed)                 Kandi Garcia, OPAL  12/14/17 0859       Brenda Saba NP  12/15/17 [id number] No

## 2020-08-19 NOTE — PROGRESS NOTE ADULT - SUBJECTIVE AND OBJECTIVE BOX
ANEL DEL REAL  45y  Female [id number]    HPI:    44 y/o G0 woman with PMHx of fibromyalgia and chronic LLQ abdominal pain presented to the ED with worsening LLQ abdominal pain. Patient states that she has had chronic LLQ abdominal pain for over a year now and is following up for it with outpatient GI. Over the past 3 days she felt "abdominal fullness up to her throat" and reported constipation for which she took laxatives at home and subsequently had a normal bowel movement yetserday. She continued to report abdominal fullness resulting in decreased PO intake. She states that the LLQ abdominal discomfort is 8/9 in severity with constant pressure-like sensation, worse with movement. Also reports intermittent LLQ sharp pain, none today. She denies abnormal vaginal discharge, F/C, headaches, N/V, CP, palpitations, SOB, abdominal pain, urinary frequency, dysuria, or leg pain.     Of note, patient is also complaining that her menses started earlier this month. She states that her cycle is typically 28-29 days and her last two cycles started on 7/20 and 8/15. Denies heavier vaginal bleeding, no intermenstrual bleeding.     ED providers obtained a CTAP showing a 6 cm L ovarian cyst and did a follow up TVUS. Consulted GYN for recommendations regarding this new finding.       Name of GYN Physician: MOHIT    POB:  G0    Pgyn: Has a history of cysts, denies history of fibroids, endometriosis, STI's, Abnormal pap smears     Home meds: none    Hospital Meds:     Allergies: No Known Allergies          PAST MEDICAL & SURGICAL HISTORY:  HPV (human papilloma virus) infection  Fibromyalgia  Insomnia  H pylori ulcer  H/O oral surgery      FAMILY HISTORY:  Family history of skin cancer (Aunt)  Family history of bone cancer (Aunt)  Family history of uterine cancer (Grandparent)      Social History:  Denies smoking use, drug use, alcohol use.   +occasional social alcohol use    Vital Signs Last 24 Hrs  T(C): 36.8 (19 Aug 2020 17:44), Max: 37.1 (19 Aug 2020 08:24)  T(F): 98.2 (19 Aug 2020 17:44), Max: 98.7 (19 Aug 2020 08:24)  HR: 80 (19 Aug 2020 17:44) (57 - 96)  BP: 135/73 (19 Aug 2020 17:44) (113/88 - 135/73)  BP(mean): --  RR: 16 (19 Aug 2020 17:44) (16 - 18)  SpO2: 98% (19 Aug 2020 17:44) (98% - 100%)    Physical Exam:   General: laying comfortably in bed, NAD   CV: RR S1S2  Lungs: CTA b/l, good air flow b/l   Back: No CVA tenderness  Abd: Soft, non-tender, non-distended.  Bowel sounds present.    :  External labia wnl. Bimanual exam with no cervical motion tenderness, uterus wnl, adnexa non palpable b/l. no masses appreciated.   Speculum Exam: Active bleeding from os.    Ext: non-tender b/l, no edema     LABS:                              14.1   5.59  )-----------( 252      ( 19 Aug 2020 09:38 )             41.5     08-19    138  |  103  |  5<L>  ----------------------------<  98  3.3<L>   |  22  |  0.68    Ca    9.6      19 Aug 2020 09:38    TPro  7.2  /  Alb  4.4  /  TBili  0.4  /  DBili  x   /  AST  20  /  ALT  17  /  AlkPhos  46  08-19    I&O's Detail          RADIOLOGY & ADDITIONAL STUDIES:    CT Abdomen and Pelvis w/ Oral Cont and w/ IV Cont (08.19.20 @ 10:44):  IMPRESSION: A 6 cm left adnexal cyst.    US Doppler Pelvis (08.19.20 @ 15:04)         IMPRESSION:    Left ovary: Posterior to the uterus in the cul-de-sac. Measures 7.2 x 4.6 x 6.2 cm inclusive of 2 cystic structures, the larger measuring 6 cm and the smaller, a cyst/dominant follicle, measuring 2 cm. The larger cystic structure demonstrates a thickened septation with internal vascularity. Normal arterial and venous waveforms in the ovarian parenchyma. A 6 cm LEFT ovarian cystic lesion with a thickened vascular septation, may reflect a cystic neoplasm. Recommend GYN consult.    No sonographic evidence of ovarian torsion at this time.    A probable endometrial polyp. Recommend further evaluation with saline sonohysterogram and/or direct visualization/tissue sampling.    Fluid: None.

## 2020-08-19 NOTE — ED PROVIDER NOTE - OBJECTIVE STATEMENT
45yof pmhx of Fibromyalgia, chronic abd pain, hx of H pylori, insomnia here with L sided sharp abd pain along with burning epigastric pain for past week. worsening with bad taste in mouth along with persistent burping. No fever or chills, +nausea with early satiety. No surgical hx. no back pain. No urinary symx. LMP July 20

## 2020-08-19 NOTE — ED PROVIDER NOTE - ATTENDING CONTRIBUTION TO CARE
Patient presenting complaining of chronic abdominal pains.  Has been having L sided abdominal pains for over a year, has seen GI about this.  Today presenting reporting pain has changed, reporting feeling nauseated but no vomiting, decreased appetite.  Reporting has been constipate and burping but not passing gas.  Has not taken any medications at home for pain.  No prior abdominal surgeries.  Denying associated chest pains, fevers, chills, dysuria.    A 14 point review of systems is negative except as in HPI or otherwise documented.    Exam:  General: Patient well appearing, vital signs within normal limits  HEENT: airway patent with moist mucous membranes  Cardiac: RRR S1/S2 with strong peripheral pulses  Respiratory: lungs clear without respiratory distress  GI: abdomen soft, scant bowel sounds throughout, non tender, non distended  Neuro: no gross neurologic deficits  Skin: warm, well perfused  Psych: normal mood and affect    Patient presenting with acute on chronic abdominal pains of unclear etiology, well appearing with benign exam however given reported lack of both stool and gas will obtain labs and imaging to r/o obstructive process, treat symptomatically.

## 2020-08-19 NOTE — ED PROVIDER NOTE - NS ED ROS FT
Constitutional: No fever or chills  Eyes: No visual changes, eye pain or redness  HEENT: No throat pain, ear pain, nasal pain. No nose bleeding.  CV: No chest pain or lower extremity edema  Resp: No SOB no cough  GI: +abd pain. +nausea +vomiting. No diarrhea. +constipation.   : No dysuria, hematuria.   MSK: No musculoskeletal pain  Skin: No rash  Neuro: No headache. No numbness or tingling. No weakness.

## 2020-08-20 NOTE — DISCUSSION/SUMMARY
[ER: _____] : ER: [unfilled] [FreeTextEntry1] : ED summary:  45yof pmhx of Fibromyalgia, chronic abd pain, hx of H pylori, insomnia here with L sided sharp abd pain along with burning epigastric pain for past week. worsening with bad taste in mouth along with\par persistent burping. No fever or chills, +nausea with early satiety. No surgicalhx. no back pain. No urinary symx. LMP July 20.\par \par  In ED patient received zofran, maalox, pepcid, and viscous lidocaine. ED providers obtained a CTAP showing a 6 cm L ovarian cyst.  Follow up TVUS showed a 6 cm LEFT ovarian cystic lesion with a thickened vascular septation, may reflect a cystic neoplasm. This was unlikely the etiology of the patients abd pain. Pt now pending outpt GI and GYN follow up. \par \par Pt states that she feels a little better now, but continues to have epigastric discomfort and feels as if food gets stuck on her throat. Pt was advised to go to ED if worsening symptoms, severe abd pain, abd distention, n/v or poor PO tolerance.

## 2020-08-20 NOTE — PLAN
[FreeTextEntry3] : Pt has appt with GYN on 8/25 and with GI on 9/1 [FreeTextEntry7] : Will task  team to make f/u appt with the IM clinic.

## 2020-08-25 ENCOUNTER — OUTPATIENT (OUTPATIENT)
Dept: OUTPATIENT SERVICES | Facility: HOSPITAL | Age: 45
LOS: 1 days | End: 2020-08-25
Payer: SELF-PAY

## 2020-08-25 ENCOUNTER — APPOINTMENT (OUTPATIENT)
Dept: OBGYN | Facility: CLINIC | Age: 45
End: 2020-08-25

## 2020-08-25 ENCOUNTER — APPOINTMENT (OUTPATIENT)
Dept: GASTROENTEROLOGY | Facility: HOSPITAL | Age: 45
End: 2020-08-25

## 2020-08-25 VITALS
RESPIRATION RATE: 14 BRPM | DIASTOLIC BLOOD PRESSURE: 86 MMHG | SYSTOLIC BLOOD PRESSURE: 133 MMHG | HEART RATE: 88 BPM | BODY MASS INDEX: 20.16 KG/M2 | HEIGHT: 59 IN | TEMPERATURE: 99.2 F | WEIGHT: 100 LBS

## 2020-08-25 DIAGNOSIS — R10.32 LEFT LOWER QUADRANT PAIN: ICD-10-CM

## 2020-08-25 DIAGNOSIS — K31.9 DISEASE OF STOMACH AND DUODENUM, UNSPECIFIED: ICD-10-CM

## 2020-08-25 DIAGNOSIS — Z98.890 OTHER SPECIFIED POSTPROCEDURAL STATES: Chronic | ICD-10-CM

## 2020-08-25 PROCEDURE — G0463: CPT

## 2020-08-26 ENCOUNTER — OUTPATIENT (OUTPATIENT)
Dept: OUTPATIENT SERVICES | Facility: HOSPITAL | Age: 45
LOS: 1 days | End: 2020-08-26
Payer: SELF-PAY

## 2020-08-26 ENCOUNTER — APPOINTMENT (OUTPATIENT)
Dept: INTERNAL MEDICINE | Facility: CLINIC | Age: 45
End: 2020-08-26

## 2020-08-26 VITALS
BODY MASS INDEX: 19.76 KG/M2 | WEIGHT: 98 LBS | DIASTOLIC BLOOD PRESSURE: 70 MMHG | SYSTOLIC BLOOD PRESSURE: 122 MMHG | HEIGHT: 59 IN

## 2020-08-26 DIAGNOSIS — R10.13 EPIGASTRIC PAIN: ICD-10-CM

## 2020-08-26 DIAGNOSIS — N83.202 UNSPECIFIED OVARIAN CYST, LEFT SIDE: ICD-10-CM

## 2020-08-26 DIAGNOSIS — Z98.890 OTHER SPECIFIED POSTPROCEDURAL STATES: Chronic | ICD-10-CM

## 2020-08-26 DIAGNOSIS — K31.9 DISEASE OF STOMACH AND DUODENUM, UNSPECIFIED: ICD-10-CM

## 2020-08-26 DIAGNOSIS — I10 ESSENTIAL (PRIMARY) HYPERTENSION: ICD-10-CM

## 2020-08-26 DIAGNOSIS — R10.9 UNSPECIFIED ABDOMINAL PAIN: ICD-10-CM

## 2020-08-26 PROBLEM — R10.32 ABDOMINAL PAIN, LLQ (LEFT LOWER QUADRANT): Status: RESOLVED | Noted: 2020-03-03 | Resolved: 2020-08-26

## 2020-08-26 PROCEDURE — G0463: CPT

## 2020-08-26 PROCEDURE — 87507 IADNA-DNA/RNA PROBE TQ 12-25: CPT

## 2020-08-26 NOTE — PHYSICAL EXAM
[General Appearance - Alert] : alert [General Appearance - In No Acute Distress] : in no acute distress [General Appearance - Well Nourished] : well nourished [General Appearance - Well Developed] : well developed [General Appearance - Well-Appearing] : healthy appearing [Sclera] : the sclera and conjunctiva were normal [Extraocular Movements] : extraocular movements were intact [Examination Of The Oral Cavity] : the lips and gums were normal [Oropharynx] : the oropharynx was normal [Neck Appearance] : the appearance of the neck was normal [] : no respiratory distress [Respiration, Rhythm And Depth] : normal respiratory rhythm and effort [Exaggerated Use Of Accessory Muscles For Inspiration] : no accessory muscle use [Auscultation Breath Sounds / Voice Sounds] : lungs were clear to auscultation bilaterally [Heart Rate And Rhythm] : heart rate was normal and rhythm regular [Heart Sounds] : normal S1 and S2 [Heart Sounds Gallop] : no gallops [Murmurs] : no murmurs [Heart Sounds Pericardial Friction Rub] : no pericardial rub [Edema] : there was no peripheral edema [Bowel Sounds] : normal bowel sounds [Abdomen Soft] : soft [Abdomen Tenderness] : non-tender [Abdomen Mass (___ Cm)] : no abdominal mass palpated [Abnormal Walk] : normal gait [Nail Clubbing] : no clubbing  or cyanosis of the fingernails [Musculoskeletal - Swelling] : no joint swelling seen [Oriented To Time, Place, And Person] : oriented to person, place, and time [FreeTextEntry1] : anxious

## 2020-08-26 NOTE — PHYSICAL EXAM
[No Acute Distress] : no acute distress [Well Nourished] : well nourished [Well-Appearing] : well-appearing [Well Developed] : well developed [Normal Sclera/Conjunctiva] : normal sclera/conjunctiva [No Respiratory Distress] : no respiratory distress  [No Accessory Muscle Use] : no accessory muscle use [Clear to Auscultation] : lungs were clear to auscultation bilaterally [Normal Rate] : normal rate  [Regular Rhythm] : with a regular rhythm [No Murmur] : no murmur heard [Normal S1, S2] : normal S1 and S2 [No Edema] : there was no peripheral edema [Soft] : abdomen soft [Normal Bowel Sounds] : normal bowel sounds [Non-distended] : non-distended [No Rash] : no rash [Normal Gait] : normal gait [Alert and Oriented x3] : oriented to person, place, and time [de-identified] : +bladder fullness  [de-identified] : TTP+ epigastric region on deep palpation No LLQ pain elicited  [de-identified] : Anxious

## 2020-08-26 NOTE — REVIEW OF SYSTEMS
[Abdominal Pain] : abdominal pain [Nausea] : nausea [Constipation] : constipation [Diarrhea] : diarrhea [Heartburn] : heartburn [Chills] : no chills [Fever] : no fever [Chest Pain] : no chest pain [Lower Ext Edema] : no lower extremity edema [Shortness Of Breath] : no shortness of breath [Cough] : no cough [Melena] : no melena [Joint Pain] : no joint pain [Back Pain] : no back pain [Dysuria] : no dysuria

## 2020-08-26 NOTE — ASSESSMENT
[FreeTextEntry1] : Ms. DEL REAL is a 45 year F with a PMH of Hpylori s/p triple therapy in 2016, lactose intolerance, constipation, fibromyalgia here for a follow up. \par \par RTC in 5-10 weeks for CPE\par \par Case d/w Dr. Johnson\par \par Zina Biswas MD\par Internal Medicine PGY-3

## 2020-08-26 NOTE — ASSESSMENT
[FreeTextEntry1] : 44F with history of Hpylori s/p triple therapy, lactose intolerance, constipation, fibromyalgia presenting for epigastric pain x 10 days and foul smelling stools. \par \par Impression\par #Epigastric pain: pt w/ 10 days of burning epigastric pain, accompanied by belching, globus sensation in the throat.Ddx  GERD vs H pylori vs esophagitis.Will need endoscopic evaluation considering early satiety. \par #H pylori: pt w/H pylori on EGD in 2016, completed triple tx however appears repeat H pylori stool Ag (+). \par #Foul smelling stools: pt w/ sticky yellow brown foul smelling stools, w/ ?(+) contact w/ diarrhea. Will r/o infectious diarrhea. \par \par Plan: \par - Repeat EGD w/ H pylori biopsies \par - If (+) H pylori would treat with quad tx\par - GI PCR\par - Pepcid PRN\par \par Leroy Ball MD\par Gastroenterology Fellow, PGY4\par

## 2020-08-26 NOTE — END OF VISIT
[] : Fellow [FreeTextEntry3] : As modified and discussed with patient\par MD KERRY Batres [facility]\par Associate Professor of Medicine\par [employer] [facility] [employer]\par

## 2020-08-26 NOTE — HISTORY OF PRESENT ILLNESS
[FreeTextEntry1] : 44 F w/ Hx  H pylori s/p triple therapy, lactose intolerance, constipation, fibromyalgia presents for epigastric burning abdominal pain x 10 days. Pt reports this is a new abdominal pain, radiates from her epigastric region to throat/mouth w/ burning/ "tart" sensation. Says the pain is there all the time, keeps her up at night, continues to feel something is "stuck" in her throat (with both  solids and liquids). Denies association with particular foods (has continued to avoid spicy foods and lactulose containing foods). \par \par Also has been having foul smelling stools during this time, says her BMs are much more "sticky" than usual--but still soft, yellow-brown stools (1 BM every day or 2 days). She feels concerned she may have gotten something from another man whom shares a bathroom with her and has "diarrhea all the time." Denies f/c /n/v. May have had weight loss of a few pounds. \par  \par

## 2020-08-26 NOTE — HISTORY OF PRESENT ILLNESS
[FreeTextEntry1] : Ms. DEL REAL is a 45 year F with a PMH of Hpylori s/p triple therapy in 2016, lactose intolerance, constipation, fibromyalgia here for a follow up.  [de-identified] : Patient reports shes been having "stomach problems" for about a month and a half. She has been having on and off episodes of foul-smelling very loose watery diarrhea. She noted that another individual who lives in an apartment with her, share the same bathroom, had diarrhea. She saw GI on 7/21 for her chronic LLQ abdominal pain that has been on and off in intensity for 5 years. She was treated with triple therapy in 2016 due to positive H. pylori as patient had constipation at that time. Unclear if she had testing to assess treatment success or treatment failure for H. pylori. During her GI visit she was given Lidocaine and Kenolog injections for presumed abdominal wall muscular pain. However patient notes her pain did not improve. About 10 days ago patient began developing epigastric burning pain radiating up through her chest through her throat. She has had early satiety, nausea, no vomiting, constipation, lack of appetite. Her epigastric pain is 9-10/10 that is constant. Denies worsening or improvement with food intake. Denies worsening while lying down at night. She has had no melena or hematochezia. Has lost about 5 lbs in the past month due to diminished eating. Has had some dysphagia and tolerating liquid foods better. Has not been monitoring her diet as carefully. She has been having some spicy foods and milk. Denies recent NSAID use or alcohol. Her epigastric pain does not radiate to the back and is not associated with the LLQ pain she has chronically had. She went to the ED 8/19 as patient felt she had "asphyxia" and was given a cocktail of pepcid, maalox, viscous lidocaine along with PRN zofran. Reports her symptoms improved. She had a CT scan A/P that showed no acute GI pathology but revealed an incidental finding of a 6 cm left ovarian cyst with TVUS showing possible cystic neoplasm. She was sent home and followed up with GI on 8/25. GI recommended EGD which she is scheduled for next week and Pepcid PRN. Patient has been avoiding the pepcid as patient felt that may affect the results of the EGD. She had an EGD in 2015 that was negative. No family history of esophageal or gastric cancers. Patient has also not been compliant with her dairy use as last week had some yogurt and ice cream. She is unable to afford almond milk so has been attempting other alternatives. Notes she is very stressed from the burning pain and lack of eating, along with the COVID-19 pandemic.

## 2020-08-31 ENCOUNTER — APPOINTMENT (OUTPATIENT)
Dept: DISASTER EMERGENCY | Facility: CLINIC | Age: 45
End: 2020-08-31

## 2020-09-01 LAB — SARS-COV-2 N GENE NPH QL NAA+PROBE: NOT DETECTED

## 2020-09-02 ENCOUNTER — OUTPATIENT (OUTPATIENT)
Dept: OUTPATIENT SERVICES | Facility: HOSPITAL | Age: 45
LOS: 1 days | End: 2020-09-02
Payer: SELF-PAY

## 2020-09-02 ENCOUNTER — RESULT REVIEW (OUTPATIENT)
Age: 45
End: 2020-09-02

## 2020-09-02 VITALS
HEIGHT: 59 IN | RESPIRATION RATE: 12 BRPM | DIASTOLIC BLOOD PRESSURE: 76 MMHG | WEIGHT: 98.11 LBS | TEMPERATURE: 97 F | SYSTOLIC BLOOD PRESSURE: 153 MMHG | HEART RATE: 58 BPM | OXYGEN SATURATION: 100 %

## 2020-09-02 VITALS
RESPIRATION RATE: 16 BRPM | OXYGEN SATURATION: 100 % | DIASTOLIC BLOOD PRESSURE: 61 MMHG | SYSTOLIC BLOOD PRESSURE: 105 MMHG

## 2020-09-02 DIAGNOSIS — Z98.890 OTHER SPECIFIED POSTPROCEDURAL STATES: Chronic | ICD-10-CM

## 2020-09-02 DIAGNOSIS — R10.9 UNSPECIFIED ABDOMINAL PAIN: ICD-10-CM

## 2020-09-02 PROCEDURE — 88312 SPECIAL STAINS GROUP 1: CPT

## 2020-09-02 PROCEDURE — 88305 TISSUE EXAM BY PATHOLOGIST: CPT

## 2020-09-02 PROCEDURE — 43239 EGD BIOPSY SINGLE/MULTIPLE: CPT | Mod: GC

## 2020-09-02 PROCEDURE — 88305 TISSUE EXAM BY PATHOLOGIST: CPT | Mod: 26

## 2020-09-02 PROCEDURE — 43239 EGD BIOPSY SINGLE/MULTIPLE: CPT

## 2020-09-02 PROCEDURE — 88312 SPECIAL STAINS GROUP 1: CPT | Mod: 26

## 2020-09-02 RX ORDER — SODIUM CHLORIDE 9 MG/ML
1000 INJECTION INTRAMUSCULAR; INTRAVENOUS; SUBCUTANEOUS
Refills: 0 | Status: DISCONTINUED | OUTPATIENT
Start: 2020-09-02 | End: 2020-09-17

## 2020-09-02 RX ORDER — LANOLIN ALCOHOL/MO/W.PET/CERES
1 CREAM (GRAM) TOPICAL
Qty: 0 | Refills: 0 | DISCHARGE

## 2020-09-02 RX ADMIN — SODIUM CHLORIDE 75 MILLILITER(S): 9 INJECTION INTRAMUSCULAR; INTRAVENOUS; SUBCUTANEOUS at 14:13

## 2020-09-02 NOTE — PRE PROCEDURE NOTE - PRE PROCEDURE EVALUATION
Attending Physician: Dr Corazon Fernandez                            Procedure: Upper Endoscopy    Indication for Procedure: Abdominal pain  ________________________________________________________  PAST MEDICAL & SURGICAL HISTORY:  HPV (human papilloma virus) infection  Fibromyalgia  Insomnia  H pylori ulcer  H/O oral surgery    ALLERGIES:  No Known Allergies    HOME MEDICATIONS:  omeprazole 20 mg oral delayed release capsule: 1 cap(s) orally once a day    AICD/PPM: [ ] yes   [X ] no    PERTINENT LAB DATA:                      PHYSICAL EXAMINATION:    T(C): --  HR: --  BP: --  RR: --  SpO2: --    Constitutional: NAD  HEENT: PERRLA, EOMI,    Neck:  No JVD  Respiratory: CTAB/L  Cardiovascular: S1 and S2  Gastrointestinal: BS+, soft, NT/ND  Extremities: No peripheral edema  Neurological: A/O x 3, no focal deficits  Psychiatric: Normal mood, normal affect  Skin: No rashes    ASA Class: I [ ]  II [X ]  III [ ]  IV [ ]    COMMENTS:    The patient is a suitable candidate for the planned procedure unless box checked [X ]  No, explain:

## 2020-09-06 LAB — SURGICAL PATHOLOGY STUDY: SIGNIFICANT CHANGE UP

## 2020-09-09 ENCOUNTER — OUTPATIENT (OUTPATIENT)
Dept: OUTPATIENT SERVICES | Facility: HOSPITAL | Age: 45
LOS: 1 days | End: 2020-09-09
Payer: SELF-PAY

## 2020-09-09 ENCOUNTER — LABORATORY RESULT (OUTPATIENT)
Age: 45
End: 2020-09-09

## 2020-09-09 ENCOUNTER — APPOINTMENT (OUTPATIENT)
Dept: OBGYN | Facility: CLINIC | Age: 45
End: 2020-09-09
Payer: COMMERCIAL

## 2020-09-09 VITALS — WEIGHT: 98 LBS | DIASTOLIC BLOOD PRESSURE: 70 MMHG | BODY MASS INDEX: 19.79 KG/M2 | SYSTOLIC BLOOD PRESSURE: 108 MMHG

## 2020-09-09 DIAGNOSIS — Z98.890 OTHER SPECIFIED POSTPROCEDURAL STATES: Chronic | ICD-10-CM

## 2020-09-09 DIAGNOSIS — N83.292 OTHER OVARIAN CYST, LEFT SIDE: ICD-10-CM

## 2020-09-09 DIAGNOSIS — Z34.80 ENCOUNTER FOR SUPERVISION OF OTHER NORMAL PREGNANCY, UNSPECIFIED TRIMESTER: ICD-10-CM

## 2020-09-09 PROCEDURE — G0463: CPT

## 2020-09-09 PROCEDURE — 82378 CARCINOEMBRYONIC ANTIGEN: CPT

## 2020-09-09 PROCEDURE — 86304 IMMUNOASSAY TUMOR CA 125: CPT

## 2020-09-09 PROCEDURE — 86301 IMMUNOASSAY TUMOR CA 19-9: CPT

## 2020-09-09 PROCEDURE — 99214 OFFICE O/P EST MOD 30 MIN: CPT | Mod: NC

## 2020-09-09 NOTE — PHYSICAL EXAM
[Awake] : awake [Alert] : alert [Acute Distress] : no acute distress [Soft] : soft [H/Smegaly] : no hepatosplenomegaly [Distended] : not distended [Tender] : tender [Oriented x3] : oriented to person, place, and time [Depressed Mood] : not depressed

## 2020-09-09 NOTE — HISTORY OF PRESENT ILLNESS
[___ Month(s) Ago] : [unfilled] month(s) ago [Healthy Diet] : a healthy diet [Good] : being in good health [Regular Exercise] : regular exercise [Weight Concerns] : weight concens [Recent Weight Loss (___ Lbs)] : recent [unfilled] ~Ulbs weight loss [Definite ___ (Date)] : the last menstrual period was [unfilled] [Menstrual Problems] : reports abnormal menses [Perimenopausal] : is perimenopausal [Contraception] : does not use contraception [Abnormal Duration ___ days] : the duration was abnormal lasting [unfilled] days [Pregnancy History] : denies prior pregnancies [Continuous] : continuous [Lower-Lt-Q] : left lower quadrant [Ovarian Mass] : ovarian mass [___ Months] : started [unfilled] months ago [Approximately ___ (Month)] : the LMP was approximately [unfilled] month(s) ago [Spotting Between  Menses] : spotting reported between menses [Regular Cycle Intervals] : periods have been irregular [Normal Amount/Duration] : was abnormal [Menstrual Cramps] : menstrual cramps

## 2020-09-09 NOTE — REVIEW OF SYSTEMS
[Abdominal Pain] : abdominal pain [Anxiety] : anxiety [Pelvic Pain] : pelvic pain [Nl] : Integumentary

## 2020-09-10 ENCOUNTER — APPOINTMENT (OUTPATIENT)
Dept: INTERNAL MEDICINE | Facility: CLINIC | Age: 45
End: 2020-09-10

## 2020-09-10 LAB
CANCER AG125 SERPL-ACNC: 6 U/ML — SIGNIFICANT CHANGE UP
CANCER AG19-9 SERPL-ACNC: 9 U/ML — SIGNIFICANT CHANGE UP
CEA SERPL-MCNC: <0.6 NG/ML — SIGNIFICANT CHANGE UP (ref 0–3.8)

## 2020-09-15 DIAGNOSIS — N83.292 OTHER OVARIAN CYST, LEFT SIDE: ICD-10-CM

## 2020-09-15 DIAGNOSIS — R10.814 LEFT LOWER QUADRANT ABDOMINAL TENDERNESS: ICD-10-CM

## 2020-09-15 DIAGNOSIS — R10.32 LEFT LOWER QUADRANT PAIN: ICD-10-CM

## 2020-09-17 ENCOUNTER — OUTPATIENT (OUTPATIENT)
Dept: OUTPATIENT SERVICES | Facility: HOSPITAL | Age: 45
LOS: 1 days | End: 2020-09-17
Payer: SELF-PAY

## 2020-09-17 ENCOUNTER — APPOINTMENT (OUTPATIENT)
Dept: OBGYN | Facility: CLINIC | Age: 45
End: 2020-09-17
Payer: COMMERCIAL

## 2020-09-17 VITALS
WEIGHT: 100 LBS | SYSTOLIC BLOOD PRESSURE: 100 MMHG | BODY MASS INDEX: 20.16 KG/M2 | DIASTOLIC BLOOD PRESSURE: 67 MMHG | HEIGHT: 59 IN

## 2020-09-17 DIAGNOSIS — N76.0 ACUTE VAGINITIS: ICD-10-CM

## 2020-09-17 DIAGNOSIS — N83.299 OTHER OVARIAN CYST, UNSPECIFIED SIDE: ICD-10-CM

## 2020-09-17 DIAGNOSIS — Z98.890 OTHER SPECIFIED POSTPROCEDURAL STATES: Chronic | ICD-10-CM

## 2020-09-17 PROCEDURE — 99201 OFFICE OUTPATIENT NEW 10 MINUTES: CPT | Mod: GE

## 2020-09-17 PROCEDURE — G0463: CPT

## 2020-09-18 NOTE — END OF VISIT
[] : Resident [FreeTextEntry3] : Agree with above assessment. Patient offered EMB today, refused, states she needs to come back for her biopsy. At that time will discuss surgery in further detail. Recc LSO, R salpingectomy if benign EMB with oncology on standby with frozen evaluation. Alll questions answered. CT scan reviewed- negative.\par \par Rossy Orta MD

## 2020-09-18 NOTE — HISTORY OF PRESENT ILLNESS
[FreeTextEntry1] : 44y/o G0, LMP 9/6, h/o chronic LLQ pain, presents as a consult for a left complex cyst recently seen on imaging. Patient reports worsening LLQ pain x1 year. She's had intermittent LLQ pain that began 5 years ago, but this past year the pain became constant and worse in severity, rated 9/10. It is sharp and associated with low back pain. She does not take analgesics for it. The patient was seen recently in the ED for this pain, and at that time she was found to have a 6-7cm left ovarian cyst with septations on pelvic sono and CT A/P. At her annual GYN visit 2 weeks ago, tumor markers (CA-125, CEA, CA 19-9) were drawn and were wnl. Reports recent prolonged menses, last month, with the passage of clots. This is new for her. Denies fever, abnormal vaginal discharge, and dysuria. She does however experience frequency and urgency. She is not sexually active for many years. She does not have children, but thinks in the future she may want to conceive. \par \par Of note, she has been following with gastroenterology for multiple GI complaints. She reports chronic epigastric pain s/p EGD, changes in her feces (color, odor, bulk), inability to fully empty her bowels, "noisy" gut sounds, abdominal bloating, and weight loss of about ~5lbs since December. She endorses a good appetite, but is not able to eat well due to her epigastric pain. She has a GI appt scheduled next week \par \par Pap/HPV (4/2020) - neg x2\par \par Summary of relevant imaging findings below:\par --TVUS (8/19/20): Uterus 6.9cm. EM 1cm at the fundus. 1.1cm echogenic structure with internal vascularity, suggestive of a polyp. R. ovary wnl.  L. ovary 7.2cm w/ 2 cystic structures - 6cm cyst with thickend septation with internal vascularity; 2cm follicle.\par --CT A/P (8/19/20): Left adnexal cyst 6.4cm, no ascites, no lymphadenopathy.\par \par OBHx - G0\par GYNHx - +ovarian cysts; breast cysts; denies fibroids, abnormal paps, STIs \par PMHx - GERD, fibromyalgia, insomnia, depression \par PSHx - denies \par Meds - pepcid, melatonin, vitamins \par All - NKDA \par SocHx - denies tobacco/drug/alcohol use \par FHX - h/o reproductive-related cancer, but not ovarian cancer (paternal grandmother)\par

## 2020-09-18 NOTE — PHYSICAL EXAM
[Appropriately responsive] : appropriately responsive [Alert] : alert [No Acute Distress] : no acute distress [No Murmurs] : no murmurs [Soft] : soft [Non-distended] : non-distended [No Mass] : no mass [Oriented x3] : oriented x3 [Normal Position] : in a normal position [Uterine Adnexae] : non-palpable [Adnexa Tenderness On The Left] : tender [___] : [unfilled] cm mass on the left [FreeTextEntry7] : Tender in LLQ with voluntary guarding, no rebound

## 2020-09-18 NOTE — REVIEW OF SYSTEMS
[Abdominal Pain] : abdominal pain [Diarrhea] : diarrhea [Heartburn] : heartburn [Bloating] : bloating [Urgency] : urgency [Frequency] : frequency [Abn Vaginal bleeding] : abnormal vaginal bleeding [Pelvic pain] : pelvic pain [Negative] : Heme/Lymph [Dysuria] : no dysuria

## 2020-09-22 ENCOUNTER — APPOINTMENT (OUTPATIENT)
Dept: SURGICAL ONCOLOGY | Facility: CLINIC | Age: 45
End: 2020-09-22
Payer: COMMERCIAL

## 2020-09-22 ENCOUNTER — APPOINTMENT (OUTPATIENT)
Dept: GASTROENTEROLOGY | Facility: HOSPITAL | Age: 45
End: 2020-09-22
Payer: COMMERCIAL

## 2020-09-22 ENCOUNTER — OUTPATIENT (OUTPATIENT)
Dept: OUTPATIENT SERVICES | Facility: HOSPITAL | Age: 45
LOS: 1 days | End: 2020-09-22
Payer: SELF-PAY

## 2020-09-22 VITALS
RESPIRATION RATE: 16 BRPM | DIASTOLIC BLOOD PRESSURE: 87 MMHG | HEIGHT: 59 IN | TEMPERATURE: 98.2 F | HEART RATE: 83 BPM | BODY MASS INDEX: 20.02 KG/M2 | WEIGHT: 99.31 LBS | SYSTOLIC BLOOD PRESSURE: 152 MMHG

## 2020-09-22 VITALS
SYSTOLIC BLOOD PRESSURE: 140 MMHG | OXYGEN SATURATION: 98 % | WEIGHT: 98 LBS | BODY MASS INDEX: 19.76 KG/M2 | HEART RATE: 95 BPM | HEIGHT: 59 IN | DIASTOLIC BLOOD PRESSURE: 80 MMHG

## 2020-09-22 DIAGNOSIS — R10.9 UNSPECIFIED ABDOMINAL PAIN: ICD-10-CM

## 2020-09-22 DIAGNOSIS — R10.814 LEFT LOWER QUADRANT ABDOMINAL TENDERNESS: ICD-10-CM

## 2020-09-22 DIAGNOSIS — Z98.890 OTHER SPECIFIED POSTPROCEDURAL STATES: Chronic | ICD-10-CM

## 2020-09-22 DIAGNOSIS — I10 ESSENTIAL (PRIMARY) HYPERTENSION: ICD-10-CM

## 2020-09-22 DIAGNOSIS — R10.32 LEFT LOWER QUADRANT PAIN: ICD-10-CM

## 2020-09-22 PROCEDURE — 99214 OFFICE O/P EST MOD 30 MIN: CPT | Mod: GC

## 2020-09-22 PROCEDURE — G0463: CPT

## 2020-09-22 PROCEDURE — 99214 OFFICE O/P EST MOD 30 MIN: CPT

## 2020-09-22 NOTE — HISTORY OF PRESENT ILLNESS
[de-identified] : Ms. Arce is a 43 year old female returns for follow up breast exam.  She denies any palpable masses.No nipple discharge. However she reports worsening of her myalgias, abdominal bloating, mild abd pain, lower back pain. Seen by GI and being worked up.\par She is in my office today after her MRI breast- no suspicious masses. Denies breast masses, nipple discharge, complains of breast pain bilaterally, worse around her menses.\par She was seen for an initial consultation (referred by [facility]) 2019 due to a finding on her bilateral breast ultrasound dated 2019 as follows:\par \par Right Breast:\par -  9:00 axis, 4cm from the nipple, simple cyst measuring 2.9 x 1.1 x 2.1 cm - previously measuring 2.4 x 1.0 x 2.2 cm, correlating to an area of palpable concern.  \par -  Multiple additional simple cysts and cyst clusters.  \par \par Left Breast:\par -  Multiple simple cysts measuring up to 3.8 x 1.7 x 3.8 cm at the 10:00 axis, retroareolar region\par -  2:00 axis, 4 cm from the nipple, subcentimeter cysts measuring 0.7 x 0.4 x 0.7 cm at area of reported pain.\par -  2:00 axis, 3-4 cm from the nipple, unchanged hypoechoic mass measuring 0.6 x 0.3 x 0.4 cm.  \par -  Multiple additional simple cysts.\par \par IMPRESSION:  \par -  No sonographic evidence of malignancy\par -  Right breast palpable area of concern correlates to an enlarging cyst\par -  Left breast pain without suspicious sonographic findings.  In the absence of objective imaging findings, management of left breast pain should be determined clinically.  \par BIRADS-2\par \par Ms. Arce indicated the right breast mass is palpable is painful and symptoms will be exacerbated prior to her menstrual cycle.  She denied nipple discharge or skin changes.  Denies any history of previous breast biopsies or personal history of cancer.  She underwent a bilateral breast cyst aspiration on 19 with benign findings.  She then underwent a right breast US guided core biopsy of right breast 9:00 mass with a final path of PASH.  \par \par Today she feels that her right breast tenderness has decreased since undergoing her aspiration.  She is without any complaint and continues to deny nipple inversion skin changes, or breast pain. Denies constitutional symptoms.\par  \par \par PMH:\par -  fibromyalgia\par -  \par \par PSH:\par -  dental surgery 2019 complicated by infection and treated with abx.\par \par Family History:\par -  Paternal Aunt - Bone Cancer - age 72\par -  Throat Cancer - Maternal Uncle - age 60s\par -  Paternal Grandmother - endometrial cancer -  age 64\par -  Paternal 1st cousin - brain cancer - age 41\par \par \par

## 2020-09-22 NOTE — PHYSICAL EXAM
[General Appearance - Alert] : alert [General Appearance - Well-Appearing] : healthy appearing [Sclera] : the sclera and conjunctiva were normal [Outer Ear] : the ears and nose were normal in appearance [Neck Appearance] : the appearance of the neck was normal [] : no respiratory distress [Exaggerated Use Of Accessory Muscles For Inspiration] : no accessory muscle use [Heart Rate And Rhythm] : heart rate was normal and rhythm regular [Edema] : there was no peripheral edema [Bowel Sounds] : normal bowel sounds [Abdomen Soft] : soft [Abdomen Tenderness] : non-tender [Abnormal Walk] : normal gait [No Focal Deficits] : no focal deficits [Oriented To Time, Place, And Person] : oriented to person, place, and time

## 2020-09-22 NOTE — REVIEW OF SYSTEMS
[Recent Weight Loss (___ Lbs)] : recent [unfilled] ~Ulb weight loss [As Noted in HPI] : as noted in HPI [Fever] : no fever [Chills] : no chills [Eye Pain] : no eye pain [Red Eyes] : eyes not red [Nosebleeds] : no nosebleeds [Sore Throat] : no sore throat [Chest Pain] : no chest pain [Palpitations] : no palpitations [Shortness Of Breath] : no shortness of breath [Cough] : no cough [Dysuria] : no dysuria [Skin Wound] : no skin wound [Dizziness] : no dizziness [Fainting] : no fainting [FreeTextEntry9] : +muscle pains

## 2020-09-22 NOTE — ASSESSMENT
[FreeTextEntry1] : 43 Y F with symptomatic bilateral breasts right > left.  B/L breast cyst aspiration June 2019 with benign findings.  Right breast US guided core biopsy 9:00 with [facility]\"". Last MRI breast- 2/20- NO suspicious masses\par \par Plan:\par Will resume annual mammography- Feb 2021\par Would defer work up per GI for her abdominal complaints\par RTO March 2021.\par I have discussed the diagnosis, therapeutic plan and options with the patient at length. Patient expressed verbal understanding to proceed with proposed plan. All questions answered.\par

## 2020-09-22 NOTE — PHYSICAL EXAM
[Normal] : supple, no neck mass and thyroid not enlarged [Normal Supraclavicular Lymph Nodes] : normal supraclavicular lymph nodes [Normal Groin Lymph Nodes] : normal groin lymph nodes [Normal Axillary Lymph Nodes] : normal axillary lymph nodes [Normal] : oriented to person, place and time, with appropriate affect [de-identified] : Bilaterl breasts are nodular. No palpable mass in either breast, bilateral nipple/areolar complex- insignificant, no palpable lymphadenopathy in bilateral axilla and/or neck. A certified chaperone present during my exam at all times.

## 2020-09-22 NOTE — HISTORY OF PRESENT ILLNESS
[___ Month(s) Ago] : [unfilled] month(s) ago [de-identified] : 45 F w/ Hx H pylori s/p triple therapy, lactose intolerance, constipation, fibromyalgia presents for follow up of continued symptoms since last visit which include: epigastric burning abdominal pain, weight loss (total 7lbs since July), sticky stools, globus sensation without dysphagia/odynophagia and left sided periumbilical pain. She reports she has been eating less because of the burning pain, constant however occasionally improves but does not go away, does not radiate. No affected by food intake or body position. Denies melena or hematochezia, NSAID use. She tried 2 doses of famotidine and stopped because it did not help.  \par \par Also continues to have foul smelling sticky stools occasionally. Sometimes will be hard and solid, sometimes softer and sticky. Has 1BM every day normally hwoever does not feel fully evacuated.  Denies f/c /n/v. \par \par Since last visit had +stool studies with EPEC (no longer having diarrhea). S/P EGD 9/2 with chronic mild inactive gastritis, neg H pylori, neg esophageal bx. She followed up with Gyn and had repeat TVUS which showed new left ovarian cyst 6cm with septation.

## 2020-09-22 NOTE — ASSESSMENT
[FreeTextEntry1] : 45F with history of Hpylori s/p triple therapy, lactose intolerance, constipation, fibromyalgia, newly diagnosed 6cm ovarian cyst presenting for continued symptoms of epigastric pain, early satiety, weight loss and intermittent sticky, foul smelling stool. 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, labs in 8/2020 with neg lipase, also constant in nature so less likely pancreatitis (GB and pancreas normal on CT 8/2020)\par #Early satiety, weight loss: EGD as above, also in setting of decreased PO intake 2/2 epigastric burning\par \par \par Plan:\par - plan for colonoscopy for colon cancer screening, advised patient that this is the priority currently given concerning symptoms and need to Gyn intervention \par - risks and benefits discussed with patient\par - golytely ordered for prep\par - famotidine BID - adherence was discussed with patient to assess efficacy, if no improvement in next 2 weeks will advance to trial of PPI\par - no further eval/tx needed for +EPEC as patient is not having diarrhea, fevers, chills at this time\par - after colonoscopy, will consider breath test previously ordered for bloating for SIBO\par \par \par d/w Dr Sanchez

## 2020-09-25 DIAGNOSIS — Z12.11 ENCOUNTER FOR SCREENING FOR MALIGNANT NEOPLASM OF COLON: ICD-10-CM

## 2020-09-25 DIAGNOSIS — R10.13 EPIGASTRIC PAIN: ICD-10-CM

## 2020-10-05 ENCOUNTER — APPOINTMENT (OUTPATIENT)
Dept: DISASTER EMERGENCY | Facility: CLINIC | Age: 45
End: 2020-10-05

## 2020-10-06 LAB — SARS-COV-2 N GENE NPH QL NAA+PROBE: NOT DETECTED

## 2020-10-07 ENCOUNTER — OUTPATIENT (OUTPATIENT)
Dept: OUTPATIENT SERVICES | Facility: HOSPITAL | Age: 45
LOS: 1 days | End: 2020-10-07
Payer: SELF-PAY

## 2020-10-07 ENCOUNTER — RESULT REVIEW (OUTPATIENT)
Age: 45
End: 2020-10-07

## 2020-10-07 VITALS
RESPIRATION RATE: 20 BRPM | HEIGHT: 59 IN | SYSTOLIC BLOOD PRESSURE: 136 MMHG | HEART RATE: 83 BPM | OXYGEN SATURATION: 99 % | TEMPERATURE: 98 F | WEIGHT: 95.02 LBS | DIASTOLIC BLOOD PRESSURE: 83 MMHG

## 2020-10-07 VITALS
DIASTOLIC BLOOD PRESSURE: 86 MMHG | HEART RATE: 54 BPM | RESPIRATION RATE: 18 BRPM | SYSTOLIC BLOOD PRESSURE: 141 MMHG | OXYGEN SATURATION: 100 %

## 2020-10-07 DIAGNOSIS — Z98.890 OTHER SPECIFIED POSTPROCEDURAL STATES: Chronic | ICD-10-CM

## 2020-10-07 DIAGNOSIS — Z12.11 ENCOUNTER FOR SCREENING FOR MALIGNANT NEOPLASM OF COLON: ICD-10-CM

## 2020-10-07 DIAGNOSIS — R10.9 UNSPECIFIED ABDOMINAL PAIN: ICD-10-CM

## 2020-10-07 PROCEDURE — 88305 TISSUE EXAM BY PATHOLOGIST: CPT

## 2020-10-07 PROCEDURE — 45380 COLONOSCOPY AND BIOPSY: CPT | Mod: GC

## 2020-10-07 PROCEDURE — 45380 COLONOSCOPY AND BIOPSY: CPT

## 2020-10-07 PROCEDURE — 88305 TISSUE EXAM BY PATHOLOGIST: CPT | Mod: 26

## 2020-10-07 RX ORDER — SODIUM CHLORIDE 9 MG/ML
1000 INJECTION INTRAMUSCULAR; INTRAVENOUS; SUBCUTANEOUS
Refills: 0 | Status: DISCONTINUED | OUTPATIENT
Start: 2020-10-07 | End: 2020-10-22

## 2020-10-07 RX ADMIN — SODIUM CHLORIDE 30 MILLILITER(S): 9 INJECTION INTRAMUSCULAR; INTRAVENOUS; SUBCUTANEOUS at 13:26

## 2020-10-07 NOTE — ASU DISCHARGE PLAN (ADULT/PEDIATRIC) - NSDISCH_IV_ENDO_ALL_CORE_FT
PA Initiation    Medication: flonase  Insurance Company: [employer] - Phone [phone] Fax [phone]  Pharmacy Filling the Rx: [facility] #[id number] - [place] [id number] [address]  Filling Pharmacy Phone: [phone]  Filling Pharmacy Fax:    Start Date: 4/21/2018    Central Prior Authorization Team   Phone: [phone]           If your Intravenous (IV) site becomes red, swollen or painful, call your doctor.

## 2020-10-07 NOTE — PRE-ANESTHESIA EVALUATION ADULT - NSANTHOSAYNRD_GEN_A_CORE
No. TANIKA screening performed.  STOP BANG Legend: 0-2 = LOW Risk; 3-4 = INTERMEDIATE Risk; 5-8 = HIGH Risk

## 2020-10-08 ENCOUNTER — APPOINTMENT (OUTPATIENT)
Dept: OBGYN | Facility: CLINIC | Age: 45
End: 2020-10-08

## 2020-10-09 ENCOUNTER — LABORATORY RESULT (OUTPATIENT)
Age: 45
End: 2020-10-09

## 2020-10-09 ENCOUNTER — RESULT CHARGE (OUTPATIENT)
Age: 45
End: 2020-10-09

## 2020-10-09 ENCOUNTER — APPOINTMENT (OUTPATIENT)
Dept: OBGYN | Facility: CLINIC | Age: 45
End: 2020-10-09
Payer: COMMERCIAL

## 2020-10-09 ENCOUNTER — OUTPATIENT (OUTPATIENT)
Dept: OUTPATIENT SERVICES | Facility: HOSPITAL | Age: 45
LOS: 1 days | End: 2020-10-09
Payer: SELF-PAY

## 2020-10-09 VITALS — WEIGHT: 99 LBS | BODY MASS INDEX: 20 KG/M2 | SYSTOLIC BLOOD PRESSURE: 130 MMHG | DIASTOLIC BLOOD PRESSURE: 90 MMHG

## 2020-10-09 DIAGNOSIS — N92.6 IRREGULAR MENSTRUATION, UNSPECIFIED: ICD-10-CM

## 2020-10-09 DIAGNOSIS — Z98.890 OTHER SPECIFIED POSTPROCEDURAL STATES: Chronic | ICD-10-CM

## 2020-10-09 DIAGNOSIS — N76.0 ACUTE VAGINITIS: ICD-10-CM

## 2020-10-09 LAB
HCG UR QL: NEGATIVE
QUALITY CONTROL: YES

## 2020-10-09 PROCEDURE — G0463: CPT

## 2020-10-09 PROCEDURE — 99214 OFFICE O/P EST MOD 30 MIN: CPT | Mod: NC,25

## 2020-10-09 PROCEDURE — 58100 BIOPSY OF UTERUS LINING: CPT

## 2020-10-09 PROCEDURE — 58100 BIOPSY OF UTERUS LINING: CPT | Mod: NC

## 2020-10-10 NOTE — PROCEDURE
[Endometrial Biopsy] : Endometrial biopsy [Irregular Bleeding] : irregular uterine bleeding [Risks] : risks [Benefits] : benefits [Alternatives] : alternatives [Patient] : patient [Infection] : infection [Bleeding] : bleeding [Allergic Reaction] : allergic reaction [Uterine Perforation] : uterine perforation [Pain] : pain [CONSENT OBTAINED] : written consent was obtained prior to the procedure. [Betadine] : Betadine [Tenaculum] : a single toothed tenaculum [Easy Passage] : allowed easy passage of a uterine sound without dilation [Pipelle] : a Pipelle endometrial suction curette [Scant] : a scant [Sent to Histology] : the specimen was place in buffered formalin and sent for pathlogy [Tolerated Poorly] : the patient tolerated the procedure poorly [No Complications] : there were no complications [de-identified] : abnormal CT

## 2020-10-10 NOTE — CHIEF COMPLAINT
[Follow Up] : follow up GYN visit [FreeTextEntry1] : pt is s/p surgical consultation, here today for EM biopsy

## 2020-10-13 DIAGNOSIS — R93.5 ABNORMAL FINDINGS ON DIAGNOSTIC IMAGING OF OTHER ABDOMINAL REGIONS, INCLUDING RETROPERITONEUM: ICD-10-CM

## 2020-10-13 DIAGNOSIS — N92.6 IRREGULAR MENSTRUATION, UNSPECIFIED: ICD-10-CM

## 2020-10-13 DIAGNOSIS — N84.0 POLYP OF CORPUS UTERI: ICD-10-CM

## 2020-11-05 ENCOUNTER — OUTPATIENT (OUTPATIENT)
Dept: OUTPATIENT SERVICES | Facility: HOSPITAL | Age: 45
LOS: 1 days | End: 2020-11-05
Payer: SELF-PAY

## 2020-11-05 ENCOUNTER — RESULT CHARGE (OUTPATIENT)
Age: 45
End: 2020-11-05

## 2020-11-05 ENCOUNTER — APPOINTMENT (OUTPATIENT)
Dept: OBGYN | Facility: CLINIC | Age: 45
End: 2020-11-05
Payer: COMMERCIAL

## 2020-11-05 VITALS — TEMPERATURE: 97.5 F

## 2020-11-05 DIAGNOSIS — Z98.890 OTHER SPECIFIED POSTPROCEDURAL STATES: Chronic | ICD-10-CM

## 2020-11-05 DIAGNOSIS — N76.0 ACUTE VAGINITIS: ICD-10-CM

## 2020-11-05 PROCEDURE — G0463: CPT

## 2020-11-05 PROCEDURE — 99213 OFFICE O/P EST LOW 20 MIN: CPT | Mod: GE

## 2020-11-05 NOTE — PHYSICAL EXAM
[Appropriately responsive] : appropriately responsive [Alert] : alert [Labia Majora] : normal [Cervical Stenosis] : cervical stenosis [Normal] : normal [___] : [unfilled] cm mass on the left

## 2020-11-06 NOTE — DISCUSSION/SUMMARY
[FreeTextEntry1] : 46y/o G0, h/o worsening chronic LLQ pain, presents as a consult for a left complex cyst recently seen on imaging. US and CT scan from August indicates the presence of a 6-7cm cyst with a septation. Tumor markers wnl. Unable to obtain adequate biopsy for possible endometrial polyp\par \par #Left Ovarian Cyst\par - Task sent to book got lsc LSO in main OR w/ Oncology back up \par - Patient counseled on surgery for D&C Hysteroscopy, Laparoscopic Oopherectomy and BSO (bilateral for purpose of decreasing risk of ovarian cancer, however patient does not want that despite not wanting fertility.) Patient undertands that the left ovary would be sent for frozen and if malignant she would undergo SARAH BSO possible staging, however if it is not malignant she would like to keep her right tube. Patient voices understanding\par - Patient counseled that removal of her left ovary likely may not fix abdominal pain/back pain/leg pain. Patient voices understanding.\par \par NIESHA Salmon PGY3\par seen w/ Dr. Ricks d/w Dr. Orta \par

## 2020-11-06 NOTE — PROCEDURE
[Endometrial Biopsy] : Endometrial biopsy [Irregular Bleeding] : irregular uterine bleeding [Risks] : risks [Benefits] : benefits [Alternatives] : alternatives [Patient] : patient [No Premedication] : No premedication [None] : none [Tenaculum] : Tenaculum [de-identified] : unable to pass [de-identified] : aborted procedure

## 2020-11-06 NOTE — HISTORY OF PRESENT ILLNESS
[FreeTextEntry1] : 44 y/o G0 LMP 10/7 presents for second consultation for left complex cyst seen on sonogram in August. Patient reports at least a year of LLQ pain. Patient had visited the emergency room 8/19/20 for this pain and was found to have left ovarian mass measuring 7.2x4.6x6.2, 2 cystic structures larger 6 cm smaller cyst dominant follicle measuring 2cm larger thickened septation w/ internal vascularity. Tumor markers sent (Ca 125, CEA and Ca 19-9) were wnl. Patient was recently evaluated by GI and had a normal colonoscopy. She also had an endometrial biopsy at clinic which was lacking endometrial tissue, only endocervical tissue noted. She is still complaining of pelvic pain, back pain, leg pain, and abdominal pain worse after eating. \par \par Pap/HPV (4/2020) - neg x2\par \par Summary of relevant imaging findings below:\par --TVUS (8/19/20): Uterus 6.9cm. EM 1cm at the fundus. 1.1cm echogenic structure with internal vascularity, suggestive of a polyp. R. ovary wnl. L. ovary 7.2cm w/ 2 cystic structures - 6cm cyst with thickend septation with internal vascularity; 2cm follicle.\par --CT A/P (8/19/20): Left adnexal cyst 6.4cm, no ascites, no lymphadenopathy.\par \par OBHx - G0\par GYNHx - +ovarian cysts; breast cysts; denies fibroids, abnormal paps, STIs \par PMHx - GERD, fibromyalgia, insomnia, depression \par PSHx - denies \par Meds - pepcid, melatonin, vitamins \par All - NKDA \par SocHx - denies tobacco/drug/alcohol use \par FHX - h/o reproductive-related cancer, but not ovarian cancer (paternal grandmother)\par \par  \par

## 2020-11-06 NOTE — END OF VISIT
[] : Resident [FreeTextEntry3] : agree with above assessment, for LSO, refusing R salpingectomy understands that can reduce her chance of ovarian cancer. onc on back up\par \par Rossy Orta MD

## 2020-11-09 ENCOUNTER — APPOINTMENT (OUTPATIENT)
Dept: OBGYN | Facility: CLINIC | Age: 45
End: 2020-11-09

## 2020-11-15 DIAGNOSIS — N83.299 OTHER OVARIAN CYST, UNSPECIFIED SIDE: ICD-10-CM

## 2020-11-16 ENCOUNTER — LABORATORY RESULT (OUTPATIENT)
Age: 45
End: 2020-11-16

## 2020-11-17 ENCOUNTER — LABORATORY RESULT (OUTPATIENT)
Age: 45
End: 2020-11-17

## 2020-11-17 ENCOUNTER — APPOINTMENT (OUTPATIENT)
Dept: INTERNAL MEDICINE | Facility: CLINIC | Age: 45
End: 2020-11-17
Payer: MEDICAID

## 2020-11-17 ENCOUNTER — OUTPATIENT (OUTPATIENT)
Dept: OUTPATIENT SERVICES | Facility: HOSPITAL | Age: 45
LOS: 1 days | End: 2020-11-17
Payer: SELF-PAY

## 2020-11-17 VITALS
DIASTOLIC BLOOD PRESSURE: 70 MMHG | WEIGHT: 96 LBS | SYSTOLIC BLOOD PRESSURE: 128 MMHG | BODY MASS INDEX: 19.35 KG/M2 | HEIGHT: 59 IN

## 2020-11-17 VITALS — OXYGEN SATURATION: 97 % | HEART RATE: 86 BPM

## 2020-11-17 DIAGNOSIS — N83.299 OTHER OVARIAN CYST, UNSPECIFIED SIDE: ICD-10-CM

## 2020-11-17 DIAGNOSIS — N39.46 MIXED INCONTINENCE: ICD-10-CM

## 2020-11-17 DIAGNOSIS — I10 ESSENTIAL (PRIMARY) HYPERTENSION: ICD-10-CM

## 2020-11-17 DIAGNOSIS — Z98.890 OTHER SPECIFIED POSTPROCEDURAL STATES: Chronic | ICD-10-CM

## 2020-11-17 LAB
HCT VFR BLD CALC: 40.6 % — SIGNIFICANT CHANGE UP (ref 34.5–45)
HGB BLD-MCNC: 13.4 G/DL — SIGNIFICANT CHANGE UP (ref 11.5–15.5)
MCHC RBC-ENTMCNC: 30.9 PG — SIGNIFICANT CHANGE UP (ref 27–34)
MCHC RBC-ENTMCNC: 33 GM/DL — SIGNIFICANT CHANGE UP (ref 32–36)
MCV RBC AUTO: 93.8 FL — SIGNIFICANT CHANGE UP (ref 80–100)
PLATELET # BLD AUTO: 305 K/UL — SIGNIFICANT CHANGE UP (ref 150–400)
RBC # BLD: 4.33 M/UL — SIGNIFICANT CHANGE UP (ref 3.8–5.2)
RBC # FLD: 12.2 % — SIGNIFICANT CHANGE UP (ref 10.3–14.5)
WBC # BLD: 6.32 K/UL — SIGNIFICANT CHANGE UP (ref 3.8–10.5)
WBC # FLD AUTO: 6.32 K/UL — SIGNIFICANT CHANGE UP (ref 3.8–10.5)

## 2020-11-17 PROCEDURE — 85027 COMPLETE CBC AUTOMATED: CPT

## 2020-11-17 PROCEDURE — G0463: CPT

## 2020-11-17 PROCEDURE — 80053 COMPREHEN METABOLIC PANEL: CPT

## 2020-11-17 PROCEDURE — 85610 PROTHROMBIN TIME: CPT

## 2020-11-17 PROCEDURE — 99396 PREV VISIT EST AGE 40-64: CPT | Mod: GC

## 2020-11-17 PROCEDURE — 80061 LIPID PANEL: CPT

## 2020-11-17 PROCEDURE — 83036 HEMOGLOBIN GLYCOSYLATED A1C: CPT

## 2020-11-17 PROCEDURE — 84443 ASSAY THYROID STIM HORMONE: CPT

## 2020-11-17 NOTE — ASSESSMENT
[FreeTextEntry1] : 45 year old female pmhx Hpylori s/p triple therapy, lactose intolerance, constipation, fibromyalgia, L complex ovarian cyst who is presenting for CPE. \par \par #L ovarian cyst\par -Patient had visited the emergency room 8/19/20 found to have left ovarian mass measuring 7.2x4.6x6.2, 2 cystic structures larger 6 cm smaller cyst dominant follicle measuring 2cm larger thickened septation w/ internal vascularity\par -Tumor markers (Ca 125, CEA and Ca 19-9) wnl\par -plan for LSO for Dr. Rossy Orta. If pathology shows malignancy plan for SARAH BSO. \par -has persistent crampy LLQ pain. Uses Advil prn. Counseled to avoid NSAIDs given history of gastritis\par -tylenol and lidocaine patch prn for pain\par -RTC 1 week prior to surgery if patient needs cardiac clearance \par \par #GERD\par -Acidic diet with lots of fruits. \par -Non-compliant with pepcid\par -Protonix 40mg qdaily prescription sent\par -counseled to lower acidic food intake\par \par #Urge incontinence \par -would like to wait until after surgery before considering any medications\par -UA, urine cultures sent \par -Urology referral for urodynamics \par \par #Constipation/bloating\par -EGD 9/2/2020 normal esophagus, normal stomach, normal duodenum. Biopsy showing mild inactive gastritis. \par -Colonoscopy 10/7/20 biopsies negative\par -EPEC positive but no further treatment per GI recs since no diarrhea, fevers or chills\par -lactose intolerant yet patient eats yogurt. Counseled to avoid diary \par -PPI as above\par -c/w high fiber diet but substitute some fruits for vegetables and fiber supplements. \par -f/u with GI for possible breath test to r/o SIBO\par \par #Acne\par -facial acne\par -benzoyl peroxide 2.5%\par -dermatology referral \par \par #HCM\par -Update to date with screening\par -refusing flu shot today \par -PHQ-2 score is 2. Denies SI/HI. Offered behavioral health referral but patient declined\par -CBC, CMP, TSH, Lipids, A1c \par

## 2020-11-17 NOTE — PHYSICAL EXAM
[Normal Sclera/Conjunctiva] : normal sclera/conjunctiva [PERRL] : pupils equal round and reactive to light [EOMI] : extraocular movements intact [Normal Outer Ear/Nose] : the outer ears and nose were normal in appearance [Normal Oropharynx] : the oropharynx was normal [No JVD] : no jugular venous distention [No Lymphadenopathy] : no lymphadenopathy [Supple] : supple [No Respiratory Distress] : no respiratory distress  [No Accessory Muscle Use] : no accessory muscle use [Clear to Auscultation] : lungs were clear to auscultation bilaterally [Normal Rate] : normal rate  [Regular Rhythm] : with a regular rhythm [Normal S1, S2] : normal S1 and S2 [No Murmur] : no murmur heard [Soft] : abdomen soft [Non Tender] : non-tender [Non-distended] : non-distended [No Masses] : no abdominal mass palpated [No HSM] : no HSM [Normal Bowel Sounds] : normal bowel sounds [Normal Anterior Cervical Nodes] : no anterior cervical lymphadenopathy [No CVA Tenderness] : no CVA  tenderness [No Joint Swelling] : no joint swelling [Acne] : acne [Alert and Oriented x3] : oriented to person, place, and time [de-identified] : Anxious appearing female [de-identified] : Facial acne

## 2020-11-17 NOTE — HISTORY OF PRESENT ILLNESS
[FreeTextEntry1] : CPE [de-identified] : 45 year old female pmhx Hpylori s/p triple therapy, lactose intolerance, constipation, fibromyalgia, L complex ovarian cyst who is presenting for CPE. \par \par Patient visited ED 8/19/20 LLQ pain and was found to have left ovarian mass. See imaging below. Patient is scheduled for L salpingo-oophorectomy 12/7/20. If intraoperative path is positive for carcinoma then plan for SARAH w BSO. Continues to have persistent LLQ, takes advil prn. \par \par Patient reports acid reflux described as "burning". Recent EGD biopsies showed inactive gastritis. Patient was non-compliant with pepcid, stopping after 2 doses. She takes tums prn that helps. In addition, she reports constipation and bloating. She is lactose intolerant but eats yogurt. Diet is very high in fruits. Avoids caffeine. Occasional alcohol use. \par \par Imaging:\par -TVUS (8/19/20): Uterus 6.9cm. EM 1cm at the fundus. 1.1cm echogenic structure with internal vascularity, suggestive of a polyp. R. ovary wnl. L. ovary 7.2cm w/ 2 cystic structures - 6cm cyst with thickend septation with internal vascularity; 2cm follicle.\par -CT A/P (8/19/20): Left adnexal cyst 6.4cm, no ascites, no lymphadenopathy.\par -EGD 9/2/2020 normal esophagus, normal stomach, normal duodenum. Biopsy showing mild inactive gastritis. \par -Colonoscopy 10/7/20 biopsies negative\par \par Patient also complains of urinary urgency ~ 4 times per day and ~3 times per night. Occasional dysuria. No stress incontinence. \par

## 2020-11-17 NOTE — REVIEW OF SYSTEMS
[Fatigue] : fatigue [Recent Change In Weight] : ~T recent weight change [Abdominal Pain] : abdominal pain [Nausea] : nausea [Constipation] : constipation [Heartburn] : heartburn [Dysuria] : dysuria [Frequency] : frequency [Skin Rash] : skin rash [Fever] : no fever [Chills] : no chills [Hot Flashes] : no hot flashes [Vision Problems] : no vision problems [Chest Pain] : no chest pain [Lower Ext Edema] : no lower extremity edema [Shortness Of Breath] : no shortness of breath [Cough] : no cough [Dyspnea on Exertion] : no dyspnea on exertion [Diarrhea] : diarrhea [Vomiting] : no vomiting [Incontinence] : no incontinence

## 2020-11-18 LAB
A1C WITH ESTIMATED AVERAGE GLUCOSE RESULT: 4.9 % — SIGNIFICANT CHANGE UP (ref 4–5.6)
ALBUMIN SERPL ELPH-MCNC: 4.6 G/DL — SIGNIFICANT CHANGE UP (ref 3.3–5)
ALP SERPL-CCNC: 49 U/L — SIGNIFICANT CHANGE UP (ref 40–120)
ALT FLD-CCNC: 15 U/L — SIGNIFICANT CHANGE UP (ref 10–45)
ANION GAP SERPL CALC-SCNC: 12 MMOL/L — SIGNIFICANT CHANGE UP (ref 5–17)
AST SERPL-CCNC: 14 U/L — SIGNIFICANT CHANGE UP (ref 10–40)
BILIRUB SERPL-MCNC: 0.2 MG/DL — SIGNIFICANT CHANGE UP (ref 0.2–1.2)
BUN SERPL-MCNC: 7 MG/DL — SIGNIFICANT CHANGE UP (ref 7–23)
CALCIUM SERPL-MCNC: 9.3 MG/DL — SIGNIFICANT CHANGE UP (ref 8.4–10.5)
CHLORIDE SERPL-SCNC: 103 MMOL/L — SIGNIFICANT CHANGE UP (ref 96–108)
CHOLEST SERPL-MCNC: 131 MG/DL — SIGNIFICANT CHANGE UP
CO2 SERPL-SCNC: 23 MMOL/L — SIGNIFICANT CHANGE UP (ref 22–31)
CREAT SERPL-MCNC: 0.73 MG/DL — SIGNIFICANT CHANGE UP (ref 0.5–1.3)
ESTIMATED AVERAGE GLUCOSE: 94 MG/DL — SIGNIFICANT CHANGE UP (ref 68–114)
GLUCOSE SERPL-MCNC: 92 MG/DL — SIGNIFICANT CHANGE UP (ref 70–99)
HDLC SERPL-MCNC: 55 MG/DL — SIGNIFICANT CHANGE UP
INR BLD: 1.09 RATIO — SIGNIFICANT CHANGE UP (ref 0.88–1.16)
LIPID PNL WITH DIRECT LDL SERPL: 59 MG/DL — SIGNIFICANT CHANGE UP
NON HDL CHOLESTEROL: 76 MG/DL — SIGNIFICANT CHANGE UP
POTASSIUM SERPL-MCNC: 4.2 MMOL/L — SIGNIFICANT CHANGE UP (ref 3.5–5.3)
POTASSIUM SERPL-SCNC: 4.2 MMOL/L — SIGNIFICANT CHANGE UP (ref 3.5–5.3)
PROT SERPL-MCNC: 6.9 G/DL — SIGNIFICANT CHANGE UP (ref 6–8.3)
PROTHROM AB SERPL-ACNC: 12.8 SEC — SIGNIFICANT CHANGE UP (ref 10.6–13.6)
SODIUM SERPL-SCNC: 138 MMOL/L — SIGNIFICANT CHANGE UP (ref 135–145)
T4 FREE+ TSH PNL SERPL: 1.26 UIU/ML — SIGNIFICANT CHANGE UP (ref 0.27–4.2)
TRIGL SERPL-MCNC: 88 MG/DL — SIGNIFICANT CHANGE UP

## 2020-11-19 DIAGNOSIS — K21.9 GASTRO-ESOPHAGEAL REFLUX DISEASE WITHOUT ESOPHAGITIS: ICD-10-CM

## 2020-11-19 DIAGNOSIS — N39.46 MIXED INCONTINENCE: ICD-10-CM

## 2020-11-19 DIAGNOSIS — L70.9 ACNE, UNSPECIFIED: ICD-10-CM

## 2020-11-19 DIAGNOSIS — Z00.00 ENCOUNTER FOR GENERAL ADULT MEDICAL EXAMINATION WITHOUT ABNORMAL FINDINGS: ICD-10-CM

## 2020-11-23 ENCOUNTER — OUTPATIENT (OUTPATIENT)
Dept: OUTPATIENT SERVICES | Facility: HOSPITAL | Age: 45
LOS: 1 days | End: 2020-11-23
Payer: SELF-PAY

## 2020-11-23 VITALS
SYSTOLIC BLOOD PRESSURE: 128 MMHG | DIASTOLIC BLOOD PRESSURE: 80 MMHG | RESPIRATION RATE: 16 BRPM | OXYGEN SATURATION: 99 % | TEMPERATURE: 99 F | WEIGHT: 98.11 LBS | HEART RATE: 62 BPM

## 2020-11-23 DIAGNOSIS — Z98.890 OTHER SPECIFIED POSTPROCEDURAL STATES: Chronic | ICD-10-CM

## 2020-11-23 DIAGNOSIS — N83.299 OTHER OVARIAN CYST, UNSPECIFIED SIDE: ICD-10-CM

## 2020-11-23 DIAGNOSIS — Z01.818 ENCOUNTER FOR OTHER PREPROCEDURAL EXAMINATION: ICD-10-CM

## 2020-11-23 LAB
BLD GP AB SCN SERPL QL: NEGATIVE — SIGNIFICANT CHANGE UP
RH IG SCN BLD-IMP: POSITIVE — SIGNIFICANT CHANGE UP

## 2020-11-23 PROCEDURE — 86850 RBC ANTIBODY SCREEN: CPT

## 2020-11-23 PROCEDURE — 86901 BLOOD TYPING SEROLOGIC RH(D): CPT

## 2020-11-23 PROCEDURE — G0463: CPT

## 2020-11-23 PROCEDURE — 86900 BLOOD TYPING SEROLOGIC ABO: CPT

## 2020-11-23 RX ORDER — CEFOTETAN DISODIUM 1 G
2 VIAL (EA) INJECTION ONCE
Refills: 0 | Status: DISCONTINUED | OUTPATIENT
Start: 2020-12-07 | End: 2020-12-21

## 2020-11-23 NOTE — H&P PST ADULT - NSICDXPROBLEM_GEN_ALL_CORE_FT
PROBLEM DIAGNOSES  Problem: Complex ovarian cyst  Assessment and Plan: D&C, Diagnostic Hysteroscopy, Laparoscopic Left Salpingo-Oophorectomy, Possible Right Salpinog-Oophorectomy, Possible Total Abdominal Hysteroscoopy, Possible Bilateral Salpingectomy, Possible Stagin on 12/7/20  Pre-op instructions, including Chlorhexidine soap, provided - all questions answered  [facility] DOS

## 2020-11-23 NOTE — H&P PST ADULT - ASSESSMENT
Patient seen at bedside, with complex left adenxal cyst, tumor markers wnl, for laparoscopic Left Salpingo-Oophorectomy, frozen section, possible TAHBSO, nodes, any other indicated procedure. Patient also consented for d&c hysteroscopy, polypectomy. patient seen at bedside, explained risks of procedure including but not limited to VTE, SSI, damage to bowel, bladder, uterine perforation, need for laparotomy, states she is willing to accept a blood transfusion. Discussed post operative care. All questions answered.

## 2020-11-23 NOTE — H&P PST ADULT - NSICDXPASTMEDICALHX_GEN_ALL_CORE_FT
PAST MEDICAL HISTORY:  Fibromyalgia     H pylori ulcer     HPV (human papilloma virus) infection     Insomnia      PAST MEDICAL HISTORY:  Complex ovarian cyst     Fibromyalgia     GERD (gastroesophageal reflux disease)     H pylori ulcer treated    HPV (human papilloma virus) infection     Insomnia

## 2020-11-23 NOTE — H&P PST ADULT - NSICDXFAMILYHX_GEN_ALL_CORE_FT
FAMILY HISTORY:  Grandparent  Still living? Unknown  Family history of cervical cancer, Age at diagnosis: Age Unknown  Family history of uterine cancer, Age at diagnosis: Age Unknown    Aunt  Still living? Unknown  Family history of bone cancer, Age at diagnosis: Age Unknown  Family history of skin cancer, Age at diagnosis: Age Unknown

## 2020-11-23 NOTE — H&P PST ADULT - HISTORY OF PRESENT ILLNESS
46 yo female, PMH fibromyalgia, LMP 20, , reports LLQ pain that comes and goes since January, on 20 came to ER GERD symptoms and abdominal pain, CT scan revealed a left ovarian mass measuring 7.2X4.6X6.2. Pt. continues with intermittent pelvic pain, bloating and abdominal pain, had endoscopy recently which revealed inactive gastritis and takes TUMS as needed. Pt. presents to [facility] for scheduled D&C, diagnostic Hysteroscopy, Laparoscopic Left Salpingo-Oophorectomy, Possible Right Salpingo-Oophorectomy, Possible Total Abdominal Hysteroscopy, Possible Bilateral Salpingectomy, Possible Staging on 20 positive, has lost 7-10lbs.  dry lips, fever for 10 days, lost taste and smell, body aches, no SOB, no cough     Pt. speaks some English, prefers that I communicate in Danish during PST visit  44 yo female, PMH fibromyalgia, LMP 20, , reports LLQ pain that comes and goes since January, on 20 came to ER GERD symptoms and abdominal pain, CT scan revealed a left ovarian mass measuring 7.2X4.6X6.2. Pt. continues with intermittent pelvic pain, bloating, abdominal pain, back and thigh pain. Recent endoscopy revealed inactive gastritis and takes TUMS as needed. Pt. presents to [facility] for scheduled D&C, diagnostic Hysteroscopy, Laparoscopic Left Salpingo-Oophorectomy, Possible Right Salpingo-Oophorectomy, Possible Total Abdominal Hysteroscopy, Possible Bilateral Salpingectomy, Possible Staging on 20. Pt. reports having flu like symptoms on Match 26th, states testing positive for COVID  - symptoms were fever, body aches and temporary loss of taste and smell. Presently denies recent fever, chills, chest pain, SOB, changes in bowel/urinary habits.    Pt. is scheduled for COVID-19 testing on 20 at [facility].    Pt. had lab work 20 wnl  - in [place]  T&S done at [facility]  [facility]

## 2020-11-23 NOTE — H&P PST ADULT - VISION (WITH CORRECTIVE LENSES IF THE PATIENT USUALLY WEARS THEM):
corrective glasses/Partially impaired: cannot see medication labels or newsprint, but can see obstacles in path, and the surrounding layout; can count fingers at arm's length

## 2020-11-23 NOTE — H&P PST ADULT - ATTENDING COMMENTS
Patient seen at bedside, with complex left adenxal cyst, tumor markers wnl, for laparoscopic Left Salpingo-Oophorectomy, frozen section, possible TAHBSO, nodes, any other indicated procedure. Patient also consented for d&c hysteroscopy, polypectomy. patient seen at bedside, explained risks of procedure including but not limited to VTE, SSI, damage to bowel, bladder, uterine perforation, need for laparotomy, states she is willing to accept a blood transfusion. Discussed post operative care. All questions answered. Understands learners are part of her case and will be performing EUA for learning purposes.

## 2020-12-02 PROBLEM — N83.299 OTHER OVARIAN CYST, UNSPECIFIED SIDE: Chronic | Status: ACTIVE | Noted: 2020-11-23

## 2020-12-02 PROBLEM — K21.9 GASTRO-ESOPHAGEAL REFLUX DISEASE WITHOUT ESOPHAGITIS: Chronic | Status: ACTIVE | Noted: 2020-11-23

## 2020-12-04 ENCOUNTER — OUTPATIENT (OUTPATIENT)
Dept: OUTPATIENT SERVICES | Facility: HOSPITAL | Age: 45
LOS: 1 days | End: 2020-12-04
Payer: SELF-PAY

## 2020-12-04 DIAGNOSIS — Z11.59 ENCOUNTER FOR SCREENING FOR OTHER VIRAL DISEASES: ICD-10-CM

## 2020-12-04 DIAGNOSIS — Z98.890 OTHER SPECIFIED POSTPROCEDURAL STATES: Chronic | ICD-10-CM

## 2020-12-04 LAB — SARS-COV-2 RNA SPEC QL NAA+PROBE: SIGNIFICANT CHANGE UP

## 2020-12-04 PROCEDURE — U0003: CPT

## 2020-12-06 ENCOUNTER — TRANSCRIPTION ENCOUNTER (OUTPATIENT)
Age: 45
End: 2020-12-06

## 2020-12-07 ENCOUNTER — RESULT REVIEW (OUTPATIENT)
Age: 45
End: 2020-12-07

## 2020-12-07 ENCOUNTER — APPOINTMENT (OUTPATIENT)
Dept: OBGYN | Facility: HOSPITAL | Age: 45
End: 2020-12-07

## 2020-12-07 ENCOUNTER — OUTPATIENT (OUTPATIENT)
Dept: OUTPATIENT SERVICES | Facility: HOSPITAL | Age: 45
LOS: 1 days | End: 2020-12-07
Payer: SELF-PAY

## 2020-12-07 VITALS
WEIGHT: 98.11 LBS | RESPIRATION RATE: 18 BRPM | DIASTOLIC BLOOD PRESSURE: 66 MMHG | OXYGEN SATURATION: 99 % | HEIGHT: 60 IN | SYSTOLIC BLOOD PRESSURE: 109 MMHG | TEMPERATURE: 99 F | HEART RATE: 64 BPM

## 2020-12-07 VITALS
OXYGEN SATURATION: 100 % | RESPIRATION RATE: 16 BRPM | HEART RATE: 77 BPM | DIASTOLIC BLOOD PRESSURE: 56 MMHG | SYSTOLIC BLOOD PRESSURE: 101 MMHG

## 2020-12-07 DIAGNOSIS — N83.299 OTHER OVARIAN CYST, UNSPECIFIED SIDE: ICD-10-CM

## 2020-12-07 DIAGNOSIS — Z98.890 OTHER SPECIFIED POSTPROCEDURAL STATES: Chronic | ICD-10-CM

## 2020-12-07 DIAGNOSIS — Z01.818 ENCOUNTER FOR OTHER PREPROCEDURAL EXAMINATION: ICD-10-CM

## 2020-12-07 LAB
GLUCOSE BLDC GLUCOMTR-MCNC: 81 MG/DL — SIGNIFICANT CHANGE UP (ref 70–99)
HCG UR QL: NEGATIVE — SIGNIFICANT CHANGE UP
RH IG SCN BLD-IMP: POSITIVE — SIGNIFICANT CHANGE UP

## 2020-12-07 PROCEDURE — 58558 HYSTEROSCOPY BIOPSY: CPT

## 2020-12-07 PROCEDURE — 88331 PATH CONSLTJ SURG 1 BLK 1SPC: CPT

## 2020-12-07 PROCEDURE — 88307 TISSUE EXAM BY PATHOLOGIST: CPT

## 2020-12-07 PROCEDURE — C9399: CPT

## 2020-12-07 PROCEDURE — 88307 TISSUE EXAM BY PATHOLOGIST: CPT | Mod: 26

## 2020-12-07 PROCEDURE — 88305 TISSUE EXAM BY PATHOLOGIST: CPT

## 2020-12-07 PROCEDURE — 58661 LAPAROSCOPY REMOVE ADNEXA: CPT

## 2020-12-07 PROCEDURE — 88305 TISSUE EXAM BY PATHOLOGIST: CPT | Mod: 26

## 2020-12-07 PROCEDURE — 82962 GLUCOSE BLOOD TEST: CPT

## 2020-12-07 PROCEDURE — 81025 URINE PREGNANCY TEST: CPT

## 2020-12-07 PROCEDURE — 88331 PATH CONSLTJ SURG 1 BLK 1SPC: CPT | Mod: 26

## 2020-12-07 RX ORDER — SODIUM CHLORIDE 9 MG/ML
1000 INJECTION, SOLUTION INTRAVENOUS
Refills: 0 | Status: DISCONTINUED | OUTPATIENT
Start: 2020-12-07 | End: 2020-12-21

## 2020-12-07 RX ORDER — SODIUM CHLORIDE 9 MG/ML
3 INJECTION INTRAMUSCULAR; INTRAVENOUS; SUBCUTANEOUS EVERY 8 HOURS
Refills: 0 | Status: DISCONTINUED | OUTPATIENT
Start: 2020-12-07 | End: 2020-12-07

## 2020-12-07 RX ORDER — ACETAMINOPHEN 500 MG
975 TABLET ORAL ONCE
Refills: 0 | Status: COMPLETED | OUTPATIENT
Start: 2020-12-07 | End: 2020-12-07

## 2020-12-07 RX ORDER — CHLORHEXIDINE GLUCONATE 213 G/1000ML
1 SOLUTION TOPICAL ONCE
Refills: 0 | Status: DISCONTINUED | OUTPATIENT
Start: 2020-12-07 | End: 2020-12-07

## 2020-12-07 RX ORDER — CELECOXIB 200 MG/1
200 CAPSULE ORAL ONCE
Refills: 0 | Status: COMPLETED | OUTPATIENT
Start: 2020-12-07 | End: 2020-12-07

## 2020-12-07 RX ORDER — OXYCODONE HYDROCHLORIDE 5 MG/1
5 TABLET ORAL ONCE
Refills: 0 | Status: DISCONTINUED | OUTPATIENT
Start: 2020-12-07 | End: 2020-12-07

## 2020-12-07 RX ORDER — LIDOCAINE HCL 20 MG/ML
0.2 VIAL (ML) INJECTION ONCE
Refills: 0 | Status: DISCONTINUED | OUTPATIENT
Start: 2020-12-07 | End: 2020-12-07

## 2020-12-07 RX ORDER — HYDROMORPHONE HYDROCHLORIDE 2 MG/ML
0.5 INJECTION INTRAMUSCULAR; INTRAVENOUS; SUBCUTANEOUS
Refills: 0 | Status: DISCONTINUED | OUTPATIENT
Start: 2020-12-07 | End: 2020-12-07

## 2020-12-07 RX ORDER — OXYCODONE HYDROCHLORIDE 5 MG/1
1 TABLET ORAL
Qty: 5 | Refills: 0
Start: 2020-12-07 | End: 2020-12-07

## 2020-12-07 RX ORDER — IBUPROFEN 200 MG
600 TABLET ORAL ONCE
Refills: 0 | Status: DISCONTINUED | OUTPATIENT
Start: 2020-12-07 | End: 2020-12-21

## 2020-12-07 RX ORDER — FAMOTIDINE 10 MG/ML
20 INJECTION INTRAVENOUS ONCE
Refills: 0 | Status: COMPLETED | OUTPATIENT
Start: 2020-12-07 | End: 2020-12-07

## 2020-12-07 RX ORDER — ONDANSETRON 8 MG/1
4 TABLET, FILM COATED ORAL ONCE
Refills: 0 | Status: COMPLETED | OUTPATIENT
Start: 2020-12-07 | End: 2020-12-07

## 2020-12-07 RX ADMIN — ONDANSETRON 4 MILLIGRAM(S): 8 TABLET, FILM COATED ORAL at 10:30

## 2020-12-07 RX ADMIN — FAMOTIDINE 20 MILLIGRAM(S): 10 INJECTION INTRAVENOUS at 06:40

## 2020-12-07 RX ADMIN — CELECOXIB 200 MILLIGRAM(S): 200 CAPSULE ORAL at 06:41

## 2020-12-07 RX ADMIN — HYDROMORPHONE HYDROCHLORIDE 0.5 MILLIGRAM(S): 2 INJECTION INTRAMUSCULAR; INTRAVENOUS; SUBCUTANEOUS at 10:30

## 2020-12-07 NOTE — BRIEF OPERATIVE NOTE - NSICDXBRIEFPROCEDURE_GEN_ALL_CORE_FT
PROCEDURES:  Polypectomy, uterus, hysteroscopic 07-Dec-2020 09:46:39  Jose Garcia  Laparoscopic salpingo-oophorectomy, left 07-Dec-2020 09:44:57  Jose Garcia

## 2020-12-07 NOTE — BRIEF OPERATIVE NOTE - NSICDXBRIEFPREOP_GEN_ALL_CORE_FT
PRE-OP DIAGNOSIS:  Endometrial polyp 07-Dec-2020 09:46:56  Jose Garcia  Adnexal mass 07-Dec-2020 09:45:09  Jose Gracia

## 2020-12-07 NOTE — PRE-ANESTHESIA EVALUATION ADULT - NSANTHADDINFOFT_GEN_ALL_CORE
Above discussed with patient via Croatian interpretor. Patient understands and accepts plan. All questions answered.

## 2020-12-07 NOTE — PROGRESS NOTE ADULT - SUBJECTIVE AND OBJECTIVE BOX
POST-OP CHECK  PA Note    Allergies    latex (Rash (Mild to Mod); Urticaria (Mild to Mod))  No Known Drug Allergies      S:   Pt sleeping, easily arousable  Pain controlled with dilaudid.  Pt c/o nausea which she just received zofran.  Pt denies vomting, SOB, CP, or palpitations. Denies passing Flatus.  Pt still currently NPO.  Due to void by 5p    O:   T(C): 36.2 (12-07-20 @ 09:30), Max: 37.2 (12-07-20 @ 08:49)  HR: 85 (12-07-20 @ 11:00) (64 - 91)  BP: 95/53 (12-07-20 @ 11:00) (95/53 - 110/59)  RR: 16 (12-07-20 @ 11:00) (16 - 18)  SpO2: 99% (12-07-20 @ 11:00) (98% - 100%)  I&O's Summary                       OR           PACU  (I)             1500               IVF LR@125  (O)           150                  Due to void by 5p  EBL            5    CV: RRR  Lungs: CTA B/L  Abd: hypoactive BS, soft, appropriately tender  Inc:  1 small incision site at umbilicus covered with clean bandage  Ext: SCDs in place, Neg Homans B/L    A/P: 45y Female S/P Laparoscopic left salpingoopherectomy, D & C/hysteroscopy with polypectomy   with  PAST MEDICAL & SURGICAL HISTORY:  GERD (gastroesophageal reflux disease)  Complex ovarian cyst  HPV (human papilloma virus) infection  Fibromyalgia  Insomnia  H pylori ulcer  treated  S/P endoscopy  9/2020  H/O oral surgery      -Neuro - AAO x 3, Pain well controlled  -Heme - clinically hemodynamically stable  -CV - asymptomatic  -Pulm - encourage IS, O2sat 99%  -GI - Diet-  Regular  as Tolerated  - -  Due to void by 5p  -DVT prophylaxis - SCDs in place, encourage ambulation  -Meds:  cefoTEtan  IVPB 2 Gram(s) IV Intermittent once  HYDROmorphone  Injectable 0.5 milliGRAM(s) IV Push every 10 minutes PRN    -Dispo: stable for discharge from PACU, once meeting all PACU milestones and voiding without difficulty    Fanny Cameron PA-C

## 2020-12-07 NOTE — ASU DISCHARGE PLAN (ADULT/PEDIATRIC) - CALL YOUR DOCTOR IF YOU HAVE ANY OF THE FOLLOWING:
Excessive diarrhea/Wound/Surgical Site with redness, or foul smelling discharge or pus/Numbness, tingling, color or temperature change to extremity/Pain not relieved by Medications/Fever greater than (need to indicate Fahrenheit or Celsius)/Swelling that gets worse/Nausea and vomiting that does not stop/Unable to urinate/Inability to tolerate liquids or foods/Bleeding that does not stop/Increased irritability or sluggishness

## 2020-12-07 NOTE — PACU DISCHARGE NOTE - MENTAL STATUS: PATIENT PARTICIPATION
SUBJECTIVE:  Nurses note reviewed and accepted.    Chief Complaint   Patient presents with   • Follow-up     Diabetic follow up         Ata Posadas is a 61 year old male who presents today for follow-up of his Type 2 Diabetes Mellitus.  He continues to take the medications listed without adverse reactions or side effects. He is on Metformin 500 mg in the morning and 1000 mg in the evening and Januvia 100 mg daily. His glycohemoglobin on 2/7/2020 was 6.2% indicating excellent control of his diabetes.  He denies hypoglycemic episodes.     He sleeps 7-8 hours most nights.     The patient has lower back pain from time to time. He states it is better than in the past, but it still bothers him from time to time. He reports a few episodes of acute back pain such as when bending over and the pain brought him to his knees.      He had a melanoma removed from his scalp in August 2018 and has not noticed any new lesions.      The most recent lab work was reviewed with the patient.     Hemoglobin A1C (%)   Date Value   02/07/2020 6.2 (H)   08/12/2019 6.6 (H)   02/08/2019 7.2 (H)     CHOLESTEROL (mg/dL)   Date Value   08/12/2019 189   08/01/2018 168     Cholesterol (mg/dL)   Date Value   02/07/2020 178     HDL (mg/dL)   Date Value   02/07/2020 39 (L)   08/12/2019 41   08/01/2018 34 (L)     CALCULATED LDL (mg/dL)   Date Value   08/12/2019 110   08/01/2018 86     LDL (mg/dL)   Date Value   02/07/2020 107     TRIGLYCERIDE (mg/dL)   Date Value   08/12/2019 189 (H)   08/01/2018 239 (H)     Triglycerides (mg/dL)   Date Value   02/07/2020 158 (H)     Creatinine (mg/dL)   Date Value   02/07/2020 0.90   08/12/2019 0.96   02/08/2019 1.06      Ref. Range 2/7/2020 09:21   BUN Latest Ref Range: 6 - 20 mg/dL 25 (H)   BUN/CREATININE RATIO Latest Ref Range: 7 - 25  28 (H)     MICROALBUMIN, UA (TTL) (mg/dL)   Date Value   08/12/2019 2.81   02/08/2019 1.81     MICROALBUMIN/CREATININE (mg/g)   Date Value   08/12/2019 20.1   02/08/2019 13.8      Microalbumin/ Creatinine Ratio (mg/g)   Date Value   02/07/2020 13.0       Past Medical History:   Diagnosis Date   • Arthritis    • Bronchitis Childhood   • Colon polyps 12/03/2015    Repeat colonoscopy in 3 years   • Diabetes mellitus (CMS/HCC) ~2014    \"pre-diabetes\" - Does not regularily check blood sugar   • Fracture     toe   • HTN (hypertension)    • Hyperlipidemia    • Insomnia     intermittent   • Nocturia    • GELACIO (obstructive sleep apnea)     Mild - no CPAP use   • Prostate cancer (CMS/HCC) 02/15/2017    Biopsy proven - High Volume [place] 7   • PSA elevation    • Wears glasses     for driving       Past Surgical History:   Procedure Laterality Date   • Colonoscopy N/A 05/07/2019    Colonoscopy satisfied on 5/7/19. Follow up with another colonoscopy in 5 years.   • Colonoscopy w/ polypectomy  12/03/2015    two polyps removed   • Prostate biopsy  02/15/2017   • Removal of tonsils,<15 y/o  ~9 years old    Tonsillectomy Alone   • Vasectomy  @ ~ age 37       ALLERGIES:  No Known Allergies    Current Outpatient Medications   Medication Sig Dispense Refill   • JANUVIA 100 MG tablet TAKE 1 TABLET BY MOUTH  DAILY 90 tablet 3   • fenofibrate (TRICOR) 145 MG tablet TAKE 1 TABLET BY MOUTH  DAILY 90 tablet 1   • metFORMIN (GLUCOPHAGE) 500 MG tablet TAKE 1 TABLET BY MOUTH IN  THE MORNING AND 2 TABLETS  IN THE EVENING 270 tablet 1   • sildenafil (VIAGRA) 100 MG tablet Take 1 tablet by mouth as needed for Erectile Dysfunction. 30 tablet 11   • lisinopril-hydroCHLOROthiazide (PRINZIDE,ZESTORETIC) 20-25 MG per tablet TAKE 1 TABLET BY MOUTH  DAILY 90 tablet 1   • simvastatin (ZOCOR) 20 MG tablet TAKE 1 TABLET BY MOUTH  NIGHTLY 90 tablet 1   • Lancet Devices (ONE TOUCH DELICA LANCING DEV) MISC 1 each by Other route daily. Indications: Diabetes 100 each 3   • Glucose Blood (ONE TOUCH ULTRA TEST) strip 1 each by other route daily. 100 each 12   • Omega-3 Fatty Acids (FISH OIL) 1000 MG capsule Take 1 g by mouth daily.     Awake   • Ascorbic Acid (VITAMIN C) 500 MG tablet Take 500 mg by mouth daily.     • cholecalciferol (VITAMIN D3) 1000 UNITS tablet Take 1,000 Units by mouth daily.     • multivitamin (THERAGRAN) per tablet Take 1 tablet by mouth daily.     • oxyCODONE-acetaminophen (PERCOCET) 5-325 MG per tablet Take 1-2 tablets by mouth every 4-6 hours as needed for pain. 30 tablet 0     No current facility-administered medications for this visit.        Review of Systems:  As above per the HPI.  All other systems reviewed and otherwise negative.  Constitutional: Negative for fever and chills.   Skin: Negative for rash or lesion.  HEENT: Negative for eye drainage, rhinorrhea, ear pain, sore throat or neck pain.  Respiratory: Negative cough, wheezing or shortness of breath.    Cardiovascular: Negative for chest pain, chest pressure, palpitations or diaphoresis.   Gastrointestinal: Negative for nausea, vomiting, diarrhea or abdominal pain.   Genitourinary: Negative for dysuria, urgency, frequency, hematuria or flank pain.  Musculoskeletal: Reports lower back pain on occasion.         OBJECTIVE:  Visit Vitals  /74   Pulse 71   Resp 16   Ht 6' (1.829 m)   Wt 99.3 kg   SpO2 98%   BMI 29.69 kg/m²     General appearance: 61 year old male healthy, alert, in no distress  HEENT:normocephalic, External canals clear, Nl TMs, nose clear, mucous membranes pink, and moist, soft palate normal, gums normal, pupils equal and reactive to light and accommodation and extraocular muscles intact  Neck: Supple, w/o adenopathy or cervical masses. No JVD noted. Carotid pulsations palp  Skin: Skin color, texture, turgor are normal. There are no bruises, rashes or lesions. Scar on the occipital area where melanoma was previously excised shows no sign of reoccurrence.  Chest: Symmetric. Normal motion on quiet respiration. No palpable tenderness.  Lungs: restful breathing effort noted, Percussion normal. Good diaphragmatic excursion. Lungs clear  Heart: PMI  normal. No lifts, heaves, or thrills. RRR.    Abd: Soft, non-tender, bowel sounds normal, no masses, hepatomegaly or splenomegaly noted  soft, non-tender, BS normal, no masses or [id number] noted  Ext: Normal Bilateral Foot Exam:  Skin integrity is normal. Dorsalis pedis and posterior tibial pulses are present.  Pressure sensation using the [place]anjit monofilament is present. Vibratory sensation intact bilaterally.       ASSESSMENT:      Ata was seen today for follow-up.    Diagnoses and all orders for this visit:    Type 2 diabetes mellitus without complication, without long-term current use of insulin (CMS/HCC)    Hyperlipidemia, unspecified hyperlipidemia type    Essential hypertension    Prostate cancer (CMS/HCC)  -     Cancel: PSA; Future        Body mass index is 29.69 kg/m².    BMI ASSESSMENT/PLAN:  Patient is overweight.    30 minutes of physical activity a day     30 minutes of physical activity per day and 8 hours of sleep per night is recommended for general health.     Complete lab work in six months.        On 2/20/2020, Joseph AVELAR scribed the services personally performed by Imer Prado MD The documentation recorded by the scribe accurately and completely reflects the service(s) I personally performed and the decisions made by me.

## 2020-12-07 NOTE — BRIEF OPERATIVE NOTE - NSICDXBRIEFPOSTOP_GEN_ALL_CORE_FT
POST-OP DIAGNOSIS:  Endometrial polyp 07-Dec-2020 09:47:04  Jose Garcia  Adnexal mass 07-Dec-2020 09:45:21  Jose Garcia

## 2020-12-07 NOTE — BRIEF OPERATIVE NOTE - OPERATION/FINDINGS
normal sized retroflexed uterus, 6 cm simple appearing left ovarian cyst, normal right ovary, normal tubes b/l, normal appendix, normal upper abdomen, frozen pathology of left ovary benign. On hysteroscopy, 1 cm right lateral polyp and small sessile anterior polyp

## 2020-12-07 NOTE — ASU DISCHARGE PLAN (ADULT/PEDIATRIC) - CARE PROVIDER_API CALL
Rossy Orta)  OBSN  General [id number] [address]  Phone: [phone]  Fax: [phone]  Follow Up Time:

## 2020-12-07 NOTE — BRIEF OPERATIVE NOTE - COMMENTS
No adhesion barriers or hemostatic agents used  Single incision procedure No adhesion barriers or hemostatic agents used  Single incision procedure  Dictation: [id number]

## 2020-12-07 NOTE — ASU DISCHARGE PLAN (ADULT/PEDIATRIC) - ACTIVITY LEVEL
No heavy lifting/No sports/gym/Nothing per vagina/No tampons/No douching/No weight bearing/No tub baths/No excercise/No intercourse

## 2020-12-10 LAB — SURGICAL PATHOLOGY STUDY: SIGNIFICANT CHANGE UP

## 2020-12-22 ENCOUNTER — OUTPATIENT (OUTPATIENT)
Dept: OUTPATIENT SERVICES | Facility: HOSPITAL | Age: 45
LOS: 1 days | End: 2020-12-22
Payer: SELF-PAY

## 2020-12-22 ENCOUNTER — APPOINTMENT (OUTPATIENT)
Dept: OBGYN | Facility: CLINIC | Age: 45
End: 2020-12-22
Payer: COMMERCIAL

## 2020-12-22 VITALS — WEIGHT: 102 LBS | SYSTOLIC BLOOD PRESSURE: 126 MMHG | DIASTOLIC BLOOD PRESSURE: 80 MMHG | BODY MASS INDEX: 20.6 KG/M2

## 2020-12-22 VITALS — TEMPERATURE: 97.1 F

## 2020-12-22 DIAGNOSIS — R10.9 UNSPECIFIED ABDOMINAL PAIN: ICD-10-CM

## 2020-12-22 DIAGNOSIS — R10.13 EPIGASTRIC PAIN: ICD-10-CM

## 2020-12-22 DIAGNOSIS — N76.0 ACUTE VAGINITIS: ICD-10-CM

## 2020-12-22 DIAGNOSIS — Z98.890 OTHER SPECIFIED POSTPROCEDURAL STATES: Chronic | ICD-10-CM

## 2020-12-22 DIAGNOSIS — Z90.721 ACQUIRED ABSENCE OF OVARIES, UNILATERAL: ICD-10-CM

## 2020-12-22 DIAGNOSIS — R19.4 CHANGE IN BOWEL HABIT: ICD-10-CM

## 2020-12-22 PROCEDURE — 99213 OFFICE O/P EST LOW 20 MIN: CPT | Mod: NC

## 2020-12-22 PROCEDURE — G0463: CPT

## 2020-12-22 NOTE — HISTORY OF PRESENT ILLNESS
[4/10] : the patient reports pain that is 4/10 in severity [None] : no vaginal bleeding [Normal] : the vagina was normal [No Sign of Infection] : is showing no signs of infection [de-identified] : 45 y.o. G0, LMP: 12/2/20 presents on POD #15 s/p D&C, hysteroscopy and laparoscopic left salpingo-oophprectpmy. Pathology:  benign   [de-identified] : On exam, pt c/o continued pain on left side and on going GI issues. [de-identified] : Pt w/ continued c/o left sided pelvic pain, acid reflux, constipation.  All issues she had prior to surgery. [de-identified] : [] f/up in 4 weeks, 1/21/21 in surgery clinic for post op exam [] f/up w/ GI for the above on 1/14/21.  If cont'd pain, consider re-imaging.  KAYLEIGH Simmons, PAC

## 2020-12-23 PROBLEM — Z87.09 HISTORY OF UPPER RESPIRATORY INFECTION: Status: RESOLVED | Noted: 2019-05-13 | Resolved: 2020-12-23

## 2020-12-23 PROBLEM — Z87.440 HISTORY OF URINARY TRACT INFECTION: Status: RESOLVED | Noted: 2020-03-30 | Resolved: 2020-12-23

## 2021-01-07 ENCOUNTER — APPOINTMENT (OUTPATIENT)
Dept: OBGYN | Facility: CLINIC | Age: 46
End: 2021-01-07

## 2021-01-08 ENCOUNTER — APPOINTMENT (OUTPATIENT)
Dept: UROLOGY | Facility: CLINIC | Age: 46
End: 2021-01-08

## 2021-01-08 ENCOUNTER — OUTPATIENT (OUTPATIENT)
Dept: OUTPATIENT SERVICES | Facility: HOSPITAL | Age: 46
LOS: 1 days | End: 2021-01-08
Payer: SELF-PAY

## 2021-01-08 DIAGNOSIS — Z98.890 OTHER SPECIFIED POSTPROCEDURAL STATES: Chronic | ICD-10-CM

## 2021-01-08 DIAGNOSIS — R35.0 FREQUENCY OF MICTURITION: ICD-10-CM

## 2021-01-08 PROCEDURE — G0463: CPT

## 2021-01-08 NOTE — PHYSICAL EXAM
[General Appearance - Well Developed] : well developed [General Appearance - Well Nourished] : well nourished [Normal Appearance] : normal appearance [Well Groomed] : well groomed [General Appearance - In No Acute Distress] : no acute distress [Edema] : no peripheral edema [Respiration, Rhythm And Depth] : normal respiratory rhythm and effort [Exaggerated Use Of Accessory Muscles For Inspiration] : no accessory muscle use [Abdomen Soft] : soft [Abdomen Mass (___ Cm)] : no abdominal mass palpated [Costovertebral Angle Tenderness] : no ~M costovertebral angle tenderness [Urethral Meatus] : normal urethra [Urinary Bladder Findings] : the bladder was normal on palpation [External Female Genitalia] : normal external genitalia [Vagina] : normal vaginal exam [Normal Station and Gait] : the gait and station were normal for the patient's age [Skin Color & Pigmentation] : normal skin color and pigmentation [] : no rash [No Focal Deficits] : no focal deficits [Oriented To Time, Place, And Person] : oriented to person, place, and time [Affect] : the affect was normal [Mood] : the mood was normal [Not Anxious] : not anxious [FreeTextEntry1] : Chaperone Dr. Mariano present for pelvic exam.  + Pelvic floor tightness

## 2021-01-08 NOTE — ASSESSMENT
[FreeTextEntry1] : 45 year old female with Hx of Fibromyalgia, L ovarian cyst s/p laparoscopic L SO, D&C Hysteroscopy, presents for evaluation of Urinary Urgency.\par \par UA's previously negative with no sign of Bacteruria.\par Pelvic examination showed pelvic floor tightness, no external or internal vaginal or urethral lesions noted.\par Discussed etiology and treatment strategies for both overactive bladder and pelvic floor tightness.\par All r/b/a discussed, patient does not want to start Oxybutynin as it can lead to constipation and most of her issues are gastrointestinal in nature currently.\par Cannot afford Myrbetriq or alternative medications.\par \par -- UA, UCx\par -- Plan to RTC in 3 months to re-assess symptoms, possibly start medications if worsening.\par -- Plan to possibly implement intravaginal diazepam if nothing else\par

## 2021-01-08 NOTE — REVIEW OF SYSTEMS
[Abdominal Pain] : abdominal pain [Constipation] : constipation [Heartburn] : heartburn [see HPI] : see HPI [Arthralgias] : arthralgias [Negative] : Heme/Lymph

## 2021-01-09 LAB
APPEARANCE: CLEAR
BACTERIA: NEGATIVE
BILIRUBIN URINE: NEGATIVE
BLOOD URINE: NEGATIVE
COLOR: COLORLESS
GLUCOSE QUALITATIVE U: NEGATIVE
HYALINE CASTS: 0 /LPF
KETONES URINE: NEGATIVE
LEUKOCYTE ESTERASE URINE: NEGATIVE
MICROSCOPIC-UA: NORMAL
NITRITE URINE: NEGATIVE
PH URINE: 6
PROTEIN URINE: NEGATIVE
RED BLOOD CELLS URINE: 1 /HPF
SPECIFIC GRAVITY URINE: 1.01
SQUAMOUS EPITHELIAL CELLS: 0 /HPF
UROBILINOGEN URINE: NORMAL
WHITE BLOOD CELLS URINE: 1 /HPF

## 2021-01-10 LAB — BACTERIA UR CULT: NORMAL

## 2021-01-11 ENCOUNTER — APPOINTMENT (OUTPATIENT)
Dept: UROLOGY | Facility: HOSPITAL | Age: 46
End: 2021-01-11

## 2021-01-12 ENCOUNTER — APPOINTMENT (OUTPATIENT)
Dept: GASTROENTEROLOGY | Facility: HOSPITAL | Age: 46
End: 2021-01-12

## 2021-01-12 ENCOUNTER — OUTPATIENT (OUTPATIENT)
Dept: OUTPATIENT SERVICES | Facility: HOSPITAL | Age: 46
LOS: 1 days | End: 2021-01-12
Payer: SELF-PAY

## 2021-01-12 VITALS
HEIGHT: 59 IN | RESPIRATION RATE: 14 BRPM | HEART RATE: 92 BPM | WEIGHT: 99 LBS | SYSTOLIC BLOOD PRESSURE: 133 MMHG | TEMPERATURE: 98.3 F | BODY MASS INDEX: 19.96 KG/M2 | DIASTOLIC BLOOD PRESSURE: 89 MMHG

## 2021-01-12 DIAGNOSIS — N34.3 URETHRAL SYNDROME, UNSPECIFIED: ICD-10-CM

## 2021-01-12 DIAGNOSIS — N63.0 UNSPECIFIED LUMP IN UNSPECIFIED BREAST: ICD-10-CM

## 2021-01-12 DIAGNOSIS — K21.9 GASTRO-ESOPHAGEAL REFLUX DISEASE WITHOUT ESOPHAGITIS: ICD-10-CM

## 2021-01-12 DIAGNOSIS — R10.814 LEFT LOWER QUADRANT ABDOMINAL TENDERNESS: ICD-10-CM

## 2021-01-12 DIAGNOSIS — L23.9 ALLERGIC CONTACT DERMATITIS, UNSPECIFIED CAUSE: ICD-10-CM

## 2021-01-12 DIAGNOSIS — K59.09 OTHER CONSTIPATION: ICD-10-CM

## 2021-01-12 DIAGNOSIS — R10.13 EPIGASTRIC PAIN: ICD-10-CM

## 2021-01-12 DIAGNOSIS — Z98.890 OTHER SPECIFIED POSTPROCEDURAL STATES: Chronic | ICD-10-CM

## 2021-01-12 DIAGNOSIS — R10.9 UNSPECIFIED ABDOMINAL PAIN: ICD-10-CM

## 2021-01-12 DIAGNOSIS — N39.41 URGE INCONTINENCE: ICD-10-CM

## 2021-01-12 PROCEDURE — G0463: CPT

## 2021-01-12 NOTE — ASSESSMENT
[FreeTextEntry1] : 45F with history of Hpylori s/p triple therapy, lactose intolerance, constipation, fibromyalgia, newly diagnosed 6cm ovarian cyst presenting for continued symptoms of constipation, LLQ abd pain, epigastric pain/reflux. \par \par Impression: \par #Constipation, LLQ abd pain: pt with BM every 3 days, also continues to have LLQ abd pain/flank pain. Previously constipation responded to fiber but pt had discontinued due to c/f urinary symptoms. Understands she needs to take combination of fiber and miralax to tx her symptoms in order for them to improve. Also c/f IBS considering Hx of bloating/abd discomfort, will trial low FODMAP diet. \par #Epigastric pain: mild chronic inactive gastritis on EGD, neg H pylori, likely 2/2 functional dyspepsia, labs in 8/2020 with neg lipase, also constant in nature so less likely pancreatitis (GB and pancreas normal on CT 8/2020). Continues to have reflux/epigastric pain, stopped taking famotidine. Discouraged pt from taking apple cider vinegar. \par #Early satiety, weight loss: EGD as above, also in setting of decreased PO intake 2/2 epigastric burning\par \par \par Plan:\par - Prescribed Benefiber + miralax for constipation, encouraged pt to titrate regimen to 1 BM daily \par - Low FODMAP diet handout given \par - famotidine BID - adherence was discussed with patient to assess efficacy, if no improvement in next 2 weeks will advance to trial of PPI\par - Has been unable to get breath test for SIBO, can readdress next visit if symptoms still persist\par \par \par Leroy Ball MD \par GI Fellow PGY4

## 2021-01-12 NOTE — END OF VISIT
[] : Fellow [Time Spent: ___ minutes] : I have spent [unfilled] minutes of time on the encounter. [FreeTextEntry3] : As modified and discussed with patient\par MD KERRY Batres [facility]\par Associate Professor of Medicine\par [facility]\par

## 2021-01-12 NOTE — HISTORY OF PRESENT ILLNESS
[___ Month(s) Ago] : [unfilled] month(s) ago [de-identified] : 45 F w/ Hx H pylori s/p triple therapy, lactose intolerance, constipation, fibromyalgia presents for follow up of the following issues: \par \par -LLQ abd pain + left sided flank pain (8/10), not associated with "anything." Reports not improved by left ovary removal.\par - Concerned stools are very sticky, foul smelling, and thin bowel movements (has a BM every 3 days at times), had started taking fiber which helped but stopped taking it 2/2 urinary frequency \par - Concerned about gurgling noises in stomach \par - Throat burning in the AM, acid back wash/phlegm, reflux after eating. Food passes through, sometimes has symptoms of dysphagia. Stopped taking famotidine, doesn't help her--apple juice and aloe vera makes it better. Lost 8lbs since July, feels she cannot gain weight. Denies early satiety.\par \par Since last visit:  \par - Had laparoscopic left oopherectomy (did not want SARAH BSO) for 6-7cm ovarian cyst w/ sepations \par - Seeing urology for \par - Colonoscopy 10/7/20 wnl \par \par \par Previous GI visit (9/22/20): had symptoms since last visit which included epigastric burning abdominal pain, weight loss (total 7lbs since July), sticky stools, globus sensation without dysphagia/odynophagia and left sided periumbilical pain. She reports she has been eating less because of the burning pain, constant however occasionally improves but does not go away, does not radiate. No affected by food intake or body position. Denies melena or hematochezia, NSAID use. She tried 2 doses of famotidine and stopped because it did not help.  \par \par Also continues to have foul smelling sticky stools occasionally. Sometimes will be hard and solid, sometimes softer and sticky. Has 1BM every day normally hwoever does not feel fully evacuated.  Denies f/c /n/v. \par \par Since last visit had +stool studies with [facility] (no longer having diarrhea). S/P EGD 9/2 with chronic mild inactive gastritis, neg H pylori, neg esophageal bx. She followed up with Gyn and had repeat TVUS which showed new left ovarian cyst 6cm with septation.

## 2021-01-15 LAB
HCG UR QL: NEGATIVE
QUALITY CONTROL: YES

## 2021-01-20 ENCOUNTER — APPOINTMENT (OUTPATIENT)
Dept: INTERNAL MEDICINE | Facility: CLINIC | Age: 46
End: 2021-01-20
Payer: MEDICAID

## 2021-01-20 ENCOUNTER — OUTPATIENT (OUTPATIENT)
Dept: OUTPATIENT SERVICES | Facility: HOSPITAL | Age: 46
LOS: 1 days | End: 2021-01-20
Payer: SELF-PAY

## 2021-01-20 VITALS
HEIGHT: 59 IN | DIASTOLIC BLOOD PRESSURE: 60 MMHG | HEART RATE: 68 BPM | SYSTOLIC BLOOD PRESSURE: 110 MMHG | WEIGHT: 102 LBS | OXYGEN SATURATION: 98 % | BODY MASS INDEX: 20.56 KG/M2

## 2021-01-20 DIAGNOSIS — M79.641 PAIN IN RIGHT HAND: ICD-10-CM

## 2021-01-20 DIAGNOSIS — Z98.890 OTHER SPECIFIED POSTPROCEDURAL STATES: Chronic | ICD-10-CM

## 2021-01-20 DIAGNOSIS — K59.09 OTHER CONSTIPATION: ICD-10-CM

## 2021-01-20 DIAGNOSIS — K21.9 GASTRO-ESOPHAGEAL REFLUX DISEASE WITHOUT ESOPHAGITIS: ICD-10-CM

## 2021-01-20 DIAGNOSIS — R10.13 EPIGASTRIC PAIN: ICD-10-CM

## 2021-01-20 DIAGNOSIS — I10 ESSENTIAL (PRIMARY) HYPERTENSION: ICD-10-CM

## 2021-01-20 PROCEDURE — 99212 OFFICE O/P EST SF 10 MIN: CPT | Mod: GE

## 2021-01-20 PROCEDURE — G0463: CPT

## 2021-01-21 ENCOUNTER — OUTPATIENT (OUTPATIENT)
Dept: OUTPATIENT SERVICES | Facility: HOSPITAL | Age: 46
LOS: 1 days | End: 2021-01-21
Payer: SELF-PAY

## 2021-01-21 ENCOUNTER — APPOINTMENT (OUTPATIENT)
Dept: OBGYN | Facility: CLINIC | Age: 46
End: 2021-01-21
Payer: COMMERCIAL

## 2021-01-21 VITALS — DIASTOLIC BLOOD PRESSURE: 70 MMHG | SYSTOLIC BLOOD PRESSURE: 104 MMHG | BODY MASS INDEX: 20.6 KG/M2 | WEIGHT: 102 LBS

## 2021-01-21 VITALS — TEMPERATURE: 97.1 F

## 2021-01-21 DIAGNOSIS — N76.0 ACUTE VAGINITIS: ICD-10-CM

## 2021-01-21 DIAGNOSIS — R10.9 UNSPECIFIED ABDOMINAL PAIN: ICD-10-CM

## 2021-01-21 DIAGNOSIS — Z98.890 OTHER SPECIFIED POSTPROCEDURAL STATES: Chronic | ICD-10-CM

## 2021-01-21 PROCEDURE — G0463: CPT

## 2021-01-21 PROCEDURE — 99213 OFFICE O/P EST LOW 20 MIN: CPT | Mod: GE

## 2021-01-21 NOTE — END OF VISIT
[FreeTextEntry3] : Agree with above assesment, agree with above assessment. Pt with persistant LLQ pain after surgery, explained prior to surgery that LLQ pain may not be gyn in nature. discussed to see PCP regarding, likely not gyn at this time\par \benedicto Orta MD

## 2021-01-21 NOTE — REVIEW OF SYSTEMS
[Abdominal Pain] : abdominal pain [Fever] : no fever [Chills] : no chills [Chest Pain] : no chest pain [Palpitations] : no palpitations [Diarrhea] : diarrhea [Vomiting] : no vomiting [Urgency] : no urgency [Frequency] : no frequency [Incontinence] : no incontinence [Dysuria] : no dysuria [Pelvic pain] : no pelvic pain [Headache] : no headache [Dizziness] : no dizziness

## 2021-01-21 NOTE — PHYSICAL EXAM
[Appropriately responsive] : appropriately responsive [Alert] : alert [No Acute Distress] : no acute distress [Regular Rate Rhythm] : regular rate rhythm [Clear to Auscultation B/L] : clear to auscultation bilaterally [Soft] : soft [Non-distended] : non-distended [No Lesions] : no lesions [No Mass] : no mass [Oriented x3] : oriented x3 [Labia Majora] : normal [Labia Minora] : normal [Pink Rugae] : pink rugae [No Bleeding] : There was no active vaginal bleeding [Normal] : normal [Anteversion] : anteverted [Uterine Adnexae] : normal [FreeTextEntry7] : Umbilical incision well healed. Pt with tenderness in LLQ that radiates up and around the back when palpated. No rebound or guarding.  [Tenderness] : nontender [Enlarged ___ wks] : not enlarged

## 2021-01-26 ENCOUNTER — NON-APPOINTMENT (OUTPATIENT)
Age: 46
End: 2021-01-26

## 2021-01-26 ENCOUNTER — APPOINTMENT (OUTPATIENT)
Dept: INTERNAL MEDICINE | Facility: CLINIC | Age: 46
End: 2021-01-26
Payer: MEDICAID

## 2021-01-26 ENCOUNTER — OUTPATIENT (OUTPATIENT)
Dept: OUTPATIENT SERVICES | Facility: HOSPITAL | Age: 46
LOS: 1 days | End: 2021-01-26
Payer: SELF-PAY

## 2021-01-26 DIAGNOSIS — Z98.890 OTHER SPECIFIED POSTPROCEDURAL STATES: Chronic | ICD-10-CM

## 2021-01-26 DIAGNOSIS — F41.9 ANXIETY DISORDER, UNSPECIFIED: ICD-10-CM

## 2021-01-26 DIAGNOSIS — F32.9 ANXIETY DISORDER, UNSPECIFIED: ICD-10-CM

## 2021-01-26 DIAGNOSIS — I10 ESSENTIAL (PRIMARY) HYPERTENSION: ICD-10-CM

## 2021-01-26 PROCEDURE — G0463: CPT

## 2021-01-26 PROCEDURE — ZZZZZ: CPT

## 2021-01-26 NOTE — REVIEW OF SYSTEMS
[Fever] : no fever [Chills] : no chills [Night Sweats] : no night sweats [Chest Pain] : no chest pain [Palpitations] : no palpitations [Leg Claudication] : no leg claudication [Nausea] : no nausea [Vomiting] : no vomiting [de-identified] : feeling warm

## 2021-01-26 NOTE — HISTORY OF PRESENT ILLNESS
[FreeTextEntry8] : Syriac PI: [id number]/ [id number]\par \par \par 45 year woman with PMHx significant for Hpylori s/p triple therapy, lactose intolerance, constipation, fibromyalgia, complex ovarian cyst (s/p L salpingo-oophorectomy 11/2020) presenting for telephonic visit for inability to sleep. Office called this morning - patient notted to have concerns over rash on her face - this has led to trouble sleeping for last 4 days. Patient has seen Dr. Shaw several times in past for PTSD and trouble sleeping. Of note, patient recently seen in the office on 1/20/2021 for hand pain, chronic in nature nd recently had surgery for LLQ pain  with salpingo-oophorectomy without relief. \par \par For the past 4 days she has not be able to sleep. Her body feels very warm and her heart starts to race. She is mainly concerned about a spot on her face for the last month that hast not gone away - her parents had skin cancer - though she has a dermatologist appointment already scheduled ofr 1/28/21. She tried increasing her melatonin dose which has not helped her. This happened previously when she had covid in March which was also related to anxiety. Has not been having any coffee - drinking chamomile tea. No alcohol. No SI/HI. she feels that she is going to go crazy if she is not able to sleep. No AH/VH though happened to her a few years. She was prescribed medications at [facility] - one of the medications was mirtazapine and she did not like the side effects. \par \par

## 2021-01-26 NOTE — ASSESSMENT
[FreeTextEntry1] : 50 minutes spent total on this telephonic visit. \par \par \par \par \par \par Gustavo Montana, pgy 2

## 2021-01-28 ENCOUNTER — APPOINTMENT (OUTPATIENT)
Dept: DERMATOLOGY | Facility: HOSPITAL | Age: 46
End: 2021-01-28

## 2021-01-28 ENCOUNTER — NON-APPOINTMENT (OUTPATIENT)
Age: 46
End: 2021-01-28

## 2021-01-28 ENCOUNTER — OUTPATIENT (OUTPATIENT)
Dept: OUTPATIENT SERVICES | Facility: HOSPITAL | Age: 46
LOS: 1 days | End: 2021-01-28
Payer: SELF-PAY

## 2021-01-28 ENCOUNTER — APPOINTMENT (OUTPATIENT)
Dept: OBGYN | Facility: CLINIC | Age: 46
End: 2021-01-28

## 2021-01-28 VITALS
HEIGHT: 59 IN | TEMPERATURE: 98.3 F | DIASTOLIC BLOOD PRESSURE: 82 MMHG | SYSTOLIC BLOOD PRESSURE: 121 MMHG | WEIGHT: 102 LBS | HEART RATE: 61 BPM | RESPIRATION RATE: 14 BRPM | BODY MASS INDEX: 20.56 KG/M2

## 2021-01-28 DIAGNOSIS — L21.9 SEBORRHEIC DERMATITIS, UNSPECIFIED: ICD-10-CM

## 2021-01-28 DIAGNOSIS — Z98.890 OTHER SPECIFIED POSTPROCEDURAL STATES: Chronic | ICD-10-CM

## 2021-01-28 DIAGNOSIS — F41.9 ANXIETY DISORDER, UNSPECIFIED: ICD-10-CM

## 2021-01-28 DIAGNOSIS — L70.9 ACNE, UNSPECIFIED: ICD-10-CM

## 2021-01-28 DIAGNOSIS — G47.00 INSOMNIA, UNSPECIFIED: ICD-10-CM

## 2021-01-28 DIAGNOSIS — L98.9 DISORDER OF THE SKIN AND SUBCUTANEOUS TISSUE, UNSPECIFIED: ICD-10-CM

## 2021-01-28 DIAGNOSIS — L98.0 PYOGENIC GRANULOMA: ICD-10-CM

## 2021-01-28 PROCEDURE — G0463: CPT

## 2021-01-28 RX ORDER — LIDOCAINE 5% 700 MG/1
5 PATCH TOPICAL
Qty: 10 | Refills: 3 | Status: DISCONTINUED | COMMUNITY
Start: 2020-07-21 | End: 2021-01-28

## 2021-01-28 RX ORDER — PANTOPRAZOLE 40 MG/1
40 TABLET, DELAYED RELEASE ORAL DAILY
Qty: 30 | Refills: 2 | Status: DISCONTINUED | COMMUNITY
Start: 2020-11-17 | End: 2021-01-28

## 2021-01-28 RX ORDER — FLUCONAZOLE 150 MG/1
150 TABLET ORAL
Qty: 3 | Refills: 3 | Status: DISCONTINUED | COMMUNITY
Start: 2020-03-16 | End: 2021-01-28

## 2021-01-28 RX ORDER — KETOCONAZOLE 20 MG/G
2 CREAM TOPICAL TWICE DAILY
Qty: 1 | Refills: 3 | Status: ACTIVE | COMMUNITY
Start: 2021-01-28 | End: 1900-01-01

## 2021-01-28 RX ORDER — NITROFURANTOIN (MONOHYDRATE/MACROCRYSTALS) 25; 75 MG/1; MG/1
100 CAPSULE ORAL
Qty: 10 | Refills: 0 | Status: DISCONTINUED | COMMUNITY
Start: 2020-03-30 | End: 2021-01-28

## 2021-01-28 RX ORDER — BENZOYL PEROXIDE 2.5 G/100G
2.5 GEL TOPICAL TWICE DAILY
Qty: 1 | Refills: 0 | Status: DISCONTINUED | COMMUNITY
Start: 2020-11-17 | End: 2021-01-28

## 2021-01-28 RX ORDER — CLOTRIMAZOLE AND BETAMETHASONE DIPROPIONATE 10; .5 MG/G; MG/G
1-0.05 CREAM TOPICAL
Qty: 1 | Refills: 3 | Status: DISCONTINUED | COMMUNITY
Start: 2020-04-23 | End: 2021-01-28

## 2021-01-28 RX ORDER — POLYETHYLENE GLYCOL 3350 AND ELECTROLYTES WITH LEMON FLAVOR 236; 22.74; 6.74; 5.86; 2.97 G/4L; G/4L; G/4L; G/4L; G/4L
236 POWDER, FOR SOLUTION ORAL
Qty: 1 | Refills: 0 | Status: DISCONTINUED | COMMUNITY
Start: 2020-09-22 | End: 2021-01-28

## 2021-01-29 DIAGNOSIS — L21.9 SEBORRHEIC DERMATITIS, UNSPECIFIED: ICD-10-CM

## 2021-01-29 DIAGNOSIS — G47.00 INSOMNIA, UNSPECIFIED: ICD-10-CM

## 2021-01-29 DIAGNOSIS — R23.8 OTHER SKIN CHANGES: ICD-10-CM

## 2021-01-29 DIAGNOSIS — L70.9 ACNE, UNSPECIFIED: ICD-10-CM

## 2021-01-29 DIAGNOSIS — F41.9 ANXIETY DISORDER, UNSPECIFIED: ICD-10-CM

## 2021-01-29 DIAGNOSIS — L85.3 XEROSIS CUTIS: ICD-10-CM

## 2021-02-01 ENCOUNTER — APPOINTMENT (OUTPATIENT)
Dept: INTERNAL MEDICINE | Facility: CLINIC | Age: 46
End: 2021-02-01

## 2021-02-03 ENCOUNTER — OUTPATIENT (OUTPATIENT)
Dept: OUTPATIENT SERVICES | Facility: HOSPITAL | Age: 46
LOS: 1 days | Discharge: HOME | End: 2021-02-03

## 2021-02-03 DIAGNOSIS — Z98.890 OTHER SPECIFIED POSTPROCEDURAL STATES: Chronic | ICD-10-CM

## 2021-02-03 PROCEDURE — 90840 PSYTX CRISIS EA ADDL 30 MIN: CPT

## 2021-02-03 PROCEDURE — 90839 PSYTX CRISIS INITIAL 60 MIN: CPT

## 2021-02-04 DIAGNOSIS — F43.10 POST-TRAUMATIC STRESS DISORDER, UNSPECIFIED: ICD-10-CM

## 2021-02-09 ENCOUNTER — APPOINTMENT (OUTPATIENT)
Dept: INTERNAL MEDICINE | Facility: CLINIC | Age: 46
End: 2021-02-09

## 2021-02-12 ENCOUNTER — APPOINTMENT (OUTPATIENT)
Dept: UROLOGY | Facility: CLINIC | Age: 46
End: 2021-02-12

## 2021-02-12 ENCOUNTER — OUTPATIENT (OUTPATIENT)
Dept: OUTPATIENT SERVICES | Facility: HOSPITAL | Age: 46
LOS: 1 days | End: 2021-02-12
Payer: SELF-PAY

## 2021-02-12 DIAGNOSIS — Z98.890 OTHER SPECIFIED POSTPROCEDURAL STATES: Chronic | ICD-10-CM

## 2021-02-12 DIAGNOSIS — R35.0 FREQUENCY OF MICTURITION: ICD-10-CM

## 2021-02-12 PROCEDURE — G0463: CPT

## 2021-02-12 NOTE — ASSESSMENT
[FreeTextEntry1] : 45 year old female with Hx of Fibromyalgia, L ovarian cyst s/p laparoscopic L SO, D&C Hysteroscopy, presents for evaluation of Urinary Urgency.\par \par UA's previously negative with no sign of Bacteruria.\par Pelvic examination showed pelvic floor tightness, no external or internal vaginal or urethral lesions noted.\par Discussed etiology and treatment strategies for both overactive bladder and pelvic floor tightness.\par All r/b/a discussed, patient willing to try oxybutynin at this time.\par \par - Repeat urine culture\par - Discussed starting a voiding diary and timed voiding\par - Will send oxybutynin to her pharmacy\par - Will plan to f/u in one month to reassess symptoms\par

## 2021-02-12 NOTE — HISTORY OF PRESENT ILLNESS
[FreeTextEntry1] : 45 year old female with PMHx of Fibromyalgia, GERD, Left ovarian cyst s/p D&C, Hysteroscopy, Lap LSO with benign pathology, presents with 7 months of urinary urgency. \par First felt she had been urinating more than baseline around 5 years ago, but has been a problem persistently for 7 months. Notably COVID + around the same time.\par Has a severe urge to urinate and has to run to the bathroom or else she will leak a little\par No reported leakage with sneezing or coughing\par Reports feeling like pins and needles are sticking her urethra and her vagina when urinating occasionally, and has felt like her labia are red and irritated when urinating but denies urine touching the labia during urination.\par Still has persistent abdominal and gastrointestinal discomfort \par \par Patient is currently experiencing no nausea urinary incontinence, urinary urgency and urinary frequency, but no urinary retention, no nocturia, no straining, no weak stream, no dysuria, no gross hematuria, no fever and no fatigue. \par \par Since last visit:\par Persistent symptoms since last visit.  Burning pain with urination.  Says she has started taking magnesium which has improved her constipation and pelvic pain.  Recently given clonazepam for insomnia, but did not tolerate it well.   Drinking no caffeinated beverages.  Drinks 8-10 glasses of water per day.  Avoids spicy foods.

## 2021-02-13 LAB
APPEARANCE: CLEAR
BACTERIA UR CULT: NORMAL
BACTERIA: NEGATIVE
BILIRUBIN URINE: NEGATIVE
BLOOD URINE: NEGATIVE
COLOR: YELLOW
GLUCOSE QUALITATIVE U: NEGATIVE
HYALINE CASTS: 1 /LPF
KETONES URINE: NEGATIVE
LEUKOCYTE ESTERASE URINE: NEGATIVE
MICROSCOPIC-UA: NORMAL
NITRITE URINE: NEGATIVE
PH URINE: 7.5
PROTEIN URINE: NORMAL
RED BLOOD CELLS URINE: 3 /HPF
SPECIFIC GRAVITY URINE: 1.02
SQUAMOUS EPITHELIAL CELLS: 2 /HPF
UROBILINOGEN URINE: NORMAL
WHITE BLOOD CELLS URINE: 0 /HPF

## 2021-02-16 DIAGNOSIS — N32.81 OVERACTIVE BLADDER: ICD-10-CM

## 2021-02-16 DIAGNOSIS — R10.2 PELVIC AND PERINEAL PAIN: ICD-10-CM

## 2021-02-17 ENCOUNTER — NON-APPOINTMENT (OUTPATIENT)
Age: 46
End: 2021-02-17

## 2021-02-25 ENCOUNTER — APPOINTMENT (OUTPATIENT)
Dept: INTERNAL MEDICINE | Facility: CLINIC | Age: 46
End: 2021-02-25

## 2021-02-25 ENCOUNTER — OUTPATIENT (OUTPATIENT)
Dept: OUTPATIENT SERVICES | Facility: HOSPITAL | Age: 46
LOS: 1 days | End: 2021-02-25
Payer: SELF-PAY

## 2021-02-25 ENCOUNTER — NON-APPOINTMENT (OUTPATIENT)
Age: 46
End: 2021-02-25

## 2021-02-25 VITALS
SYSTOLIC BLOOD PRESSURE: 110 MMHG | OXYGEN SATURATION: 99 % | HEART RATE: 66 BPM | DIASTOLIC BLOOD PRESSURE: 70 MMHG | BODY MASS INDEX: 19.76 KG/M2 | HEIGHT: 59 IN | WEIGHT: 98 LBS

## 2021-02-25 DIAGNOSIS — Z98.890 OTHER SPECIFIED POSTPROCEDURAL STATES: Chronic | ICD-10-CM

## 2021-02-25 DIAGNOSIS — I10 ESSENTIAL (PRIMARY) HYPERTENSION: ICD-10-CM

## 2021-02-25 DIAGNOSIS — G47.00 INSOMNIA, UNSPECIFIED: ICD-10-CM

## 2021-02-25 PROCEDURE — G0463: CPT

## 2021-03-02 ENCOUNTER — OUTPATIENT (OUTPATIENT)
Dept: OUTPATIENT SERVICES | Facility: HOSPITAL | Age: 46
LOS: 1 days | End: 2021-03-02
Payer: SELF-PAY

## 2021-03-02 ENCOUNTER — OUTPATIENT (OUTPATIENT)
Dept: OUTPATIENT SERVICES | Facility: HOSPITAL | Age: 46
LOS: 1 days | End: 2021-03-02

## 2021-03-02 ENCOUNTER — RESULT REVIEW (OUTPATIENT)
Age: 46
End: 2021-03-02

## 2021-03-02 ENCOUNTER — APPOINTMENT (OUTPATIENT)
Dept: MAMMOGRAPHY | Facility: IMAGING CENTER | Age: 46
End: 2021-03-02
Payer: SELF-PAY

## 2021-03-02 ENCOUNTER — APPOINTMENT (OUTPATIENT)
Dept: ULTRASOUND IMAGING | Facility: IMAGING CENTER | Age: 46
End: 2021-03-02
Payer: SELF-PAY

## 2021-03-02 DIAGNOSIS — Z98.890 OTHER SPECIFIED POSTPROCEDURAL STATES: Chronic | ICD-10-CM

## 2021-03-02 DIAGNOSIS — N76.0 ACUTE VAGINITIS: ICD-10-CM

## 2021-03-02 DIAGNOSIS — N60.01 SOLITARY CYST OF RIGHT BREAST: ICD-10-CM

## 2021-03-02 DIAGNOSIS — N63.0 UNSPECIFIED LUMP IN UNSPECIFIED BREAST: ICD-10-CM

## 2021-03-02 DIAGNOSIS — Z00.00 ENCOUNTER FOR GENERAL ADULT MEDICAL EXAMINATION WITHOUT ABNORMAL FINDINGS: ICD-10-CM

## 2021-03-02 PROCEDURE — 77063 BREAST TOMOSYNTHESIS BI: CPT | Mod: 26

## 2021-03-02 PROCEDURE — 77067 SCR MAMMO BI INCL CAD: CPT

## 2021-03-02 PROCEDURE — 76642 ULTRASOUND BREAST LIMITED: CPT | Mod: 26,50

## 2021-03-02 PROCEDURE — 77063 BREAST TOMOSYNTHESIS BI: CPT

## 2021-03-02 PROCEDURE — 77067 SCR MAMMO BI INCL CAD: CPT | Mod: 26

## 2021-03-02 PROCEDURE — 76642 ULTRASOUND BREAST LIMITED: CPT

## 2021-03-12 ENCOUNTER — APPOINTMENT (OUTPATIENT)
Dept: UROLOGY | Facility: CLINIC | Age: 46
End: 2021-03-12

## 2021-03-16 NOTE — HISTORY OF PRESENT ILLNESS
[FreeTextEntry1] : Insomnia [de-identified] : Ms. Arce is a 44 y/o with pmhx of H.Pylori s/p triple therapy who presents for persistent insomnia. Her insomnia is chronic and has been occurring for over 20  years and the patient feels like she cannot function normally due to lack of sleep. She has been followed  by Dr. Colletti and another outpatient psychiatrist and stares that she has definitely improved. However she states that she had an event that occurred in 2015 where she had hallucinations and bad thoughts that cause her anxiety and insomnia to worsen. She denies any SI or HI and has never made a plan to hurt herself. Patient states that the last time she saw her psychiatrist was 3 weeks ago and she was prescribed venlafaxine to take in AM but has not taken the medication. Patient states that she was afraid to take it because she does not want to become addicted to medications. She was also  prescribed Klonopin by her psychiatrist but patient states that she has been limiting her use because she does not want to become addicted. Patient has tried different home remedies such as melatonin, chamomile tea but has only been able to sleep 3-4 hours a night. The lack of sleep is affecting her life and she is not able to work at her job because she is so exhausted .

## 2021-03-16 NOTE — PLAN
[FreeTextEntry1] : #Insomnia\par - patient was encouraged to start her prescribed venlafaxine given to her by per psychiatrist and to follow up with them on her results\par - she was educated that the medication can take 4-6 weeks before results can be seen \par - patient taking Klonopin as needed, prescribed but her psychiatrist \par - patient requested sleep disorders referral , was given\par - encouraged to practice good sleep practices  \par \par D/w Dr. Fermin\par

## 2021-03-16 NOTE — PHYSICAL EXAM
[Alert and Oriented x3] : oriented to person, place, and time [Normal Insight/Judgement] : insight and judgment were intact [Normal Mental Status] : the patient's orientation, memory, attention, language and fund of knowledge were normal [Appropriate] : appropriate [Anxious] : anxious [Normal] : normal throught processes [Normal Associations] :  no deficiency [Impaired judgment] : intact judgment [Impaired Insight] : intact insight [Delusions] : exhibited no delusions [Hallucinations] : exhibited no hallucinations [Obsessions] : denied obsessions [Preoccupation with Violence] : denied preoccupation with violent thoughts [Suicidal Ideation] : denied suicidal ideation [Suicidal Intent] : denied suicidal intent [Suicidal Plan] : denied suicidal plans [Homicidal Ideation] : denied homicidal ideation [Homicidal Intent] : denied homicidal intention [Homicidal Plan] : denied homicidal plans

## 2021-03-16 NOTE — END OF VISIT
[] : Resident [FreeTextEntry3] : 44yo evaluated with Dr. Church at time of clinic visit; confirm and agree with H&p as noted. F/u for persistent insomnia, has been following with MH provider and encouraged to continue with plan as outlined. Re-educated on sleep hygiene, and long timeline for anticipated onset of venlafaxine.

## 2021-03-25 NOTE — HISTORY OF PRESENT ILLNESS
[FreeTextEntry8] : Ms. DEL REAL is a 45 year woman with PMHx significant for Hpylori s/p triple therapy, lactose intolerance, constipation, fibromyalgia,  complex ovarian cyst (s/p L salpingo-oophorectomy 11/2020) presenting to clinic for right hand pain. Pt has had this pain before, describes as an ache worse in the morning. She is concerned that her wrist looks 'deformed' compared to her sister's. She thinks that her fingers are not moving as they normally should. Says that the pain was made better with meloxicam and exercise, but has recently worsened. No fevers, chills, skin changes, warmth, swelling. She saw rheumatology in the past, and testing was negative for RA, SLE, lyme disease, autoimmune arhtritis. She had a hand XR in 2018 which was normal. Otherwise pt is expressing anxieties regarding other medical conditions including dry skin, abdominal pain and frequent urination, and has follow ups with specialists for these issues.\par

## 2021-03-25 NOTE — PHYSICAL EXAM
[No Acute Distress] : no acute distress [Well Developed] : well developed [Normal Sclera/Conjunctiva] : normal sclera/conjunctiva [EOMI] : extraocular movements intact [No Respiratory Distress] : no respiratory distress  [Clear to Auscultation] : lungs were clear to auscultation bilaterally [Normal Rate] : normal rate  [Regular Rhythm] : with a regular rhythm [No Murmur] : no murmur heard [No Edema] : there was no peripheral edema [Soft] : abdomen soft [Non Tender] : non-tender [Non-distended] : non-distended [Normal Bowel Sounds] : normal bowel sounds [No Joint Swelling] : no joint swelling [Grossly Normal Strength/Tone] : grossly normal strength/tone [Coordination Grossly Intact] : coordination grossly intact [No Focal Deficits] : no focal deficits [de-identified] : A [de-identified] : Normal  strength b/l. Normal FROM. No joint swelling, tenderness, warmth, erythema. [de-identified] : Anxious

## 2021-03-25 NOTE — REVIEW OF SYSTEMS
[Abdominal Pain] : abdominal pain [Constipation] : constipation [Frequency] : frequency [Joint Pain] : joint pain [Muscle Pain] : muscle pain [Fever] : no fever [Chills] : no chills [Discharge] : no discharge [Vision Problems] : no vision problems [Earache] : no earache [Nasal Discharge] : no nasal discharge [Chest Pain] : no chest pain [Palpitations] : no palpitations [Shortness Of Breath] : no shortness of breath [Cough] : no cough [Nausea] : no nausea [Diarrhea] : diarrhea [Vomiting] : no vomiting [Dysuria] : no dysuria [Hematuria] : no hematuria [Itching] : no itching [Skin Rash] : no skin rash [Headache] : no headache [Memory Loss] : no memory loss

## 2021-03-29 ENCOUNTER — APPOINTMENT (OUTPATIENT)
Dept: RADIOLOGY | Facility: IMAGING CENTER | Age: 46
End: 2021-03-29

## 2021-03-29 ENCOUNTER — EMERGENCY (EMERGENCY)
Facility: HOSPITAL | Age: 46
LOS: 1 days | Discharge: HOME | End: 2021-03-29
Attending: EMERGENCY MEDICINE
Payer: MEDICAID

## 2021-03-29 VITALS
HEART RATE: 60 BPM | DIASTOLIC BLOOD PRESSURE: 86 MMHG | SYSTOLIC BLOOD PRESSURE: 118 MMHG | OXYGEN SATURATION: 100 % | RESPIRATION RATE: 18 BRPM

## 2021-03-29 VITALS
TEMPERATURE: 98 F | RESPIRATION RATE: 18 BRPM | SYSTOLIC BLOOD PRESSURE: 130 MMHG | DIASTOLIC BLOOD PRESSURE: 82 MMHG | HEIGHT: 60 IN | OXYGEN SATURATION: 96 % | HEART RATE: 79 BPM | WEIGHT: 98.11 LBS

## 2021-03-29 DIAGNOSIS — Z98.890 OTHER SPECIFIED POSTPROCEDURAL STATES: Chronic | ICD-10-CM

## 2021-03-29 LAB
ALBUMIN SERPL ELPH-MCNC: 4.5 G/DL — SIGNIFICANT CHANGE UP (ref 3.3–5)
ALP SERPL-CCNC: 53 U/L — SIGNIFICANT CHANGE UP (ref 40–120)
ALT FLD-CCNC: 17 U/L — SIGNIFICANT CHANGE UP (ref 10–45)
ANION GAP SERPL CALC-SCNC: 8 MMOL/L — SIGNIFICANT CHANGE UP (ref 5–17)
APPEARANCE UR: CLEAR — SIGNIFICANT CHANGE UP
AST SERPL-CCNC: 19 U/L — SIGNIFICANT CHANGE UP (ref 10–40)
BACTERIA # UR AUTO: NEGATIVE — SIGNIFICANT CHANGE UP
BASOPHILS # BLD AUTO: 0.06 K/UL — SIGNIFICANT CHANGE UP (ref 0–0.2)
BASOPHILS NFR BLD AUTO: 1.1 % — SIGNIFICANT CHANGE UP (ref 0–2)
BILIRUB SERPL-MCNC: 0.2 MG/DL — SIGNIFICANT CHANGE UP (ref 0.2–1.2)
BILIRUB UR-MCNC: NEGATIVE — SIGNIFICANT CHANGE UP
BUN SERPL-MCNC: 7 MG/DL — SIGNIFICANT CHANGE UP (ref 7–23)
CALCIUM SERPL-MCNC: 9.1 MG/DL — SIGNIFICANT CHANGE UP (ref 8.4–10.5)
CHLORIDE SERPL-SCNC: 104 MMOL/L — SIGNIFICANT CHANGE UP (ref 96–108)
CO2 SERPL-SCNC: 27 MMOL/L — SIGNIFICANT CHANGE UP (ref 22–31)
COLOR SPEC: COLORLESS — SIGNIFICANT CHANGE UP
CREAT SERPL-MCNC: 0.7 MG/DL — SIGNIFICANT CHANGE UP (ref 0.5–1.3)
DIFF PNL FLD: NEGATIVE — SIGNIFICANT CHANGE UP
EOSINOPHIL # BLD AUTO: 0.23 K/UL — SIGNIFICANT CHANGE UP (ref 0–0.5)
EOSINOPHIL NFR BLD AUTO: 4.1 % — SIGNIFICANT CHANGE UP (ref 0–6)
EPI CELLS # UR: 0 /HPF — SIGNIFICANT CHANGE UP
GLUCOSE SERPL-MCNC: 82 MG/DL — SIGNIFICANT CHANGE UP (ref 70–99)
GLUCOSE UR QL: NEGATIVE — SIGNIFICANT CHANGE UP
HCT VFR BLD CALC: 42.2 % — SIGNIFICANT CHANGE UP (ref 34.5–45)
HGB BLD-MCNC: 13.5 G/DL — SIGNIFICANT CHANGE UP (ref 11.5–15.5)
HYALINE CASTS # UR AUTO: 1 /LPF — SIGNIFICANT CHANGE UP (ref 0–2)
IMM GRANULOCYTES NFR BLD AUTO: 0.2 % — SIGNIFICANT CHANGE UP (ref 0–1.5)
KETONES UR-MCNC: NEGATIVE — SIGNIFICANT CHANGE UP
LEUKOCYTE ESTERASE UR-ACNC: NEGATIVE — SIGNIFICANT CHANGE UP
LYMPHOCYTES # BLD AUTO: 1.82 K/UL — SIGNIFICANT CHANGE UP (ref 1–3.3)
LYMPHOCYTES # BLD AUTO: 32.3 % — SIGNIFICANT CHANGE UP (ref 13–44)
MCHC RBC-ENTMCNC: 29.5 PG — SIGNIFICANT CHANGE UP (ref 27–34)
MCHC RBC-ENTMCNC: 32 GM/DL — SIGNIFICANT CHANGE UP (ref 32–36)
MCV RBC AUTO: 92.3 FL — SIGNIFICANT CHANGE UP (ref 80–100)
MONOCYTES # BLD AUTO: 0.34 K/UL — SIGNIFICANT CHANGE UP (ref 0–0.9)
MONOCYTES NFR BLD AUTO: 6 % — SIGNIFICANT CHANGE UP (ref 2–14)
NEUTROPHILS # BLD AUTO: 3.17 K/UL — SIGNIFICANT CHANGE UP (ref 1.8–7.4)
NEUTROPHILS NFR BLD AUTO: 56.3 % — SIGNIFICANT CHANGE UP (ref 43–77)
NITRITE UR-MCNC: NEGATIVE — SIGNIFICANT CHANGE UP
NRBC # BLD: 0 /100 WBCS — SIGNIFICANT CHANGE UP (ref 0–0)
PH UR: 7 — SIGNIFICANT CHANGE UP (ref 5–8)
PLATELET # BLD AUTO: 309 K/UL — SIGNIFICANT CHANGE UP (ref 150–400)
POTASSIUM SERPL-MCNC: 4.2 MMOL/L — SIGNIFICANT CHANGE UP (ref 3.5–5.3)
POTASSIUM SERPL-SCNC: 4.2 MMOL/L — SIGNIFICANT CHANGE UP (ref 3.5–5.3)
PROT SERPL-MCNC: 7.4 G/DL — SIGNIFICANT CHANGE UP (ref 6–8.3)
PROT UR-MCNC: NEGATIVE — SIGNIFICANT CHANGE UP
RBC # BLD: 4.57 M/UL — SIGNIFICANT CHANGE UP (ref 3.8–5.2)
RBC # FLD: 12.7 % — SIGNIFICANT CHANGE UP (ref 10.3–14.5)
RBC CASTS # UR COMP ASSIST: 0 /HPF — SIGNIFICANT CHANGE UP (ref 0–4)
SODIUM SERPL-SCNC: 139 MMOL/L — SIGNIFICANT CHANGE UP (ref 135–145)
SP GR SPEC: 1 — LOW (ref 1.01–1.02)
UROBILINOGEN FLD QL: NEGATIVE — SIGNIFICANT CHANGE UP
WBC # BLD: 5.63 K/UL — SIGNIFICANT CHANGE UP (ref 3.8–10.5)
WBC # FLD AUTO: 5.63 K/UL — SIGNIFICANT CHANGE UP (ref 3.8–10.5)
WBC UR QL: 0 /HPF — SIGNIFICANT CHANGE UP (ref 0–5)

## 2021-03-29 PROCEDURE — 80053 COMPREHEN METABOLIC PANEL: CPT

## 2021-03-29 PROCEDURE — 96374 THER/PROPH/DIAG INJ IV PUSH: CPT

## 2021-03-29 PROCEDURE — 99284 EMERGENCY DEPT VISIT MOD MDM: CPT | Mod: 25

## 2021-03-29 PROCEDURE — 96375 TX/PRO/DX INJ NEW DRUG ADDON: CPT

## 2021-03-29 PROCEDURE — 85025 COMPLETE CBC W/AUTO DIFF WBC: CPT

## 2021-03-29 PROCEDURE — 99284 EMERGENCY DEPT VISIT MOD MDM: CPT

## 2021-03-29 PROCEDURE — 81001 URINALYSIS AUTO W/SCOPE: CPT

## 2021-03-29 PROCEDURE — 83690 ASSAY OF LIPASE: CPT

## 2021-03-29 RX ORDER — ONDANSETRON 8 MG/1
4 TABLET, FILM COATED ORAL ONCE
Refills: 0 | Status: COMPLETED | OUTPATIENT
Start: 2021-03-29 | End: 2021-03-29

## 2021-03-29 RX ORDER — SODIUM CHLORIDE 9 MG/ML
1000 INJECTION INTRAMUSCULAR; INTRAVENOUS; SUBCUTANEOUS ONCE
Refills: 0 | Status: COMPLETED | OUTPATIENT
Start: 2021-03-29 | End: 2021-03-29

## 2021-03-29 RX ORDER — KETOROLAC TROMETHAMINE 30 MG/ML
15 SYRINGE (ML) INJECTION ONCE
Refills: 0 | Status: DISCONTINUED | OUTPATIENT
Start: 2021-03-29 | End: 2021-03-29

## 2021-03-29 RX ADMIN — ONDANSETRON 4 MILLIGRAM(S): 8 TABLET, FILM COATED ORAL at 08:18

## 2021-03-29 RX ADMIN — SODIUM CHLORIDE 1000 MILLILITER(S): 9 INJECTION INTRAMUSCULAR; INTRAVENOUS; SUBCUTANEOUS at 08:19

## 2021-03-29 RX ADMIN — Medication 15 MILLIGRAM(S): at 08:19

## 2021-03-29 NOTE — ED PROVIDER NOTE - PROGRESS NOTE DETAILS
Bin, PGY1: Patient reevaluated and doing well. Reports mild improvement of symptoms Discussed lab findings with patient. Patient feels comfortable going home. Gave home care and follow up with GI. Discussed which symptoms to look out for and when to return to the ED for further evaluation. Patient given opportunity to ask questions about their medical condition and had all questions answered.

## 2021-03-29 NOTE — ED PROVIDER NOTE - NSFOLLOWUPCLINICS_GEN_ALL_ED_FT
[facility]  Gastroenterology  [address] [place] [id number]  Phone: [phone]  Fax:   Follow Up Time: 1-3 Days

## 2021-03-29 NOTE — ED ADULT NURSE NOTE - NSIMPLEMENTINTERV_GEN_ALL_ED
Implemented All Universal Safety Interventions:  Tangent to call system. Call bell, personal items and telephone within reach. Instruct patient to call for assistance. Room bathroom lighting operational. Non-slip footwear when patient is off stretcher. Physically safe environment: no spills, clutter or unnecessary equipment. Stretcher in lowest position, wheels locked, appropriate side rails in place.

## 2021-03-29 NOTE — ED PROVIDER NOTE - CLINICAL SUMMARY MEDICAL DECISION MAKING FREE TEXT BOX
46 yo F with hx of fibromyalgia, GERD, and L sided ovarian cyst s/p L salpingo-oophorectomy presenting with L sided abdominal pain x 1 year. Multiple chronic GI complaints, with extensive outpatient workup. No acute new symptoms today. Well appearing, vss, L midabdomen ttp. Will treat nausea/pain, check labs, and likely d/c with GI f/u. 46 yo F with hx of fibromyalgia, GERD, and L sided ovarian cyst s/p L salpingo-oophorectomy presenting with L sided abdominal pain x 1 year. Multiple chronic GI complaints, with extensive outpatient workup. No acute new symptoms today. Well appearing, vss, L midabdomen ttp. Will treat nausea/pain, check labs, and likely d/c with GI f/u.    Manfred: 45 year old female with GERD, fibromyalgia and left sided ovarian cyst s/p l-salpingo ooporectomy here with left sided abdominal pain x1  year.  patient states pain similar to prior episode and thought would be better now after left salpingo oophrectomy.  no fever, no chills. pain not any different or more severe than past. had prior imaging. will get labs, offered imaging, patient declined at this time. will refer to outpatient GI. will d/c home.

## 2021-03-29 NOTE — ED PROVIDER NOTE - NS ED ROS FT
Constitutional:  (-) fever, (-) chills, (-) lethargy  Eyes:  (-) eye pain (-) visual changes  ENMT: (-) nasal discharge, (-) sore throat. (-) neck pain or stiffness  Cardiac: (-) chest pain (-) palpitations  Respiratory:  (-) cough (-) respiratory distress.   GI:  (+) nausea (-) vomiting (-) diarrhea (+) abdominal pain.  :  (-) dysuria (-) frequency (-) burning.  MS:  (-) back pain (-) joint pain.  Neuro:  (-) headache (-) numbness (-) tingling (-) focal weakness  Skin:  (-) rash  Except as documented in the HPI,  all other systems are negative.

## 2021-03-29 NOTE — ED PROVIDER NOTE - PATIENT PORTAL LINK FT
You can access the FollowMyHealth Patient Portal offered by [facility] by registering at the following website: [email or web address]. By joining [employer]’s FollowMyHealth portal, you will also be able to view your health information using other applications (apps) compatible with our system.

## 2021-03-29 NOTE — ED ADULT NURSE REASSESSMENT NOTE - NS ED NURSE REASSESS COMMENT FT1
pt sitting comfortably  with call bell in reach and ready for discharge/follow up with GI. pt reports pain improving post Ketorolac administration. pt denies: chest pain, HA, N/V/D, BRBPR, dysuria, sob.

## 2021-03-29 NOTE — ED PROVIDER NOTE - NSFOLLOWUPINSTRUCTIONS_ED_ALL_ED_FT
You were evaluated in the Emergency Department for ABDOMINAL PAIN.    There are no signs of emergency conditions requiring admission to the hospital on today's workup.      Further evaluation may be required by your primary care doctor or specialist for a definitive diagnosis.  Therefore, follow up as directed and if symptoms change/worsen or any emergency conditions, please return to the ER.    We recommend that you:  1. See your primary care physician within the next 1-3 DAYS for follow up.  Bring a copy of your discharge paperwork (including any test results) to your doctor.    2. See the GASTROENTEROLOGIST within the next 1-3 DAYS for follow up.  Bring a copy of your discharge paperwork (including any test results) to your doctor.    3. Continue to take your medications as prescribed.       *** Return immediately if you have any other new/concerning symptoms. ***

## 2021-03-29 NOTE — ED PROVIDER NOTE - PHYSICAL EXAMINATION
Gen: Patient is well-appearing, NAD, AOx3, able to ambulate without assistance.  HEENT: NCAT, EOMI, DEANNE, normal conjunctiva, tongue midline, oral mucosa moist.  Lung: CTAB, no respiratory distress, no wheezes/rhonchi/rales B/L, speaking in full sentences  CV: RRR, no murmurs, rubs or gallops, distal pulses 2+ b/l.  Abd: soft, L midabdominal ttp, ND, no guarding, no rigidity, no rebound tenderness, no CVA tenderness.   MSK: no visible deformities, ROM normal in UE/LE, no back TTP.  Neuro: No focal sensory or motor deficits.  Skin: Warm, well perfused, no rash, no leg swelling.  Psych: normal affect, calm.

## 2021-03-29 NOTE — ED PROVIDER NOTE - OBJECTIVE STATEMENT
44 yo F with hx of fibromyalgia, GERD, and L sided ovarian cyst s/p L salpingo-oophorectomy presenting with L sided abdominal pain x 1 year. Patient reports constant daily L sided abdominal pain  with occasional episodes of foul-smelling diarrhea, acid reflux. She had a CT scan in Aug 2020 showing 6 cm ovarian cyst without concern for torsion, with scheduled removal in Dec 2020. She reports no relief to L sided abdominal pain. She subsequently has had endoscopy and colonoscopies, and was treated for H pylori. 44 yo F with hx of fibromyalgia, GERD, and L sided ovarian cyst s/p L salpingo-oophorectomy presenting with L sided abdominal pain x 1 year. Patient reports constant daily L sided abdominal pain  with occasional episodes of foul-smelling diarrhea, acid reflux. She had a CT scan in Aug 2020 showing 6 cm ovarian cyst without concern for torsion, with scheduled removal in Dec 2020. She reports no relief to L sided abdominal pain and since surgery pain has been radiating to L flank. She subsequently has had endoscopy and colonoscopies, and was treated for H pylori. Denies urinary symptoms, fevers/chills, chest pain, sob. Today she reports dull pain and mild nausea.

## 2021-03-29 NOTE — ED ADULT TRIAGE NOTE - CHIEF COMPLAINT QUOTE
left sided abdominal pain for two months. s/p abdominal surgery December 2020 for cyst removal fallopian tube

## 2021-03-29 NOTE — ED ADULT NURSE NOTE - OBJECTIVE STATEMENT
Pt with hx of fibromyalgia c/o LLQ pain. pt reports pain has been present for about a year. pt admits to hx of left sided ovarian cyst removal xs 3-4 months ago. pt also reports being treated for H. Pylori and is currently on famotidine, zinc, vit C and magnesium supplements. pt states, "But sometimes I don't take the Famotidine." pt has had intermittent constipation and admits symptoms have been worse over the past few days with mild nausea. pt reports stool is, "Thin, sticky and it stinks" pt reports having a lot of fiber in her diet. pt followed by Gastro [facility]YERS. pt admits stool was firm today.  pt denies: BRBPR, CP, HA, SOB, numbness/weakness, saddle anesthesia, urinary symptoms, pregnancy, back pain,

## 2021-03-29 NOTE — ED ADULT NURSE NOTE - PMH
Complex ovarian cyst    Fibromyalgia    GERD (gastroesophageal reflux disease)    H pylori ulcer  treated  HPV (human papilloma virus) infection    Insomnia

## 2021-04-05 NOTE — ED POST DISCHARGE NOTE - ADDITIONAL DOCUMENTATION
GI appt scheduled at [facility] for 6/8 - pt has to reapply for sliding scale

## 2021-04-22 ENCOUNTER — APPOINTMENT (OUTPATIENT)
Dept: DERMATOLOGY | Facility: HOSPITAL | Age: 46
End: 2021-04-22
Payer: MEDICAID

## 2021-04-22 ENCOUNTER — OUTPATIENT (OUTPATIENT)
Dept: OUTPATIENT SERVICES | Facility: HOSPITAL | Age: 46
LOS: 1 days | End: 2021-04-22
Payer: SELF-PAY

## 2021-04-22 VITALS
DIASTOLIC BLOOD PRESSURE: 72 MMHG | SYSTOLIC BLOOD PRESSURE: 111 MMHG | WEIGHT: 97 LBS | TEMPERATURE: 97.4 F | BODY MASS INDEX: 19.56 KG/M2 | HEIGHT: 59 IN | HEART RATE: 59 BPM

## 2021-04-22 DIAGNOSIS — R23.8 OTHER SKIN CHANGES: ICD-10-CM

## 2021-04-22 DIAGNOSIS — L85.3 XEROSIS CUTIS: ICD-10-CM

## 2021-04-22 DIAGNOSIS — D22.9 MELANOCYTIC NEVI, UNSPECIFIED: ICD-10-CM

## 2021-04-22 DIAGNOSIS — Z98.890 OTHER SPECIFIED POSTPROCEDURAL STATES: Chronic | ICD-10-CM

## 2021-04-22 DIAGNOSIS — L98.9 DISORDER OF THE SKIN AND SUBCUTANEOUS TISSUE, UNSPECIFIED: ICD-10-CM

## 2021-04-22 PROCEDURE — G0463: CPT

## 2021-04-22 PROCEDURE — 99213 OFFICE O/P EST LOW 20 MIN: CPT

## 2021-04-22 RX ORDER — CLONAZEPAM 0.5 MG/1
0.5 TABLET ORAL
Qty: 5 | Refills: 0 | Status: DISCONTINUED | COMMUNITY
Start: 2021-01-26 | End: 2021-04-22

## 2021-04-22 RX ORDER — IPRATROPIUM BROMIDE 17 UG/1
17 AEROSOL, METERED RESPIRATORY (INHALATION) 4 TIMES DAILY
Qty: 1 | Refills: 0 | Status: DISCONTINUED | COMMUNITY
Start: 2020-01-29 | End: 2021-04-22

## 2021-04-22 RX ORDER — PSYLLIUM HUSK 0.4 G
1000-800 CAPSULE ORAL
Qty: 90 | Refills: 3 | Status: DISCONTINUED | COMMUNITY
Start: 2018-04-20 | End: 2021-04-22

## 2021-04-22 RX ORDER — PHENAZOPYRIDINE 200 MG/1
200 TABLET, FILM COATED ORAL 3 TIMES DAILY
Qty: 9 | Refills: 0 | Status: DISCONTINUED | COMMUNITY
Start: 2021-02-05 | End: 2021-04-22

## 2021-05-12 NOTE — ASU PATIENT PROFILE, ADULT - PAIN LOCATION
Initial Anesthesia Post-op Note    Patient: Gabino Johnson  Procedure(s) Performed: LITHOTRIPSY, ESWL, RIGHT EXTRACORPOREAL SHOCK WAVE LITHOTROPSY(ESWL) WITH STENT PLACEMENT. - RIGHTINSERTION, STENT, URETER - RIGHT  Anesthesia type: MAC    Vitals Value Taken Time   Temp 36.2 °C (97.2 °F) 05/12/21 1007   Pulse 65 05/12/21 1007   Resp 16 05/12/21 1007   SpO2 97 % 05/12/21 1007   /53 05/12/21 1007         Patient Location: PACU Phase 1  Post-op Vital Signs:stable  Level of Consciousness: participates in exam, awake, oriented, alert, answers questions appropriately and sedated  Respiratory Status: spontaneous ventilation, unassisted and room air  Cardiovascular stable and blood pressure returned to baseline  Hydration: euvolemic  Pain Management: well controlled  Handoff: Handoff to receiving nurse was performed and questions were answered Nausea: None  Airway Patency:patent  Post-op Assessment: awake, alert, appropriately conversant, or baseline, no complications, patient tolerated procedure well with no complications and no evidence of recall      No complications documented.  
esophagus and abdomen

## 2021-05-13 ENCOUNTER — APPOINTMENT (OUTPATIENT)
Dept: DERMATOLOGY | Facility: HOSPITAL | Age: 46
End: 2021-05-13

## 2021-05-14 ENCOUNTER — APPOINTMENT (OUTPATIENT)
Dept: UROLOGY | Facility: CLINIC | Age: 46
End: 2021-05-14

## 2021-06-05 ENCOUNTER — EMERGENCY (EMERGENCY)
Facility: HOSPITAL | Age: 46
LOS: 1 days | Discharge: HOME | End: 2021-06-05
Attending: EMERGENCY MEDICINE
Payer: MEDICAID

## 2021-06-05 VITALS
OXYGEN SATURATION: 98 % | TEMPERATURE: 98 F | WEIGHT: 110.01 LBS | DIASTOLIC BLOOD PRESSURE: 71 MMHG | HEIGHT: 60 IN | HEART RATE: 75 BPM | SYSTOLIC BLOOD PRESSURE: 125 MMHG | RESPIRATION RATE: 19 BRPM

## 2021-06-05 VITALS
HEART RATE: 64 BPM | OXYGEN SATURATION: 100 % | DIASTOLIC BLOOD PRESSURE: 59 MMHG | RESPIRATION RATE: 16 BRPM | TEMPERATURE: 98 F | SYSTOLIC BLOOD PRESSURE: 121 MMHG

## 2021-06-05 DIAGNOSIS — Z98.890 OTHER SPECIFIED POSTPROCEDURAL STATES: Chronic | ICD-10-CM

## 2021-06-05 PROCEDURE — 99283 EMERGENCY DEPT VISIT LOW MDM: CPT

## 2021-06-05 PROCEDURE — 99284 EMERGENCY DEPT VISIT MOD MDM: CPT

## 2021-06-05 RX ORDER — ACETAMINOPHEN 500 MG
975 TABLET ORAL ONCE
Refills: 0 | Status: COMPLETED | OUTPATIENT
Start: 2021-06-05 | End: 2021-06-05

## 2021-06-05 RX ORDER — PANTOPRAZOLE SODIUM 20 MG/1
40 TABLET, DELAYED RELEASE ORAL
Refills: 0 | Status: DISCONTINUED | OUTPATIENT
Start: 2021-06-05 | End: 2021-06-08

## 2021-06-05 RX ADMIN — PANTOPRAZOLE SODIUM 40 MILLIGRAM(S): 20 TABLET, DELAYED RELEASE ORAL at 11:16

## 2021-06-05 RX ADMIN — Medication 975 MILLIGRAM(S): at 11:16

## 2021-06-05 NOTE — ED PROVIDER NOTE - CLINICAL SUMMARY MEDICAL DECISION MAKING FREE TEXT BOX
Manfred: Patient with throat pain x 5 months. no fever, no chills. non toxic appearing. moving neck well. FROM of neck. no lymphadenopathy cervical. tmi b/l.  no erythema of throat/no exudates.  well appearing. will give pain control. PPI. patient has f/u with gi in 3 days for possible gerd contributing to throat irritation in am.  will refer to ENt outpatient.

## 2021-06-05 NOTE — ED PROVIDER NOTE - PATIENT PORTAL LINK FT
You can access the FollowMyHealth Patient Portal offered by [facility] by registering at the following website: [email or web address]. By joining [employer]’s FollowMyHealth portal, you will also be able to view your health information using other applications (apps) compatible with our system.

## 2021-06-05 NOTE — ED PROVIDER NOTE - NSFOLLOWUPINSTRUCTIONS_ED_ALL_ED_FT
Follow up with GI doctor in 3-5 days.   Follow up with ENT doctor in 3-5 days. (Our coordinator will contact you to make the appointment).   Take omeprazole once day for 14 days.   Take Tylenol 975 mg by mouth every 6 hours as needed for pain.   Return to the ER immediately for worsening pain.

## 2021-06-05 NOTE — ED ADULT NURSE NOTE - OBJECTIVE STATEMENT
44 yo female with a PMH of GERD presents to the ED ambulatory via waiting room from home complaining of throat pain x 5 months. Patient states throat pain initially began in January and has been following up with a gastroenterologist who started her on pepcid with some relief. Last dose was this morning around 0800. States that pain was increasingly worse today, rating 9/10. State that today she began having associating ear pain. Denies any throat swelling or feeling like the throat is closing up. No drooling present, speaking in full coherent sentences, clear voice noted. No redness noted in throat. Awaiting to be evaluated by ED attending. Denies headache, dizziness, vision changes, chest pain, shortness of breath, abdominal pain, nausea, vomiting, diarrhea, fevers, chills, dysuria, hematuria, recent illness travel or fall.

## 2021-06-05 NOTE — ED PROVIDER NOTE - OBJECTIVE STATEMENT
45 year old female with pmhx of gerd here with throat pain x 5 months. Patient reports usually its in the morning with some clear sputum production. saw GI doctor was advised to take pepcid twice a day. had stopped for a while and restarted this past week. advised if not improving, to start taking PPI. Patient denies fever, no chills. states throat pain is now all day instead of just in the morning. no difficulty swallowing, no painful swalloing. no n/v.  has f/u with Gi in 3 days. has not taken anything for pain.

## 2021-06-07 ENCOUNTER — NON-APPOINTMENT (OUTPATIENT)
Age: 46
End: 2021-06-07

## 2021-06-08 ENCOUNTER — APPOINTMENT (OUTPATIENT)
Dept: GASTROENTEROLOGY | Facility: HOSPITAL | Age: 46
End: 2021-06-08

## 2021-06-08 ENCOUNTER — OUTPATIENT (OUTPATIENT)
Dept: OUTPATIENT SERVICES | Facility: HOSPITAL | Age: 46
LOS: 1 days | End: 2021-06-08
Payer: SELF-PAY

## 2021-06-08 VITALS
HEART RATE: 57 BPM | BODY MASS INDEX: 19.76 KG/M2 | WEIGHT: 98 LBS | TEMPERATURE: 97.7 F | RESPIRATION RATE: 14 BRPM | HEIGHT: 59 IN | SYSTOLIC BLOOD PRESSURE: 127 MMHG | DIASTOLIC BLOOD PRESSURE: 72 MMHG

## 2021-06-08 DIAGNOSIS — Z98.890 OTHER SPECIFIED POSTPROCEDURAL STATES: Chronic | ICD-10-CM

## 2021-06-08 DIAGNOSIS — R10.13 EPIGASTRIC PAIN: ICD-10-CM

## 2021-06-08 DIAGNOSIS — R10.9 UNSPECIFIED ABDOMINAL PAIN: ICD-10-CM

## 2021-06-08 DIAGNOSIS — Z86.19 PERSONAL HISTORY OF OTHER INFECTIOUS AND PARASITIC DISEASES: ICD-10-CM

## 2021-06-08 DIAGNOSIS — K59.09 OTHER CONSTIPATION: ICD-10-CM

## 2021-06-08 PROCEDURE — G0463: CPT

## 2021-06-08 NOTE — HISTORY OF PRESENT ILLNESS
[Heartburn] : stable heartburn [Nausea] : denies nausea [Vomiting] : denies vomiting [Diarrhea] : denies diarrhea [de-identified] : 45 year old woman with h/o treated H. pylori infection, lactose intolerance, chronic constipation, fibromyalgia and functional dyspepsia who presents for follow up abdominal pain, constipation and functional dyspepsia.\par \par Throat pain: Constant, scratchy in quality, pain actually improves with foods. She also notes persistent regurgitation and acid reflux with food. Had ED visit on 6/5/21 for throat pain, treated with Tylenol and pantoprazole 40 mg PO, then referred to ENT. She started taking famotidine 20 mg every 12 hours 1 week ago - reports only transient relief in these symptoms. \par \par Abdominal pain, LLQ: Unchanged in character and radiation. Continues to report borborygmi - feels this is distressing. \par \par Abdominal bloating: Post-prandial but could not follow recommended FODMAP diet as she felt it was too restrictive. She abstains from bread and lactose-containing foods. \par \par Constipation: Thin, brown sticky stools, approx. once every 5 days on Psyllium Husk once daily (mixed with water). Previously, Psyllium has made her stools bulky and improved sensation of complete evacuation. She is now walking daily to help improve her GI motility.  Of note, patient reports she received free samples of Linzess (took once daily for approx. 16 days) but did not report any improvement in constipation. \par \par Colonoscopy 10/2020: Normal.\par EGD 09/2020: Normal. Gastric biopsies negative for HP and IM. \par \par

## 2021-06-08 NOTE — PHYSICAL EXAM
[General Appearance - Alert] : alert [Oropharynx] : the oropharynx was normal [Respiration, Rhythm And Depth] : normal respiratory rhythm and effort [Heart Rate And Rhythm] : heart rate was normal and rhythm regular [Edema] : there was no peripheral edema [Abdomen Soft] : soft [Cervical Lymph Nodes Enlarged Posterior Bilaterally] : posterior cervical [Cervical Lymph Nodes Enlarged Anterior Bilaterally] : anterior cervical [Motor Tone] : muscle strength and tone were normal [] : no rash [Motor Exam] : the motor exam was normal [Oriented To Time, Place, And Person] : oriented to person, place, and time [Sclera] : the sclera and conjunctiva were normal [Neck Appearance] : the appearance of the neck was normal [Abdomen Tenderness] : non-tender [FreeTextEntry1] : Mildly distended, tympanic on percussion

## 2021-06-08 NOTE — REVIEW OF SYSTEMS
[As Noted in HPI] : as noted in HPI [Recent Weight Loss (___ Lbs)] : recent [unfilled] ~Ulb weight loss [Fever] : no fever [Chills] : no chills [Sore Throat] : no sore throat [Chest Pain] : no chest pain [Lower Ext Edema] : no lower extremity edema [Shortness Of Breath] : no shortness of breath [Cough] : no cough [Arthralgias] : no arthralgias [Joint Pain] : no joint pain [Dizziness] : no dizziness [Muscle Weakness] : no muscle weakness [Easy Bleeding] : no tendency for easy bleeding [Easy Bruising] : no tendency for easy bruising [Negative] : Psychiatric

## 2021-06-08 NOTE — ASSESSMENT
[FreeTextEntry1] : 45 year old woman with history of treated H. pylori, lactose intolerance, constipation, fibromyalgia, newly diagnosed 6 cm ovarian cyst presenting for new throat pain, and continued symptoms of constipation,chronic LLQ abdominal pain pain, epigastric pain/reflux. \par \par Impression: \par \par # Constipation, chronic: Suspect colonic inertia vs. anorectal dysmotility vs less likely IBR-Type C given concomitant bloating. Currently having BM every 3-5 days on Psyllium Husk once daily (self-discontinued Miralax). \par # Abdominal pain, LLQ - Suspect chronic constipation vs. functional cause vs. IBS-type C given concomitant bloating vs. functional dyspepsia however unable to adhere to FODMAP diet. GB and pancreas normal on CT 8/2020.  \par # Throat pain: No clear relationship with food (improves with swallowing); no associated dysphagia. No esophagitis demonstrated on EGD from 09/2020 however cannot rule out non-erosive reflux. \par # Acid reflux: Ongoing reflux and regurgitation only transiently improved famotidine. No esophagitis or hiatal hernia demonstrated on EGD from 09/2020. Ddx includes LES incompetence vs. constipation vs. hypersensitive esophagus. \par \par Recommendations: \par - increase Psyllium husk to BID given some improvement in stool bulk\par - will start milk of magnesia as needed (normal serum Cr in 11/2020)\par - if above interventions do not improve stool bulk and frequency, will consider diagnostic testing for colonic inertia/anorectal dysnnergy at next visit (Smart Pill or sitz bath)\par - start 8 week trial of omeprazole 40 mg daily for reflux symptoms as she reports never having had PPI therapy before\par - instructed to identify and eliminate the most problematic foods within FODMAP diet as she finds complete adherence to diet too restrictive\par - will proceed with SIBO testing as ordered from 07/2020\par - RTC in 2-3 months to reassess symptoms\par \par D/w Dr. Padilla\par \par P Red PGY5\par GI Fellow\par

## 2021-06-10 ENCOUNTER — NON-APPOINTMENT (OUTPATIENT)
Age: 46
End: 2021-06-10

## 2021-06-11 ENCOUNTER — OUTPATIENT (OUTPATIENT)
Dept: OUTPATIENT SERVICES | Facility: HOSPITAL | Age: 46
LOS: 1 days | End: 2021-06-11
Payer: SELF-PAY

## 2021-06-11 ENCOUNTER — APPOINTMENT (OUTPATIENT)
Dept: INTERNAL MEDICINE | Facility: CLINIC | Age: 46
End: 2021-06-11

## 2021-06-11 VITALS
WEIGHT: 98 LBS | HEART RATE: 50 BPM | DIASTOLIC BLOOD PRESSURE: 80 MMHG | SYSTOLIC BLOOD PRESSURE: 120 MMHG | OXYGEN SATURATION: 99 % | BODY MASS INDEX: 19.76 KG/M2 | HEIGHT: 59 IN

## 2021-06-11 DIAGNOSIS — I10 ESSENTIAL (PRIMARY) HYPERTENSION: ICD-10-CM

## 2021-06-11 DIAGNOSIS — R07.0 PAIN IN THROAT: ICD-10-CM

## 2021-06-11 DIAGNOSIS — Z98.890 OTHER SPECIFIED POSTPROCEDURAL STATES: Chronic | ICD-10-CM

## 2021-06-11 DIAGNOSIS — R10.13 EPIGASTRIC PAIN: ICD-10-CM

## 2021-06-11 PROCEDURE — G0463: CPT

## 2021-06-12 ENCOUNTER — EMERGENCY (EMERGENCY)
Facility: HOSPITAL | Age: 46
LOS: 1 days | Discharge: HOME | End: 2021-06-12
Attending: EMERGENCY MEDICINE
Payer: MEDICAID

## 2021-06-12 VITALS
HEIGHT: 60 IN | SYSTOLIC BLOOD PRESSURE: 152 MMHG | RESPIRATION RATE: 18 BRPM | TEMPERATURE: 98 F | HEART RATE: 61 BPM | WEIGHT: 98.11 LBS | OXYGEN SATURATION: 99 % | DIASTOLIC BLOOD PRESSURE: 92 MMHG

## 2021-06-12 VITALS
SYSTOLIC BLOOD PRESSURE: 125 MMHG | RESPIRATION RATE: 20 BRPM | OXYGEN SATURATION: 99 % | DIASTOLIC BLOOD PRESSURE: 85 MMHG | HEART RATE: 55 BPM | TEMPERATURE: 98 F

## 2021-06-12 DIAGNOSIS — Z98.890 OTHER SPECIFIED POSTPROCEDURAL STATES: Chronic | ICD-10-CM

## 2021-06-12 LAB
ALBUMIN SERPL ELPH-MCNC: 4.4 G/DL — SIGNIFICANT CHANGE UP (ref 3.3–5)
ALP SERPL-CCNC: 49 U/L — SIGNIFICANT CHANGE UP (ref 40–120)
ALT FLD-CCNC: 22 U/L — SIGNIFICANT CHANGE UP (ref 10–45)
ANION GAP SERPL CALC-SCNC: 10 MMOL/L — SIGNIFICANT CHANGE UP (ref 5–17)
ANION GAP SERPL CALC-SCNC: 12 MMOL/L — SIGNIFICANT CHANGE UP (ref 5–17)
APPEARANCE UR: CLEAR — SIGNIFICANT CHANGE UP
AST SERPL-CCNC: 31 U/L — SIGNIFICANT CHANGE UP (ref 10–40)
BASE EXCESS BLDV CALC-SCNC: 2.4 MMOL/L — HIGH (ref -2–2)
BILIRUB SERPL-MCNC: 0.3 MG/DL — SIGNIFICANT CHANGE UP (ref 0.2–1.2)
BILIRUB UR-MCNC: NEGATIVE — SIGNIFICANT CHANGE UP
BUN SERPL-MCNC: 8 MG/DL — SIGNIFICANT CHANGE UP (ref 7–23)
BUN SERPL-MCNC: 8 MG/DL — SIGNIFICANT CHANGE UP (ref 7–23)
CA-I SERPL-SCNC: 1.15 MMOL/L — SIGNIFICANT CHANGE UP (ref 1.12–1.3)
CALCIUM SERPL-MCNC: 9 MG/DL — SIGNIFICANT CHANGE UP (ref 8.4–10.5)
CALCIUM SERPL-MCNC: 9.2 MG/DL — SIGNIFICANT CHANGE UP (ref 8.4–10.5)
CHLORIDE BLDV-SCNC: 109 MMOL/L — HIGH (ref 96–108)
CHLORIDE SERPL-SCNC: 104 MMOL/L — SIGNIFICANT CHANGE UP (ref 96–108)
CHLORIDE SERPL-SCNC: 107 MMOL/L — SIGNIFICANT CHANGE UP (ref 96–108)
CO2 BLDV-SCNC: 30 MMOL/L — SIGNIFICANT CHANGE UP (ref 22–30)
CO2 SERPL-SCNC: 22 MMOL/L — SIGNIFICANT CHANGE UP (ref 22–31)
CO2 SERPL-SCNC: 24 MMOL/L — SIGNIFICANT CHANGE UP (ref 22–31)
COLOR SPEC: COLORLESS — SIGNIFICANT CHANGE UP
CREAT SERPL-MCNC: 0.69 MG/DL — SIGNIFICANT CHANGE UP (ref 0.5–1.3)
CREAT SERPL-MCNC: 0.7 MG/DL — SIGNIFICANT CHANGE UP (ref 0.5–1.3)
CRP SERPL-MCNC: <3 MG/L — SIGNIFICANT CHANGE UP (ref 0–4)
DIFF PNL FLD: NEGATIVE — SIGNIFICANT CHANGE UP
ERYTHROCYTE [SEDIMENTATION RATE] IN BLOOD: 2 MM/HR — SIGNIFICANT CHANGE UP (ref 0–15)
GAS PNL BLDV: 139 MMOL/L — SIGNIFICANT CHANGE UP (ref 135–145)
GAS PNL BLDV: SIGNIFICANT CHANGE UP
GLUCOSE BLDV-MCNC: 107 MG/DL — HIGH (ref 70–99)
GLUCOSE SERPL-MCNC: 69 MG/DL — LOW (ref 70–99)
GLUCOSE SERPL-MCNC: 73 MG/DL — SIGNIFICANT CHANGE UP (ref 70–99)
GLUCOSE UR QL: NEGATIVE — SIGNIFICANT CHANGE UP
HCG SERPL-ACNC: <2 MIU/ML — SIGNIFICANT CHANGE UP
HCO3 BLDV-SCNC: 28 MMOL/L — SIGNIFICANT CHANGE UP (ref 21–29)
HCT VFR BLD CALC: 39.1 % — SIGNIFICANT CHANGE UP (ref 34.5–45)
HCT VFR BLDA CALC: 38 % — LOW (ref 39–50)
HGB BLD CALC-MCNC: 12.5 G/DL — SIGNIFICANT CHANGE UP (ref 11.5–15.5)
HGB BLD-MCNC: 12.7 G/DL — SIGNIFICANT CHANGE UP (ref 11.5–15.5)
KETONES UR-MCNC: NEGATIVE — SIGNIFICANT CHANGE UP
LACTATE BLDV-MCNC: 1 MMOL/L — SIGNIFICANT CHANGE UP (ref 0.7–2)
LEUKOCYTE ESTERASE UR-ACNC: NEGATIVE — SIGNIFICANT CHANGE UP
MCHC RBC-ENTMCNC: 29.7 PG — SIGNIFICANT CHANGE UP (ref 27–34)
MCHC RBC-ENTMCNC: 32.5 GM/DL — SIGNIFICANT CHANGE UP (ref 32–36)
MCV RBC AUTO: 91.4 FL — SIGNIFICANT CHANGE UP (ref 80–100)
NITRITE UR-MCNC: NEGATIVE — SIGNIFICANT CHANGE UP
NRBC # BLD: 0 /100 WBCS — SIGNIFICANT CHANGE UP (ref 0–0)
PCO2 BLDV: 51 MMHG — HIGH (ref 35–50)
PH BLDV: 7.36 — SIGNIFICANT CHANGE UP (ref 7.35–7.45)
PH UR: 6.5 — SIGNIFICANT CHANGE UP (ref 5–8)
PLATELET # BLD AUTO: 228 K/UL — SIGNIFICANT CHANGE UP (ref 150–400)
PO2 BLDV: 27 MMHG — SIGNIFICANT CHANGE UP (ref 25–45)
POTASSIUM BLDV-SCNC: 3.7 MMOL/L — SIGNIFICANT CHANGE UP (ref 3.5–5.3)
POTASSIUM SERPL-MCNC: 4 MMOL/L — SIGNIFICANT CHANGE UP (ref 3.5–5.3)
POTASSIUM SERPL-MCNC: 5.8 MMOL/L — HIGH (ref 3.5–5.3)
POTASSIUM SERPL-SCNC: 4 MMOL/L — SIGNIFICANT CHANGE UP (ref 3.5–5.3)
POTASSIUM SERPL-SCNC: 5.8 MMOL/L — HIGH (ref 3.5–5.3)
PROT SERPL-MCNC: 7.6 G/DL — SIGNIFICANT CHANGE UP (ref 6–8.3)
PROT UR-MCNC: NEGATIVE — SIGNIFICANT CHANGE UP
RBC # BLD: 4.28 M/UL — SIGNIFICANT CHANGE UP (ref 3.8–5.2)
RBC # FLD: 13.3 % — SIGNIFICANT CHANGE UP (ref 10.3–14.5)
SAO2 % BLDV: 41 % — LOW (ref 67–88)
SARS-COV-2 RNA SPEC QL NAA+PROBE: SIGNIFICANT CHANGE UP
SODIUM SERPL-SCNC: 138 MMOL/L — SIGNIFICANT CHANGE UP (ref 135–145)
SODIUM SERPL-SCNC: 141 MMOL/L — SIGNIFICANT CHANGE UP (ref 135–145)
SP GR SPEC: 1 — LOW (ref 1.01–1.02)
TSH SERPL-MCNC: 3.76 UIU/ML — SIGNIFICANT CHANGE UP (ref 0.27–4.2)
UROBILINOGEN FLD QL: NEGATIVE — SIGNIFICANT CHANGE UP
WBC # BLD: 4.74 K/UL — SIGNIFICANT CHANGE UP (ref 3.8–10.5)
WBC # FLD AUTO: 4.74 K/UL — SIGNIFICANT CHANGE UP (ref 3.8–10.5)

## 2021-06-12 PROCEDURE — 82435 ASSAY OF BLOOD CHLORIDE: CPT

## 2021-06-12 PROCEDURE — 99285 EMERGENCY DEPT VISIT HI MDM: CPT

## 2021-06-12 PROCEDURE — 85027 COMPLETE CBC AUTOMATED: CPT

## 2021-06-12 PROCEDURE — 87476 LYME DIS DNA AMP PROBE: CPT

## 2021-06-12 PROCEDURE — G1004: CPT

## 2021-06-12 PROCEDURE — 82803 BLOOD GASES ANY COMBINATION: CPT

## 2021-06-12 PROCEDURE — 71045 X-RAY EXAM CHEST 1 VIEW: CPT | Mod: 26

## 2021-06-12 PROCEDURE — 80053 COMPREHEN METABOLIC PANEL: CPT

## 2021-06-12 PROCEDURE — 82947 ASSAY GLUCOSE BLOOD QUANT: CPT

## 2021-06-12 PROCEDURE — 84132 ASSAY OF SERUM POTASSIUM: CPT

## 2021-06-12 PROCEDURE — 81003 URINALYSIS AUTO W/O SCOPE: CPT

## 2021-06-12 PROCEDURE — 86780 TREPONEMA PALLIDUM: CPT

## 2021-06-12 PROCEDURE — 71045 X-RAY EXAM CHEST 1 VIEW: CPT

## 2021-06-12 PROCEDURE — 82330 ASSAY OF CALCIUM: CPT

## 2021-06-12 PROCEDURE — 72148 MRI LUMBAR SPINE W/O DYE: CPT

## 2021-06-12 PROCEDURE — U0005: CPT

## 2021-06-12 PROCEDURE — 85014 HEMATOCRIT: CPT

## 2021-06-12 PROCEDURE — 85018 HEMOGLOBIN: CPT

## 2021-06-12 PROCEDURE — 84702 CHORIONIC GONADOTROPIN TEST: CPT

## 2021-06-12 PROCEDURE — 86140 C-REACTIVE PROTEIN: CPT

## 2021-06-12 PROCEDURE — 72148 MRI LUMBAR SPINE W/O DYE: CPT | Mod: 26,MG

## 2021-06-12 PROCEDURE — 93005 ELECTROCARDIOGRAM TRACING: CPT

## 2021-06-12 PROCEDURE — U0003: CPT

## 2021-06-12 PROCEDURE — 80048 BASIC METABOLIC PNL TOTAL CA: CPT

## 2021-06-12 PROCEDURE — 84295 ASSAY OF SERUM SODIUM: CPT

## 2021-06-12 PROCEDURE — 83605 ASSAY OF LACTIC ACID: CPT

## 2021-06-12 PROCEDURE — 84443 ASSAY THYROID STIM HORMONE: CPT

## 2021-06-12 PROCEDURE — 99284 EMERGENCY DEPT VISIT MOD MDM: CPT | Mod: 25

## 2021-06-12 PROCEDURE — 85652 RBC SED RATE AUTOMATED: CPT

## 2021-06-12 NOTE — ED ADULT NURSE NOTE - NSIMPLEMENTINTERV_GEN_ALL_ED
Implemented All Fall Risk Interventions:  [place] to call system. Call bell, personal items and telephone within reach. Instruct patient to call for assistance. Room bathroom lighting operational. Non-slip footwear when patient is off stretcher. Physically safe environment: no spills, clutter or unnecessary equipment. Stretcher in lowest position, wheels locked, appropriate side rails in place. Provide visual cue, wrist band, yellow gown, etc. Monitor gait and stability. Monitor for mental status changes and reorient to person, place, and time. Review medications for side effects contributing to fall risk. Reinforce activity limits and safety measures with patient and family.

## 2021-06-12 NOTE — CONSULT NOTE ADULT - ASSESSMENT
46 y/o right-handed woman from [facility] (pt declines , speaks English) with PMHx GERD (dx 06/2020), hx of treated H. pylori infection, lactose intolerance, chronic constipation s/p EGD (9/2020)/colonoscopy (10/2020), fibromyalgia, HPV, complex ovarian cyst (follows with GYN) presents with bilateral lower extremity weakness x 3 weeks for which Neurology has been consulted for. Neuro exam is non-focal. Lower extremity weakness is symmetric appears effort-related, though does not specifically complain of pain at time of strength testing. Reflexes 2+ throughout, no myelopathic signs. No sensory deficits. There is no suspicion for cord compression at this time. Possibly fibromyalgia vs. toxic/metabolic/infectious etiology vs. functional neurological disorder.    Recommendations:  - ESR, CRP, vitamin B12, homocysteine, methylmalonic acid  - Follow up CMP  - Follow up MRI lumbar spine w/w/o contrast  - If imaging negative, no further neurological workup required  - Physical therapy referral  - Outpatient follow up for management of anxiety, insomnia w/ appropriate specialists    Establish care with outpatient neurologist upon discharge in 1-2 weeks:  Dr. Michael Nissenbaum  [address], [place] [id number] [phone]    Plan to be discussed with Dr. Mitchell, neurology attending.    Lesli Enciso DO  PGY-2  Neurology Resident

## 2021-06-12 NOTE — ED PROVIDER NOTE - PMH
Asthma, unspecified asthma severity, unspecified whether complicated, unspecified whether persistent Asthma, unspecified asthma severity, unspecified whether complicated, unspecified whether persistent Complex ovarian cyst    Fibromyalgia    GERD (gastroesophageal reflux disease)    H pylori ulcer  treated  HPV (human papilloma virus) infection    Insomnia

## 2021-06-12 NOTE — ED PROVIDER NOTE - CLINICAL SUMMARY MEDICAL DECISION MAKING FREE TEXT BOX
45F with hx of GERD (dx 06/2020) p/w b/l leg weakness, x 3 weeks. Concern for cord compression given sudden onset b/l LE weakness. Ordered MRI L+S spine and neurology consult obtained. 45F with hx of GERD (dx 06/2020) p/w b/l leg weakness, x 3 weeks. Concern for cord compression given sudden onset b/l LE weakness and urinary incontinence ,sp covid vaccination in may ,doubt GB syndrome ,has normal reflexes  .will send Lyme ,sed rate crp ,vdrl  Ordered MRI L+S spine and neurology consult obtained.ZR 45F with hx of GERD (dx 06/2020) p/w b/l leg weakness, x 3 weeks. Concern for cord compression given sudden onset b/l LE weakness and urinary incontinence ,sp covid vaccination in may concern for weakness secondary to vaccination  ,doubt GB syndrome ,has normal reflexes  .will send Lyme ,sed rate crp ,vdrl  Ordered MRI L+S spine and neurology consult obtained.ZR

## 2021-06-12 NOTE — ED PROVIDER NOTE - PATIENT PORTAL LINK FT
You can access the FollowMyHealth Patient Portal offered by [facility] by registering at the following website: [email or web address]. By joining [employer]’s FollowMyHealth portal, you will also be able to view your health information using other applications (apps) compatible with our system.

## 2021-06-12 NOTE — ED PROVIDER NOTE - PHYSICAL EXAMINATION
PHYSICAL EXAM:  GENERAL: NAD, lying in bed comfortably  HEAD:  Atraumatic, Normocephalic  EYES: EOMI, PERRLA, conjunctiva and sclera clear  ENT: Moist mucous membranes  NECK: Supple, No JVD  CHEST/LUNG: Clear to auscultation bilaterally; No rales, rhonchi, wheezing, or rubs. Unlabored respirations  HEART: Regular rate and rhythm; No murmurs, rubs, or gallops  ABDOMEN: Bowel sounds present; Soft, Nontender, Nondistended  EXTREMITIES:  2+ Peripheral Pulses, brisk capillary refill. No clubbing, cyanosis, or edema  NERVOUS SYSTEM:  Alert & Oriented X3, speech clear. No deficits   MSK: UE strength 5/5, LE strength 3/5, LE reflex intact, no senosry deficits  SKIN: No rashes or lesions

## 2021-06-12 NOTE — CONSULT NOTE ADULT - CONSULT REASON
Medicare Annual Wellness Visit  Name: Doc Ramirez Date: 2021   MRN: [id number] Sex: Female   Age: 82 y.o. Ethnicity: Non-/Non    : 1937 Race: Christie Briceno is here for Medicare AWV (awv today)    Screenings for behavioral, psychosocial and functional/safety risks, and cognitive dysfunction are all negative except as indicated below. These results, as well as other patient data from the [address] form, are documented in Flowsheets linked to this Encounter. Allergies   Allergen Reactions    Adhesive Tape     Pravachol [Pravastatin]     Toprol Xl [Metoprolol]     Zithromax [Azithromycin]          Prior to Visit Medications    Medication Sig Taking? Authorizing Provider   LORazepam (ATIVAN) 0.5 MG tablet Take 0.5 mg by mouth 2 times daily.  Yes Historical Provider, MD   ketotifen (ALAWAY) 0.025 % ophthalmic solution Place 1 drop into the right eye daily Yes Historical Provider, MD         Past Medical History:   Diagnosis Date    Anxiety     Arthritis     Cancer ([facility] [address]     Depression     Eye disorder     Hives     Hyperlipidemia     Hypertension     Measles     Melanocytoma of retina     Osteoporosis     Thyroid disease     Vitamin B12 deficiency (non anemic)     Whooping cough        Past Surgical History:   Procedure Laterality Date    CARPAL TUNNEL RELEASE      CARPAL TUNNEL RELEASE Left     HAND    DILATION AND CURETTAGE OF UTERUS      EYE SURGERY      EYE SURGERY Left     EXTRACTED LEFT EYE - CANCER    JOINT REPLACEMENT      SHOULDER ARTHROPLASTY Right 2014    REVERSE    SHOULDER ARTHROPLASTY Bilateral     THYROIDECTOMY      TUBAL LIGATION      UTERINE SUSPENSION           Family History   Problem Relation Age of Onset    High Blood Pressure Mother     Other Sister         CEREBRAL INFARCTION    High Cholesterol Sister     High Blood Pressure Sister     High Cholesterol Brother     Heart Failure Brother     Cancer Brother     Diabetes Brother     Other Brother         CHRONIC LUNG DISEASE    Heart Attack Brother     Breast Cancer Niece        CareTeam (Including outside providers/suppliers regularly involved in providing care):   Patient Care Team:  Thea Salmeron DO as PCP - General  Thea Salmeron DO as PCP - [facility] Empaneled Provider    Wt Readings from Last 3 Encounters:   04/26/21 130 lb (59 kg)   04/26/21 130 lb (59 kg)   03/26/21 131 lb (59.4 kg)     Vitals:    04/26/21 1111   BP: 120/80   Pulse: 84   Resp: 20   Temp: 97.8 °F (36.6 °C)   TempSrc: Temporal   SpO2: 98%   Weight: 130 lb (59 kg)   Height: 5' 2\" (1.575 m)     Body mass index is 23.78 kg/m². Based upon direct observation of the patient, evaluation of cognition reveals recent and remote memory intact.     Skin: warm and dry, no rash or erythema  Head: normocephalic and atraumatic  Eyes: pupils equal, round, and reactive to light, extraocular eye movements intact, conjunctivae normal  ENT: tympanic membrane, external ear and ear canal normal bilaterally, nose without deformity, nasal mucosa and turbinates normal without polyps  Neck: supple and non-tender without mass, no thyromegaly or thyroid nodules, no cervical lymphadenopathy  Pulmonary/Chest: clear to auscultation bilaterally- no wheezes, rales or rhonchi, normal air movement, no respiratory distress  Cardiovascular: normal rate, regular rhythm, normal S1 and S2, no murmurs, rubs, clicks, or gallops, distal pulses intact, no carotid bruits  Abdomen: soft, non-tender, non-distended, normal bowel sounds, no masses or organomegaly  Extremities: no cyanosis, clubbing or edema  Musculoskeletal: normal range of motion, no joint swelling, deformity or tenderness  Neurologic: reflexes normal and symmetric, no cranial nerve deficit, gait, coordination and speech normal  General Appearance: alert and oriented to person, place and time, well-developed and well-nourished, in no acute distress  Skin: any further intervention for this issue  · No Living Will: Advance Care Planning addressed with patient today    Health Habits/Nutrition:  Health Habits/Nutrition  Do you exercise for at least 20 minutes 2-3 times per week?: Yes  Have you lost any weight without trying in the past 3 months?: No  Do you eat only one meal per day?: No  Have you seen the dentist within the past year?: (!) No  Body mass index: 23.77  Health Habits/Nutrition Interventions:  · Dental exam overdue:  patient encouraged to make appointment with his/her dentist       Personalized Preventive Plan   Current Health Maintenance Status  Immunization History   Administered Date(s) Administered    COVID-19, Moderna, PF, 100mcg/0.5mL 02/10/2021, 03/10/2021    Influenza, High Dose (Fluzone 65 yrs and older) 10/20/2014, 10/13/2015, 09/19/2016, 09/19/2017, 10/02/2018, 09/24/2019, 10/09/2020    Influenza, High-dose, Quadv, 65 yrs +, IM (Fluzone) 10/09/2020    PPD Test 01/01/1994    Pneumococcal Conjugate 13-valent ([id number]) 09/29/2017    Pneumococcal Polysaccharide ([id number]) 01/01/1992, 10/19/2012, 09/24/2019    Td (Adult), 2 Lf Tetanus Toxoid, Pf (Td, Absorbed) 06/10/2009    Zoster Live (Zostavax) 09/21/2013        Health Maintenance   Topic Date Due    DTaP/Tdap/Td vaccine (1 - Tdap) 09[birth year]19/1956    Shingles Vaccine (2 of 3) 11/16/2013    Annual Wellness Visit (AWV)  Never done    DEXA (modify frequency per FRAX score)  Completed    Flu vaccine  Completed    Pneumococcal 65+ years Vaccine  Completed    COVID-19 Vaccine  Completed    Hepatitis A vaccine  Aged Out    Hepatitis B vaccine  Aged Out    Hib vaccine  Aged Out    Meningococcal (ACWY) vaccine  Aged Out     Recommendations for [place] Due: see orders and patient instructions/AVS.  .   Recommended screening schedule for the next 5-10 years is provided to the patient in written form: see Patient Stephanie Heck was seen today for medicare awv.    Diagnoses and all orders for this visit:    Routine general medical examination at a health care facility    Essential hypertension    Thyroid disease    Anxiety    Mixed hyperlipidemia    Unspecified disorder of eye and adnexa    Arthritis b/l lower extremity weakness

## 2021-06-12 NOTE — ED ADULT NURSE NOTE - OBJECTIVE STATEMENT
45 y.o F A&Ox3 with PMH of GERD, HPV, ovarian cyst, fibromyalgia, insomnia, h pylori ulcer, presents to the ED c.o weakness and acid reflux. Pt. reports she has been feeling weak for the last couple of weeks and has been following up with her PCP. Pt. reports she saw PCP yesterday and was told she is bradycardic and advised her to come to the ED for further evaluation. Pt. also endorses acid reflux worsening since June. States symptoms are worse at night and burning sensation in epigastrium and throat wake her up from her sleep. Reports she is been taking pepcid and recently prescribed omeprazole however has not started it yet due to her h pylori test scheduled. Pt. HR trending in the 40s-50s - MD aware at bedside. EKG done and pt. placed on CM- sinua bradycardia. Also endorses intermittent episodes of burning sensation. Denies fevers, chills, vision changes and SOB. IV access obtained. Safety and comfort provided.

## 2021-06-12 NOTE — ED PROVIDER NOTE - NSFOLLOWUPCLINICS_GEN_ALL_ED_FT
[facility]  [address] [place] [address]  Phone: [phone]  Fax:   Follow Up Time: 7-10 Days

## 2021-06-12 NOTE — ED PROVIDER NOTE - NSFOLLOWUPINSTRUCTIONS_ED_ALL_ED_FT
You were evaluated for weakness.  We obtained a MRI of your spine to rule out compression of your spinal cord.  Your MRI did not show any signs of spinal cord compression but did show disc bulges in the L3-S1 region.  You can follow up with your primary care doctor for a referral for physical therapy.  If you do not have a primary care doctor you can follow at one our internal medicine clinics.  We have included the phone number to make an appointment.

## 2021-06-12 NOTE — ED PROVIDER NOTE - NS ED ROS FT
CONSTITUTIONAL: + weakness, No fever, no chills  EYES: No eye pain, no visual disturbance  Mouth: no pain in mouth, no cuts  RESPIRATORY: No cough, No sob  CARDIOVASCULAR: No CP, no palpitations  GASTROINTESTINAL: + abdominal pain, no n/v/d  GENITOURINARY: No dysuria, no hematuria  NEUROLOGICAL: No headaches, no weakness  SKIN: No itching, no rashes  MUSCULOSKELETAL: No joint pain, no joint swelling  PSYCHIATRY: no insomnia, no irritability

## 2021-06-12 NOTE — CONSULT NOTE ADULT - SUBJECTIVE AND OBJECTIVE BOX
HPI:  46 y/o Yi speaking woman from [facility] with PMHx GERD (dx 06/2020), hx of treated H. pylori infection, lactose intolerance, chronic constipation s/p EGD (9/2020)/colonoscopy (10/2020), fibromyalgia, HPV, complex ovarian cyst (follows with GYN) p/w b/l leg weakness x 3 weeks for which Neurology has been consulted for. She reports holding onto object to prevent falling due to leg weakness and has a feeling of lightheadedness. In addition, she also endorses feeling nauseous and LLQ abdominal pain and feels bloated. Of note, pt has had similar abdominal symptoms since 2016 with negative GI workup. She recently had a GI follow up on 6/8/21 and was advised to eat small portions of food and continue an 8-week trial of PPI for GERD. Testing for H. Pylori was also ordered. Yesterday, she went to her PCP and was and was told that she has bradycardia. She presented to [facility] ED on 6/5/21 with throat pain that was thought to be related to GERD, and was discharged with GI and ENT followups. Pt denies fevers, chills, SOB, cough, vomiting, and chest pain. She denies fall, trauma to back, groin numbness, urinary incontinence. She endorses episode of increased urinary frequency and dysuria.    MEDICATIONS  (STANDING):    MEDICATIONS  (PRN):    PAST MEDICAL & SURGICAL HISTORY:  H pylori ulcer  Insomnia  Fibromyalgia  HPV (human papilloma virus) infection  Complex ovarian cyst  GERD (gastroesophageal reflux disease)  H/O oral surgery  S/P endoscopy    FAMILY HISTORY:  Family history of cervical cancer (Grandparent)  Family history of uterine cancer (Grandparent)  Family history of bone cancer (Aunt)  Family history of skin cancer (Aunt)    Allergies  latex (Rash (Mild to Mod); Urticaria (Mild to Mod))    Intolerances    SHx - No smoking, No ETOH, No drug abuse    Review of Systems:  CONSTITUTIONAL: No fevers, chills  HEENT:  No visual loss, blurred vision, double vision.  No hearing loss, sneezing, congestion, runny nose or sore throat.  SKIN:  No rash or itching.  CARDIOVASCULAR:  No chest pain, chest pressure or chest discomfort. No palpitations or edema.  RESPIRATORY:  No shortness of breath, cough or sputum.  GASTROINTESTINAL: +LLQ pain, consiptation; no anorexia, nausea, vomiting or diarrhea   GENITOURINARY:  +Dysuria, + increased frequency.   NEUROLOGICAL:  See HPI  MUSCULOSKELETAL:  No muscle, back pain, joint pain or stiffness.  HEMATOLOGIC:  No anemia, bleeding or bruising.    Vital Signs Last 24 Hrs  T(C): 36.7 (12 Jun 2021 10:00), Max: 36.8 (12 Jun 2021 08:27)  T(F): 98 (12 Jun 2021 10:00), Max: 98.2 (12 Jun 2021 08:27)  HR: 51 (12 Jun 2021 10:00) (51 - 61)  BP: 155/82 (12 Jun 2021 10:00) (152/92 - 155/82)  BP(mean): --  RR: 15 (12 Jun 2021 10:00) (15 - 18)  SpO2: 100% (12 Jun 2021 10:00) (99% - 100%)    PHYSICAL EXAM:  GENERAL: NAD  HEENT: Normocephalic;  conjunctivae and sclerae clear; moist mucous membranes   NECK: Supple  EXTREMITIES: No cyanosis; no edema; no calf tenderness  SKIN: Warm and dry; no rash             Neurological Exam:       Other:                Radiology                 HPI:  46 y/o right-handed woman from [facility] (pt declines , speaks English) with PMHx GERD (dx 06/2020), hx of treated H. pylori infection, lactose intolerance, chronic constipation s/p EGD (9/2020)/colonoscopy (10/2020), fibromyalgia, HPV, complex ovarian cyst (follows with GYN) presents with bilateral lower extremity weakness x 3 weeks for which Neurology has been consulted for. She was seen at her PCP's office on 6/11 and was found to have bradycardia so was referred to the ED for further workup as she also has been feeling lightheaded. She reports holding onto objects to prevent from falling due to leg weakness. She admits to increased stress and insomnia, left home and is living with another family member. Previously followed with [facility] for anxiety. Pt also endorses feeling nauseous and has LLQ abdominal pain related to constipation Of note, pt has had similar abdominal symptoms since 2016 with negative GI workup. She recently had a GI follow up on 6/8/21 and was advised to eat small portions of food and continue an 8-week trial of PPI for GERD. Testing for H. Pylori was also ordered. Yesterday, she went to her PCP and was and was told that she has bradycardia. She presented to [facility] ED on 6/5/21 with throat pain that was thought to be related to GERD, and was discharged with GI and ENT followups. Pt denies fevers, chills, SOB, cough, vomiting, and chest pain. She denies fall, trauma to back, groin numbness, urinary incontinence. She endorses episode of increased urinary frequency and dysuria. Pt states she was told she has fibromyalgia. She is not currently on medications for pain or anxiety/mood disorder.    MEDICATIONS  (STANDING):    MEDICATIONS  (PRN):    PAST MEDICAL & SURGICAL HISTORY:  H pylori ulcer  Insomnia  Fibromyalgia  HPV (human papilloma virus) infection  Complex ovarian cyst  GERD (gastroesophageal reflux disease)  H/O oral surgery  S/P endoscopy    FAMILY HISTORY:  Family history of cervical cancer (Grandparent)  Family history of uterine cancer (Grandparent)  Family history of bone cancer (Aunt)  Family history of skin cancer (Aunt)    Allergies  latex (Rash (Mild to Mod); Urticaria (Mild to Mod))    Intolerances    SHx - No smoking, No ETOH, No drug abuse    Review of Systems:  CONSTITUTIONAL: No fevers, chills  HEENT:  No visual loss, blurred vision, double vision.  No hearing loss, sneezing, congestion, runny nose or sore throat.  SKIN:  No rash or itching.  CARDIOVASCULAR:  No chest pain, chest pressure or chest discomfort. No palpitations or edema.  RESPIRATORY:  No shortness of breath, cough or sputum.  GASTROINTESTINAL: +LLQ pain, consiptation; no anorexia, nausea, vomiting or diarrhea   GENITOURINARY:  +Dysuria, + increased frequency.   NEUROLOGICAL:  See HPI  MUSCULOSKELETAL:  No muscle, back pain, joint pain or stiffness.  HEMATOLOGIC:  No anemia, bleeding or bruising.    Vital Signs Last 24 Hrs  T(C): 36.7 (12 Jun 2021 10:00), Max: 36.8 (12 Jun 2021 08:27)  T(F): 98 (12 Jun 2021 10:00), Max: 98.2 (12 Jun 2021 08:27)  HR: 51 (12 Jun 2021 10:00) (51 - 61)  BP: 155/82 (12 Jun 2021 10:00) (152/92 - 155/82)  BP(mean): --  RR: 15 (12 Jun 2021 10:00) (15 - 18)  SpO2: 100% (12 Jun 2021 10:00) (99% - 100%)    PHYSICAL EXAM:  GENERAL: NAD  HEENT: Normocephalic;  conjunctivae and sclerae clear; moist mucous membranes   NECK: Supple  EXTREMITIES: No cyanosis; no edema; no calf tenderness  SKIN: Warm and dry; no rash             Neurological Exam:       Other:                Radiology                 HPI:  46 y/o right-handed woman from [facility] (pt declines , speaks English) with PMHx GERD (dx 06/2020), hx of treated H. pylori infection, lactose intolerance, chronic constipation s/p EGD (9/2020)/colonoscopy (10/2020), fibromyalgia, HPV, complex ovarian cyst (follows with GYN) presents with bilateral lower extremity weakness x 3 weeks for which Neurology has been consulted for. She was seen at her PCP's office on 6/11 and was found to have bradycardia so was referred to the ED for further workup as she also has been feeling lightheaded. She reports holding onto objects to prevent from falling due to leg weakness. She admits to increased stress and insomnia, left home and is living with another family member. Previously followed with [facility] for anxiety. Pt also endorses feeling nauseous and has LLQ abdominal pain related to constipation Of note, pt has had similar abdominal symptoms since 2016 with negative GI workup. She recently had a GI follow up on 6/8/21 and was advised to eat small portions of food and continue an 8-week trial of PPI for GERD. Testing for H. Pylori was also ordered. Yesterday, she went to her PCP and was and was told that she has bradycardia. She presented to [facility] ED on 6/5/21 with throat pain that was thought to be related to GERD, and was discharged with GI and ENT followups. Pt denies fevers, chills, SOB, cough, vomiting, and chest pain. She denies fall, trauma to back, groin numbness, urinary incontinence. She endorses episode of increased urinary frequency and dysuria. Pt states she was told she has fibromyalgia. She is not currently on medications for pain or anxiety/mood disorder.    MEDICATIONS  (STANDING):    MEDICATIONS  (PRN):    PAST MEDICAL & SURGICAL HISTORY:  H pylori ulcer  Insomnia  Fibromyalgia  HPV (human papilloma virus) infection  Complex ovarian cyst  GERD (gastroesophageal reflux disease)  H/O oral surgery  S/P endoscopy    FAMILY HISTORY:  Family history of cervical cancer (Grandparent)  Family history of uterine cancer (Grandparent)  Family history of bone cancer (Aunt)  Family history of skin cancer (Aunt)    Allergies  latex (Rash (Mild to Mod); Urticaria (Mild to Mod))    Intolerances    SHx - No smoking, No ETOH, No drug abuse    Review of Systems:  CONSTITUTIONAL: No fevers, chills  HEENT:  No visual loss, blurred vision, double vision.  No hearing loss, sneezing, congestion, runny nose or sore throat.  SKIN:  No rash or itching.  CARDIOVASCULAR:  No chest pain, chest pressure or chest discomfort. No palpitations or edema.  RESPIRATORY:  No shortness of breath, cough or sputum.  GASTROINTESTINAL: +LLQ pain, consiptation; no anorexia, nausea, vomiting or diarrhea   GENITOURINARY:  +Dysuria, + increased frequency.   NEUROLOGICAL:  See HPI  MUSCULOSKELETAL:  No muscle, back pain, joint pain or stiffness.  HEMATOLOGIC:  No anemia, bleeding or bruising.    Vital Signs Last 24 Hrs  T(C): 36.7 (12 Jun 2021 10:00), Max: 36.8 (12 Jun 2021 08:27)  T(F): 98 (12 Jun 2021 10:00), Max: 98.2 (12 Jun 2021 08:27)  HR: 51 (12 Jun 2021 10:00) (51 - 61)  BP: 155/82 (12 Jun 2021 10:00) (152/92 - 155/82)  BP(mean): --  RR: 15 (12 Jun 2021 10:00) (15 - 18)  SpO2: 100% (12 Jun 2021 10:00) (99% - 100%)    PHYSICAL EXAM:  GENERAL: NAD  HEENT: Normocephalic;  conjunctivae and sclerae clear; moist mucous membranes   NECK: Supple  EXTREMITIES: No cyanosis; no edema; no calf tenderness  SKIN: Warm and dry; no rash             Neurological Exam:    - Mental Status:  Alert, oriented to person, place, time, president, situation; no dysarthria; speech is fluent with intact naming, repetition, and comprehension; follows crossed commands  - Cranial Nerves II-XII:    II:  PERRL 4mm b/l; visual fields are full to confrontation  III, IV, VI:  EOMI, no nystagmus  V:  facial sensation is intact in the V1-V3 distribution bilaterally.  VII:  face is symmetric with normal eye closure and smile  VIII:  hearing is intact to finger rub  IX, X:  uvula is midline and soft palate rises symmetrically  XI:  head turning and shoulder shrug are intact bilaterally  XII:  tongue protrudes in the midline  - Motor:  strength is 5/5 RUE and LUE; RLE and LLE were initially minimally anti-gravity, however with encouragement, hip flexors 4-/5 (though still effort-dependent), knee flexion/extension 4+/5, and plantar/dosiflexion 5/5 bilaterally; normal muscle bulk and tone throughout; no pronator drift  - Reflexes:  2+ and symmetric at the biceps, triceps, brachioradialis, knees, and ankles; no ankle clonus; plantar reflexes downgoing bilaterally  - Sensory:  intact to light touch and symmetric throughout  - Coordination:  finger-nose-finger and heel-knee-shin intact without dysmetria; rapid alternating hand movements intact  - Gait: small steps, normal stance, caution with taking steps    Other:                        12.7   4.74  )-----------( 228      ( 12 Jun 2021 10:25 )             39.1               Radiology                 HPI:  46 y/o right-handed woman from [facility] (pt declines , speaks English) with PMHx GERD (dx 06/2020), hx of treated H. pylori infection, lactose intolerance, chronic constipation s/p EGD (9/2020)/colonoscopy (10/2020), fibromyalgia, HPV, complex ovarian cyst (follows with GYN) presents with bilateral lower extremity weakness x 3 weeks for which Neurology has been consulted for. She was seen at her PCP's office on 6/11 and was found to have bradycardia so was referred to the ED for further workup as she also has been feeling lightheaded. She reports holding onto objects to prevent from falling due to leg weakness. She admits to increased stress and insomnia, left home and is living with another family member. Previously followed with [facility] for anxiety. Pt also endorses feeling nauseous and has LLQ abdominal pain related to constipation Of note, pt has had similar abdominal symptoms since 2016 with negative GI workup. She recently had a GI follow up on 6/8/21 and was advised to eat small portions of food and continue an 8-week trial of PPI for GERD. Testing for H. Pylori was also ordered. Yesterday, she went to her PCP and was and was told that she has bradycardia. She presented to [facility] ED on 6/5/21 with throat pain that was thought to be related to GERD, and was discharged with GI and ENT followups. Pt denies fevers, chills, SOB, cough, vomiting, and chest pain. She denies fall, trauma to back, groin numbness, urinary incontinence. She endorses episode of increased urinary frequency and dysuria. Pt states she was told she has fibromyalgia. She is not currently on medications for pain or anxiety/mood disorder.    MEDICATIONS  (STANDING):    MEDICATIONS  (PRN):    PAST MEDICAL & SURGICAL HISTORY:  H pylori ulcer  Insomnia  Fibromyalgia  HPV (human papilloma virus) infection  Complex ovarian cyst  GERD (gastroesophageal reflux disease)  H/O oral surgery  S/P endoscopy    FAMILY HISTORY:  Family history of cervical cancer (Grandparent)  Family history of uterine cancer (Grandparent)  Family history of bone cancer (Aunt)  Family history of skin cancer (Aunt)    Allergies  latex (Rash (Mild to Mod); Urticaria (Mild to Mod))    Intolerances    SHx - No smoking, No ETOH, No drug abuse    Review of Systems:  CONSTITUTIONAL: No fevers, chills  HEENT:  No visual loss, blurred vision, double vision.  No hearing loss, sneezing, congestion, runny nose or sore throat.  SKIN:  No rash or itching.  CARDIOVASCULAR:  No chest pain, chest pressure or chest discomfort. No palpitations or edema.  RESPIRATORY:  No shortness of breath, cough or sputum.  GASTROINTESTINAL: +LLQ pain, consiptation; no anorexia, nausea, vomiting or diarrhea   GENITOURINARY:  +Dysuria, + increased frequency.   NEUROLOGICAL:  See HPI  MUSCULOSKELETAL:  No muscle, back pain, joint pain or stiffness.  HEMATOLOGIC:  No anemia, bleeding or bruising.    Vital Signs Last 24 Hrs  T(C): 36.7 (12 Jun 2021 10:00), Max: 36.8 (12 Jun 2021 08:27)  T(F): 98 (12 Jun 2021 10:00), Max: 98.2 (12 Jun 2021 08:27)  HR: 51 (12 Jun 2021 10:00) (51 - 61)  BP: 155/82 (12 Jun 2021 10:00) (152/92 - 155/82)  BP(mean): --  RR: 15 (12 Jun 2021 10:00) (15 - 18)  SpO2: 100% (12 Jun 2021 10:00) (99% - 100%)    PHYSICAL EXAM:  GENERAL: NAD  HEENT: Normocephalic;  conjunctivae and sclerae clear; moist mucous membranes   NECK: Supple  EXTREMITIES: No cyanosis; no edema; no calf tenderness  SKIN: Warm and dry; no rash             Neurological Exam:    - Mental Status:  Alert, oriented to person, place, time, president, situation; no dysarthria; speech is fluent with intact naming, repetition, and comprehension; follows crossed commands  - Cranial Nerves II-XII:    II:  PERRL 4mm b/l; visual fields are full to confrontation  III, IV, VI:  EOMI, no nystagmus  V:  facial sensation is intact in the V1-V3 distribution bilaterally.  VII:  face is symmetric with normal eye closure and smile  VIII:  hearing is intact to finger rub  IX, X:  uvula is midline and soft palate rises symmetrically  XI:  head turning and shoulder shrug are intact bilaterally  XII:  tongue protrudes in the midline  - Motor:  strength is 5/5 RUE and LUE; RLE and LLE were initially minimally anti-gravity, however with encouragement, hip flexors 4-/5 (though still effort-dependent), knee flexion/extension 4+/5, and plantar/dosiflexion 5/5 bilaterally; normal muscle bulk and tone throughout; no pronator drift  - Reflexes:  2+ and symmetric at the biceps, triceps, brachioradialis, knees, and ankles; no ankle clonus; plantar reflexes downgoing bilaterally  - Sensory:  intact to light touch and symmetric throughout  - Coordination:  finger-nose-finger and heel-knee-shin intact without dysmetria; rapid alternating hand movements intact  - Gait: small steps, normal stance, caution with taking steps    Other:                        12.7   4.74  )-----------( 228      ( 12 Jun 2021 10:25 )             39.1       06-12    141  |  107  |  8   ----------------------------<  69<L>  4.0   |  24  |  0.70    Ca    9.2      12 Jun 2021 12:51    TPro  7.6  /  Alb  4.4  /  TBili  0.3  /  DBili  x   /  AST  31  /  ALT  22  /  AlkPhos  49  06-12    Radiology  MR Lumbar Spine No Cont (06.12.21 @ 16:00) >  L3/L4: Mild broad disc bulge with slight effacement of the ventral thecal sac. No neural foraminal narrowing.    L4/L5: Mild broad disc bulge. No central canal narrowing or neural foraminal narrowing.    L5/S1: Mild broad disc bulge. No central canal narrowing or neural foraminal narrowing.    No cord compression.

## 2021-06-12 NOTE — ED PROVIDER NOTE - OBJECTIVE STATEMENT
Shant PI# [id number]    45F with hx of GERD p/w weakness, x 3 weeks. She feels that she will pass out. Yesterday, she went to her PCP and was and was told that she has bradycardia. She presented to the ED last week with throat pain. She denies SOB and chest pain. She is finding herself holding onto object to prevent falling. [place] PI# [id number]    45F with hx of GERD p/w b/l leg weakness, x 3 weeks. She feels that she will pass out. In addition, she also endorses feeling nauseous and LLQ abdominal pain. Yesterday, she went to her PCP and was and was told that she has bradycardia. She presented to the ED last week with throat pain. She denies SOB, cough, vomiting, and chest pain. She is finding herself holding onto object to prevent falling due to leg weakness. She endorses good appetite. Endorses 9lbs since last year. Patient recently visited a GI doctor who advised her to eat small portion and recommended FODMAP diet. She had been feeling weakness few weeks ago and thought it was 2/2 insomnia. She takes honey at night to lower her acid reflux symptoms. She has been taking famotidine 20 mg two times a day and melatonin. This week she was prescribed omeprazole 40 mg. H. pylori test pending c/w famotidine. She is born in [facility] years. No allergies. Second ahnd smoking. father has gastric ulcer. [place] PI# [id number]    45F with hx of GERD (dx 06/2020) p/w b/l leg weakness, x 3 weeks. She feels that she will pass out. In addition, she also endorses feeling nauseous and LLQ abdominal pain and feels bloated. Yesterday, she went to her PCP and was and was told that she has bradycardia. She presented to the ED last week with throat pain. She denies SOB, cough, vomiting, and chest pain. She is finding herself holding onto object to prevent falling due to leg weakness. She endorses good appetite. Endorses 9lbs since last year. Patient recently visited a GI doctor who advised her to eat small portion and recommended FODMAP diet. She had been feeling weakness few weeks ago and thought it was 2/2 insomnia. She takes honey at night to lower her acid reflux symptoms. She has been taking famotidine 20 mg two times a day and melatonin. This week she was prescribed omeprazole 40 mg. H. pylori test pending c/w famotidine. She is born in [facility] 15 years. No allergies. Second ahnd smoking. father has gastric ulcer. [place] PI# [id number]    45F with hx of GERD (dx 06/2020) p/w b/l leg weakness, x 3 weeks. She feels that she will pass out. In addition, she also endorses urinary incontinence at baseline, feeling nauseous and LLQ abdominal pain and feels bloated. Yesterday, she went to her PCP and was and was told that she has bradycardia. She presented to the ED last week with throat pain. She denies SOB, cough, vomiting, and chest pain. She is finding herself holding onto object to prevent falling due to leg weakness. She endorses good appetite. Endorses 9lbs since last year. Patient recently visited a GI doctor who advised her to eat small portion and recommended FODMAP diet. She had been feeling weakness few weeks ago and thought it was 2/2 insomnia. She takes honey at night to lower her acid reflux symptoms. She has been taking famotidine 20 mg two times a day and melatonin. This week she was prescribed omeprazole 40 mg. H. pylori test pending c/w famotidine. She is born in [facility] years. No allergies. Second abd smoking. Her father has gastric ulcer. Denies getting flu vaccination recently. Had COVID vaccination last month.

## 2021-06-13 LAB — T PALLIDUM AB TITR SER: NEGATIVE — SIGNIFICANT CHANGE UP

## 2021-06-14 ENCOUNTER — NON-APPOINTMENT (OUTPATIENT)
Age: 46
End: 2021-06-14

## 2021-06-14 NOTE — ASSESSMENT
[FreeTextEntry1] : 46 y/o female with pmh of H. pylori, lactose intolerance, chronic constipation, functional dyspepsia,and fibormyalgia who presents in clinic for follow up of throat pain.

## 2021-06-14 NOTE — PLAN
[FreeTextEntry1] : \par # Throat discomfort\par Likely secondary to GERD sxs of acid reflux, sour taste in mouth, and regurgitation of food\par Saw GI on 6/8/21, recommended to start omeprazole for an 8 week trial after hydrogen breath test performed\par ENT appointment scheduled for 7/19/21 (may or may not need CT imaging of neck depending on her improvement of symptoms)\par Educated regarding eating small frequent meals to help with her symptoms\par \par F/u in medicine clinic in 10 weeks for follow up throat discomfort\par \par Case discussed with Dr. Walden\par \par Odilia Conteh MD, PGY1\par Internal Medicine

## 2021-06-14 NOTE — REVIEW OF SYSTEMS
[Fatigue] : fatigue [Earache] : earache [Sore Throat] : sore throat [Fever] : no fever [Chills] : no chills [Discharge] : no discharge [Chest Pain] : no chest pain [Palpitations] : no palpitations [Lower Ext Edema] : no lower extremity edema [Shortness Of Breath] : no shortness of breath [Cough] : no cough [Abdominal Pain] : no abdominal pain [Nausea] : no nausea [Diarrhea] : diarrhea [Vomiting] : no vomiting

## 2021-06-14 NOTE — PHYSICAL EXAM
[No Acute Distress] : no acute distress [Well Nourished] : well nourished [Well Developed] : well developed [Well-Appearing] : well-appearing [Normal Sclera/Conjunctiva] : normal sclera/conjunctiva [Normal TMs] : both tympanic membranes were normal [Normal Nasal Mucosa] : the nasal mucosa was normal [No Respiratory Distress] : no respiratory distress  [No Accessory Muscle Use] : no accessory muscle use [Clear to Auscultation] : lungs were clear to auscultation bilaterally [Normal Rate] : normal rate  [Regular Rhythm] : with a regular rhythm [Normal S1, S2] : normal S1 and S2 [No Murmur] : no murmur heard [Soft] : abdomen soft [Non Tender] : non-tender [Non-distended] : non-distended [No Masses] : no abdominal mass palpated [No HSM] : no HSM [Normal Bowel Sounds] : normal bowel sounds [de-identified] : Bilateral mildly enlarged adenoid tonsils, no pus-like drainage noted. Oropharynx with mild erythemia.

## 2021-06-14 NOTE — HISTORY OF PRESENT ILLNESS
[FreeTextEntry1] : Follow up for throat pain [de-identified] : 46 y/o female with pmh of H. pylori, lactose intolerance, chronic constipation, functional dyspepsia,and fibormyalgia who presents in clinic for follow up of throat pain.\par Patient was recently seen in [facility] ED for throat pain, was seen by GI, recommended Pepcid BID, and as per ED was recommended to see ENT within the next few days.\par The patient states that since 1/2021 she has been experiencing throat discomfort. Initially she saw the PCP and thought it was 2/2 to having her heater on next to her bed at night given her increased nasal secretions, however even with taking off her heater, her symptoms remained present. Had COVID approximately late March-early April, however these symptoms were prior to this, and her symptoms from COVID at the time were: fever x 8-9 days, headache, body pain, and loss of taste and smell.\par Most recently she has been taking Pepcid, however without any relief. Patient endorses that her throat pain improves when she eats food. Patient endorses acid reflux and regurgitation with a sour taste in her mouth. Denies dysphagia or odynophagia and no difficulty breathing. No changes in medications prior to this throat discomfort. No fevers, chills, sick contacts, or recent travel. \par She ws seen by GI on 6/8/21 and was recommended to start on a PPI therapy for 8 weeks. Pt has not started this as she awaits a hydrogen breath test, she has only been taking famotidine.\par

## 2021-06-19 LAB — B BURGDOR DNA SPEC QL NAA+PROBE: NEGATIVE — SIGNIFICANT CHANGE UP
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
## 2021-06-24 ENCOUNTER — APPOINTMENT (OUTPATIENT)
Dept: GASTROENTEROLOGY | Facility: CLINIC | Age: 46
End: 2021-06-24
Payer: COMMERCIAL

## 2021-06-24 PROCEDURE — 91065 BREATH HYDROGEN/METHANE TEST: CPT

## 2021-07-19 ENCOUNTER — APPOINTMENT (OUTPATIENT)
Dept: OTOLARYNGOLOGY | Facility: CLINIC | Age: 46
End: 2021-07-19
Payer: COMMERCIAL

## 2021-07-19 ENCOUNTER — OUTPATIENT (OUTPATIENT)
Dept: OUTPATIENT SERVICES | Facility: HOSPITAL | Age: 46
LOS: 1 days | Discharge: HOME | End: 2021-07-19
Payer: MEDICAID

## 2021-07-19 VITALS
WEIGHT: 98 LBS | DIASTOLIC BLOOD PRESSURE: 67 MMHG | HEART RATE: 77 BPM | SYSTOLIC BLOOD PRESSURE: 105 MMHG | BODY MASS INDEX: 19.79 KG/M2

## 2021-07-19 DIAGNOSIS — Z98.890 OTHER SPECIFIED POSTPROCEDURAL STATES: Chronic | ICD-10-CM

## 2021-07-19 PROCEDURE — 31575 DIAGNOSTIC LARYNGOSCOPY: CPT

## 2021-07-19 PROCEDURE — 99204 OFFICE O/P NEW MOD 45 MIN: CPT | Mod: 25

## 2021-07-19 RX ORDER — MAGNESIUM OXIDE/MAG AA CHELATE 300 MG
CAPSULE ORAL
Refills: 0 | Status: ACTIVE | COMMUNITY

## 2021-07-19 NOTE — HISTORY OF PRESENT ILLNESS
[de-identified] : 45 year old female presents for throat pain since 02/2021 Pt states that she has a hx of H. Pylori, treated in 2016. Pt describes pain in the throat and sensation of "emptiness in the throat that started in 2020. The pain went away but then reappeared Feb of this year. Describes the pain in the throat as burning in the throat and chest. Pt underwent endoscopy which showed gastritis. Pt complaining of dry feeling in the throat in the morning along with globus sensation and irritation in the throat all of which is worse in the morning. States that her throat is always dry and scratchy. Also complaining of pain in the ears as well. On pepcid currently, and was put on omeprazole 40 mg for 12 days but stopped after the patient thought that the medication did not help her sx. Pt being followed by GI for gastritis and GERD. Pt was recommended diet changes and states that she has partially stopped eating the foods that were recommended as it is too difficult to adhere to the diet fully. Pt eats chocolate at home. Pt does not drink coffee or alcohol.

## 2021-07-20 ENCOUNTER — APPOINTMENT (OUTPATIENT)
Dept: OPHTHALMOLOGY | Facility: CLINIC | Age: 46
End: 2021-07-20

## 2021-07-26 ENCOUNTER — TRANSCRIPTION ENCOUNTER (OUTPATIENT)
Age: 46
End: 2021-07-26

## 2021-08-02 DIAGNOSIS — K21.9 GASTRO-ESOPHAGEAL REFLUX DISEASE WITHOUT ESOPHAGITIS: ICD-10-CM

## 2021-08-02 DIAGNOSIS — R07.0 PAIN IN THROAT: ICD-10-CM

## 2021-08-03 ENCOUNTER — APPOINTMENT (OUTPATIENT)
Dept: GASTROENTEROLOGY | Facility: HOSPITAL | Age: 46
End: 2021-08-03

## 2021-08-03 ENCOUNTER — RESULT REVIEW (OUTPATIENT)
Age: 46
End: 2021-08-03

## 2021-08-03 ENCOUNTER — OUTPATIENT (OUTPATIENT)
Dept: OUTPATIENT SERVICES | Facility: HOSPITAL | Age: 46
LOS: 1 days | End: 2021-08-03
Payer: SELF-PAY

## 2021-08-03 VITALS
HEART RATE: 75 BPM | HEIGHT: 59 IN | WEIGHT: 99 LBS | DIASTOLIC BLOOD PRESSURE: 67 MMHG | SYSTOLIC BLOOD PRESSURE: 100 MMHG | TEMPERATURE: 97.3 F | BODY MASS INDEX: 19.96 KG/M2

## 2021-08-03 DIAGNOSIS — Z98.890 OTHER SPECIFIED POSTPROCEDURAL STATES: Chronic | ICD-10-CM

## 2021-08-03 DIAGNOSIS — R10.9 UNSPECIFIED ABDOMINAL PAIN: ICD-10-CM

## 2021-08-03 PROCEDURE — G0463: CPT

## 2021-08-03 NOTE — END OF VISIT
[] : Fellow [FreeTextEntry3] : Patient seen and examined, agree with above. Plan for Sitz marker study and anorectal manometry to evaluate for colonic inertia vs dyssynergic defecation. [Time Spent: ___ minutes] : I have spent [unfilled] minutes of time on the encounter.

## 2021-08-03 NOTE — PHYSICAL EXAM
[General Appearance - Alert] : alert [General Appearance - In No Acute Distress] : in no acute distress [Sclera] : the sclera and conjunctiva were normal [Extraocular Movements] : extraocular movements were intact [Outer Ear] : the ears and nose were normal in appearance [Hearing Threshold Finger Rub Not [place]] : hearing was normal [Neck Appearance] : the appearance of the neck was normal [Exaggerated Use Of Accessory Muscles For Inspiration] : no accessory muscle use [Heart Rate And Rhythm] : heart rate was normal and rhythm regular [Heart Sounds] : normal S1 and S2 [] : no hepato-splenomegaly [Skin Turgor] : normal skin turgor [Skin Lesions] : no skin lesions [Motor Exam] : the motor exam was normal [No Focal Deficits] : no focal deficits [Oriented To Time, Place, And Person] : oriented to person, place, and time [Impaired Insight] : insight and judgment were intact [FreeTextEntry1] : distended; mild tenderness to palpation of left flank/abdomen

## 2021-08-03 NOTE — HISTORY OF PRESENT ILLNESS
[Nausea] : denies nausea [Vomiting] : denies vomiting [Diarrhea] : denies diarrhea [Yellow Skin Or Eyes (Jaundice)] : denies jaundice [Abdominal Pain] : denies abdominal pain [Abdominal Swelling] : denies abdominal swelling [Rectal Pain] : denies rectal pain [Irritable Bowel Syndrome] : irritable bowel syndrome [Heartburn] : stable heartburn [Constipation] : constipation worsened [Wt Loss ___ Lbs] : recent [unfilled] ~Upound(s) weight loss [Peptic Ulcer Disease] : no peptic ulcer disease [Pancreatitis] : no pancreatitis [Cholelithiasis] : no cholelithiasis [Inflammatory Bowel Disease] : no inflammatory bowel disease [Diverticulitis] : no diverticulitis [de-identified] : 45 year old woman with h/o treated H. pylori infection, lactose intolerance, chronic constipation, fibromyalgia and functional dyspepsia who presents for follow up abdominal pain, constipation and functional dyspepsia.\par \par Since last visit, patient states her constipation has worsened, and states she has bloating on only ~3 times weekly on psyllium and magnesium citrate.  She describes her bowel movements as being "incomplete", and that she still has sensation of needing a bowel movement afterwards. Denies having to self disimpact or pull stools out with stool. No blood or black in the stool. She states "miralax does not work on her."  She states she drinks "a ton" of water.  She has continued to cut out citrus foods for her low FODMAP diet.  She continues to have LLQ and left flank pain.  She was unable to afford Linzess in the past, tried 4 pills of samples. Also reports urinary urgency and has been seeing urology.\par \par Recent TSH within normal limits.\par Colonoscopy 10/2020: Normal.\par EGD 09/2020: Normal. Gastric biopsies negative for HP and IM.

## 2021-08-03 NOTE — ASSESSMENT
[FreeTextEntry1] : Ms. Arce is a 45 year old female with history of fibromyalgia, chronic constipation, bloating, and left sided abdominal pain.  She has had a fairly extensive workup with negative TSH, EGD, colonoscopy, and SIBO testing.  She has self-discontinued Linzess [due to cost] and miralax [due to "dysuria"] in the past.  Constipation and bloating persist despite adequate water intake, psyllium, and magnesium citrate.  She continues to describe constipation with "incomplete" sensation after bowel movements.  Differential diagnosis at this time most suspicious for slow transit constipation versus pelvic floor dyssynergia.\par \par Plan:\par -Recommend for work up with sitz marker study and anorectal manometry.  \par -Counseled patient to continue low FODMAP diet and adequate water intake.  \par -Re-emphasized MiraLAX should not cause dysuria, and recommended reinitiation of this medication.  In the meanwhile, continue home psyllium and magnesium citrate.

## 2021-08-04 ENCOUNTER — NON-APPOINTMENT (OUTPATIENT)
Age: 46
End: 2021-08-04

## 2021-08-04 DIAGNOSIS — K59.09 OTHER CONSTIPATION: ICD-10-CM

## 2021-08-04 DIAGNOSIS — R19.8 OTHER SPECIFIED SYMPTOMS AND SIGNS INVOLVING THE DIGESTIVE SYSTEM AND ABDOMEN: ICD-10-CM

## 2021-08-04 DIAGNOSIS — K21.9 GASTRO-ESOPHAGEAL REFLUX DISEASE WITHOUT ESOPHAGITIS: ICD-10-CM

## 2021-08-04 DIAGNOSIS — M79.7 FIBROMYALGIA: ICD-10-CM

## 2021-08-18 ENCOUNTER — NON-APPOINTMENT (OUTPATIENT)
Age: 46
End: 2021-08-18

## 2021-08-18 DIAGNOSIS — H10.13 ACUTE ATOPIC CONJUNCTIVITIS, BILATERAL: ICD-10-CM

## 2021-08-30 ENCOUNTER — OUTPATIENT (OUTPATIENT)
Dept: OUTPATIENT SERVICES | Facility: HOSPITAL | Age: 46
LOS: 1 days | End: 2021-08-30
Payer: SELF-PAY

## 2021-08-30 DIAGNOSIS — R19.8 OTHER SPECIFIED SYMPTOMS AND SIGNS INVOLVING THE DIGESTIVE SYSTEM AND ABDOMEN: ICD-10-CM

## 2021-08-30 DIAGNOSIS — R10.9 UNSPECIFIED ABDOMINAL PAIN: ICD-10-CM

## 2021-08-30 DIAGNOSIS — Z98.890 OTHER SPECIFIED POSTPROCEDURAL STATES: Chronic | ICD-10-CM

## 2021-08-30 DIAGNOSIS — M79.7 FIBROMYALGIA: ICD-10-CM

## 2021-08-30 DIAGNOSIS — K59.09 OTHER CONSTIPATION: ICD-10-CM

## 2021-08-30 DIAGNOSIS — K21.9 GASTRO-ESOPHAGEAL REFLUX DISEASE WITHOUT ESOPHAGITIS: ICD-10-CM

## 2021-08-30 PROCEDURE — 74018 RADEX ABDOMEN 1 VIEW: CPT | Mod: 26

## 2021-09-01 ENCOUNTER — RESULT REVIEW (OUTPATIENT)
Age: 46
End: 2021-09-01

## 2021-09-01 ENCOUNTER — NON-APPOINTMENT (OUTPATIENT)
Age: 46
End: 2021-09-01

## 2021-09-01 DIAGNOSIS — H00.025 HORDEOLUM INTERNUM LEFT LOWER EYELID: ICD-10-CM

## 2021-09-01 PROCEDURE — 74018 RADEX ABDOMEN 1 VIEW: CPT | Mod: 26

## 2021-09-03 ENCOUNTER — RESULT REVIEW (OUTPATIENT)
Age: 46
End: 2021-09-03

## 2021-09-03 PROCEDURE — 74018 RADEX ABDOMEN 1 VIEW: CPT

## 2021-09-03 PROCEDURE — 74018 RADEX ABDOMEN 1 VIEW: CPT | Mod: 26

## 2021-09-08 NOTE — ED ADULT NURSE NOTE - PAIN RATING/NUMBER SCALE (0-10): ACTIVITY
8 Cheek-To-Nose Interpolation Flap Text: A decision was made to reconstruct the defect utilizing an interpolation axial flap and a staged reconstruction.  A telfa template was made of the defect.  This telfa template was then used to outline the Cheek-To-Nose Interpolation flap.  The donor area for the pedicle flap was then injected with anesthesia.  The flap was excised through the skin and subcutaneous tissue down to the layer of the underlying musculature.  The interpolation flap was carefully excised within this deep plane to maintain its blood supply.  The edges of the donor site were undermined.   The donor site was closed in a primary fashion.  The pedicle was then rotated into position and sutured.  Once the tube was sutured into place, adequate blood supply was confirmed with blanching and refill.  The pedicle was then wrapped with xeroform gauze and dressed appropriately with a telfa and gauze bandage to ensure continued blood supply and protect the attached pedicle.

## 2021-09-13 ENCOUNTER — APPOINTMENT (OUTPATIENT)
Dept: OPHTHALMOLOGY | Facility: CLINIC | Age: 46
End: 2021-09-13
Payer: SELF-PAY

## 2021-09-13 ENCOUNTER — NON-APPOINTMENT (OUTPATIENT)
Age: 46
End: 2021-09-13

## 2021-09-13 PROCEDURE — 92285 EXTERNAL OCULAR PHOTOGRAPHY: CPT

## 2021-09-13 PROCEDURE — 92014 COMPRE OPH EXAM EST PT 1/>: CPT

## 2021-09-14 ENCOUNTER — NON-APPOINTMENT (OUTPATIENT)
Age: 46
End: 2021-09-14

## 2021-09-21 ENCOUNTER — OUTPATIENT (OUTPATIENT)
Dept: OUTPATIENT SERVICES | Facility: HOSPITAL | Age: 46
LOS: 1 days | End: 2021-09-21
Payer: SELF-PAY

## 2021-09-21 ENCOUNTER — APPOINTMENT (OUTPATIENT)
Dept: GASTROENTEROLOGY | Facility: HOSPITAL | Age: 46
End: 2021-09-21

## 2021-09-21 VITALS
SYSTOLIC BLOOD PRESSURE: 149 MMHG | WEIGHT: 98 LBS | BODY MASS INDEX: 19.76 KG/M2 | HEART RATE: 54 BPM | HEIGHT: 59 IN | TEMPERATURE: 97.8 F | RESPIRATION RATE: 14 BRPM | DIASTOLIC BLOOD PRESSURE: 82 MMHG

## 2021-09-21 DIAGNOSIS — Z98.890 OTHER SPECIFIED POSTPROCEDURAL STATES: Chronic | ICD-10-CM

## 2021-09-21 DIAGNOSIS — R10.9 UNSPECIFIED ABDOMINAL PAIN: ICD-10-CM

## 2021-09-21 DIAGNOSIS — R10.13 EPIGASTRIC PAIN: ICD-10-CM

## 2021-09-21 DIAGNOSIS — K59.09 OTHER CONSTIPATION: ICD-10-CM

## 2021-09-21 DIAGNOSIS — R19.8 OTHER SPECIFIED SYMPTOMS AND SIGNS INVOLVING THE DIGESTIVE SYSTEM AND ABDOMEN: ICD-10-CM

## 2021-09-21 DIAGNOSIS — R10.32 LEFT LOWER QUADRANT PAIN: ICD-10-CM

## 2021-09-21 PROCEDURE — G0463: CPT

## 2021-09-21 NOTE — REVIEW OF SYSTEMS
[Sore Throat] : sore throat [Abdominal Pain] : abdominal pain [Constipation] : constipation [Heartburn] : heartburn [Fever] : no fever [Chills] : no chills [Chest Pain] : no chest pain [Lower Ext Edema] : no lower extremity edema [Cough] : no cough [Diarrhea] : no diarrhea [Dysuria] : no dysuria [Arthralgias] : no arthralgias [Joint Swelling] : no joint swelling [Skin Lesions] : no skin lesions [Dizziness] : no dizziness [Easy Bleeding] : no tendency for easy bleeding [Easy Bruising] : no tendency for easy bruising [FreeTextEntry7] : Regurgitation

## 2021-09-21 NOTE — PHYSICAL EXAM
[General Appearance - Alert] : alert [General Appearance - Well Developed] : well developed [Sclera] : the sclera and conjunctiva were normal [Oropharynx] : the oropharynx was normal [Neck Appearance] : the appearance of the neck was normal [Respiration, Rhythm And Depth] : normal respiratory rhythm and effort [Heart Rate And Rhythm] : heart rate was normal and rhythm regular [Edema] : there was no peripheral edema [Abdomen Soft] : soft [Abdomen Mass (___ Cm)] : no abdominal mass palpated [Abdomen Hernia] : no hernia was discovered [Cervical Lymph Nodes Enlarged Posterior Bilaterally] : posterior cervical [Cervical Lymph Nodes Enlarged Anterior Bilaterally] : anterior cervical [Motor Tone] : muscle strength and tone were normal [Skin Color & Pigmentation] : normal skin color and pigmentation [] : no rash [Motor Exam] : the motor exam was normal [Oriented To Time, Place, And Person] : oriented to person, place, and time [FreeTextEntry1] : Epigastric tenderness, non-distended

## 2021-09-21 NOTE — ASSESSMENT
[FreeTextEntry1] : 45 year old female with history of fibromyalgia, chronic constipation, bloating, and left sided abdominal pain. She has had a fairly extensive workup with normal TSH, negative EGD, colonoscopy, hydrogen breath test and Sitz marker tests. She has self-discontinued Linzess [due to cost], Miralax [due to "dysuria"] and now Benefibre [due to excess bloating]. Her constipation has significantly improved on a naturopathic medication/tincture of which the ingredient list is unknown. Her reflux, bloating and abdominal pain persist despite PPI therapy, FODMAP diet and supplemental fibre. Differential diagnosis at this time most suspicious for functional gastrointestinal disorders (functional dyspepsia, esophageal hypersensitivity), and borderline slow-transit constipation vs. pelvic floor dyssnergia\par \par # Functional dyspepsia - Unresponsive to PPI therapy, FODMAP diet. SIBO, IBS-Type C\par # Esophageal hypersensitivity - No evidence of esophagitis on EGD from 2020, persistent reflux and regurgitation after dietary modifications and maximal PPI therapy.\par # Constipation - Possible borderline slow-transit constipation; not likely IBS-type C\par \par Plan:\par - start peppermint oil for bloating (advised 1-2 cups daily)\par - start low dose amitriptyline for functional GI disorders (side effects explained)\par - bring in naturopathic medication/tincture for constipation at next visit \par - stop oral PPI today since symptoms unresponsive however patient elects to continue (prescription not renewed)\par - FODMAP diet and other anti-reflux dietary modifications as tolerated\par - RTC in 4 weeks\par \par D/w Dr. Kelly\par \par GUALBERTO Moon MD\par GI Fellow\par \par

## 2021-09-21 NOTE — HISTORY OF PRESENT ILLNESS
[Heartburn] : heartburn worsened [Nausea] : denies nausea [Vomiting] : denies vomiting [Diarrhea] : denies diarrhea [Yellow Skin Or Eyes (Jaundice)] : denies jaundice [Constipation] : improvement in constipation [Abdominal Pain] : abdominal pain worsened [Abdominal Swelling] : denies abdominal swelling [Rectal Pain] : denies rectal pain [de-identified] : 46 year old woman with lactose intolerance, chronic constipation, chronic bloating, fibromyalgia and functional dyspepsia who presents for follow up of chronic GI problems.\par \par Patient returns to review results of Sitz marker study - states she could not heart results when communicated to her over phone on 9/14/21. Test was completed on 9/3/21 revealing increased stool burden but normal colonic transit. Patient taking an ayurvedic medicine prescribed to her cousin by a naturopath for constipation. Name of medication unknown but contains word "tincture." She is having BMs every other day which is an improvement. She has discontinued taking Benefiber which she states worsens her bloating as much. She still finds FODMAP diet too restrictive. \par \par She continues to report LLQ abdominal pain unchanged with BMs. After dietary modifications, she continues to report reflux with regurgitation only mildly improved with oral PPI but does not want to stop taking it. Concerned she may have internal bleeding because she noted rust-colored stool once after eating plums. \par

## 2021-09-30 ENCOUNTER — OUTPATIENT (OUTPATIENT)
Dept: OUTPATIENT SERVICES | Facility: HOSPITAL | Age: 46
LOS: 1 days | End: 2021-09-30
Payer: SELF-PAY

## 2021-09-30 ENCOUNTER — NON-APPOINTMENT (OUTPATIENT)
Age: 46
End: 2021-09-30

## 2021-09-30 ENCOUNTER — APPOINTMENT (OUTPATIENT)
Dept: INTERNAL MEDICINE | Facility: CLINIC | Age: 46
End: 2021-09-30

## 2021-09-30 VITALS
HEIGHT: 59 IN | BODY MASS INDEX: 19.15 KG/M2 | DIASTOLIC BLOOD PRESSURE: 60 MMHG | WEIGHT: 95 LBS | OXYGEN SATURATION: 99 % | HEART RATE: 64 BPM | SYSTOLIC BLOOD PRESSURE: 108 MMHG

## 2021-09-30 DIAGNOSIS — Z98.890 OTHER SPECIFIED POSTPROCEDURAL STATES: Chronic | ICD-10-CM

## 2021-09-30 DIAGNOSIS — I10 ESSENTIAL (PRIMARY) HYPERTENSION: ICD-10-CM

## 2021-09-30 PROCEDURE — G0463: CPT

## 2021-09-30 RX ORDER — OMEPRAZOLE 40 MG/1
40 CAPSULE, DELAYED RELEASE ORAL
Qty: 30 | Refills: 2 | Status: DISCONTINUED | COMMUNITY
Start: 2021-06-08 | End: 2021-09-30

## 2021-09-30 NOTE — PHYSICAL EXAM
[No Acute Distress] : no acute distress [Well Nourished] : well nourished [Well Developed] : well developed [Well-Appearing] : well-appearing [No Respiratory Distress] : no respiratory distress  [No Accessory Muscle Use] : no accessory muscle use [Clear to Auscultation] : lungs were clear to auscultation bilaterally [Normal S1, S2] : normal S1 and S2 [No Murmur] : no murmur heard [Soft] : abdomen soft [Non Tender] : non-tender [Non-distended] : non-distended [Normal Bowel Sounds] : normal bowel sounds [No Joint Swelling] : no joint swelling [Grossly Normal Strength/Tone] : grossly normal strength/tone [Normal Gait] : normal gait

## 2021-10-02 NOTE — REVIEW OF SYSTEMS
[Sore Throat] : sore throat [Abdominal Pain] : abdominal pain [Joint Pain] : joint pain [Fever] : no fever [Chills] : no chills [Chest Pain] : no chest pain [Lower Ext Edema] : no lower extremity edema [Shortness Of Breath] : no shortness of breath [Nausea] : no nausea [Constipation] : no constipation [Vomiting] : no vomiting [Melena] : no melena [Dysuria] : no dysuria

## 2021-10-02 NOTE — HISTORY OF PRESENT ILLNESS
[FreeTextEntry8] : 44 y/o female with pmh of H. pylori, lactose intolerance, chronic constipation, functional dyspepsia, chronic throat pain, and fibormyalgia who presents in clinic for follow up for "full body inflammation".  \par       Patient has sore throat for over 1 year. She saw ENT on 7/19/2021, s/p laryngoscope, showing post-cricoid and arytenoid edema and erythema consistent with diagnosis of reflux/LPR. ENT recommend patient to continue following GI for 8 weeks of PPI. Patient was noncompliant with her PPI treatment, last saw GI on 9/21/2021 and was initiated on amitriptyline which patient has not started taking due to concern for side effects. \par      Patient also complains that sometimes her knees become swollen. \par      She also has chronic abdominal pain x 6 years, with no clear etiology, despite extensive workup with GI,  negative EGD, colonoscopy, hydrogen breath test and Sitz marker tests.\par      She also has diffuse body ache. \par       She just feels her entire body is inflamed. \par \par \par

## 2021-10-04 ENCOUNTER — NON-APPOINTMENT (OUTPATIENT)
Age: 46
End: 2021-10-04

## 2021-10-04 ENCOUNTER — APPOINTMENT (OUTPATIENT)
Dept: OPHTHALMOLOGY | Facility: CLINIC | Age: 46
End: 2021-10-04
Payer: SELF-PAY

## 2021-10-04 PROCEDURE — 92012 INTRM OPH EXAM EST PATIENT: CPT

## 2021-10-07 DIAGNOSIS — R07.0 PAIN IN THROAT: ICD-10-CM

## 2021-10-07 DIAGNOSIS — R10.9 UNSPECIFIED ABDOMINAL PAIN: ICD-10-CM

## 2021-10-20 ENCOUNTER — OUTPATIENT (OUTPATIENT)
Dept: OUTPATIENT SERVICES | Facility: HOSPITAL | Age: 46
LOS: 1 days | End: 2021-10-20
Payer: MEDICARE

## 2021-10-20 DIAGNOSIS — Z98.890 OTHER SPECIFIED POSTPROCEDURAL STATES: Chronic | ICD-10-CM

## 2021-10-20 DIAGNOSIS — Z11.52 ENCOUNTER FOR SCREENING FOR COVID-19: ICD-10-CM

## 2021-10-20 PROCEDURE — U0003: CPT

## 2021-10-20 PROCEDURE — U0005: CPT

## 2021-10-20 PROCEDURE — C9803: CPT

## 2021-10-21 LAB — SARS-COV-2 RNA SPEC QL NAA+PROBE: SIGNIFICANT CHANGE UP

## 2021-10-22 ENCOUNTER — NON-APPOINTMENT (OUTPATIENT)
Age: 46
End: 2021-10-22

## 2021-11-09 ENCOUNTER — APPOINTMENT (OUTPATIENT)
Dept: GASTROENTEROLOGY | Facility: HOSPITAL | Age: 46
End: 2021-11-09

## 2021-11-09 ENCOUNTER — OUTPATIENT (OUTPATIENT)
Dept: OUTPATIENT SERVICES | Facility: HOSPITAL | Age: 46
LOS: 1 days | End: 2021-11-09
Payer: SELF-PAY

## 2021-11-09 VITALS
SYSTOLIC BLOOD PRESSURE: 120 MMHG | WEIGHT: 98 LBS | DIASTOLIC BLOOD PRESSURE: 79 MMHG | HEART RATE: 58 BPM | BODY MASS INDEX: 19.76 KG/M2 | HEIGHT: 59 IN | TEMPERATURE: 96.4 F

## 2021-11-09 DIAGNOSIS — Z12.11 ENCOUNTER FOR SCREENING FOR MALIGNANT NEOPLASM OF COLON: ICD-10-CM

## 2021-11-09 DIAGNOSIS — R10.13 EPIGASTRIC PAIN: ICD-10-CM

## 2021-11-09 DIAGNOSIS — Z98.890 OTHER SPECIFIED POSTPROCEDURAL STATES: Chronic | ICD-10-CM

## 2021-11-09 DIAGNOSIS — R10.9 UNSPECIFIED ABDOMINAL PAIN: ICD-10-CM

## 2021-11-09 DIAGNOSIS — K59.00 CONSTIPATION, UNSPECIFIED: ICD-10-CM

## 2021-11-09 DIAGNOSIS — R14.0 ABDOMINAL DISTENSION (GASEOUS): ICD-10-CM

## 2021-11-09 DIAGNOSIS — R63.4 ABNORMAL WEIGHT LOSS: ICD-10-CM

## 2021-11-09 DIAGNOSIS — K58.9 IRRITABLE BOWEL SYNDROME W/OUT DIARRHEA: ICD-10-CM

## 2021-11-09 DIAGNOSIS — K31.9 DISEASE OF STOMACH AND DUODENUM, UNSPECIFIED: ICD-10-CM

## 2021-11-09 DIAGNOSIS — Z87.19 PERSONAL HISTORY OF OTHER DISEASES OF THE DIGESTIVE SYSTEM: ICD-10-CM

## 2021-11-09 PROCEDURE — G0463: CPT

## 2021-11-09 NOTE — ASSESSMENT
[FreeTextEntry1] : 45 year old female with history of fibromyalgia, chronic constipation, chronic abdominal bloating, chronic heartburn, and chronic left sided abdominal pain. She has had a fairly extensive workup with normal TSH, negative EGD, colonoscopy, hydrogen breath test and Sitz marker tests. She has self-discontinued Linzess [due to cost], Miralax [due to "dysuria"] and Benefibre/Psyllium husk [due to excess bloating].  Her heartburn, abdominal bloating and abdominal pain persist despite PPI therapy, FODMAP diet and supplemental fiber. Her heartburn has improved since starting TCA therapy. Differential diagnosis at this time most suspicious for functional gastrointestinal disorders (functional dyspepsia, hypersensitive esophagus) vs. pancreatic insufficie and borderline slow-transit constipation vs. pelvic floor dyssnergia.\par \par # Chronic abdominal pain - Unchanged on TCA-, FODMAP diet, maximal PPI therapy, fiber supplementation. Ddx includes functional abdominal pain vs. less likely SIBO (borderline hydrogen breath test in July 2021) vs. IBS-Type C (pain does not improve with BMs)\par # Hypersensitive esophagus - No endoscopic evidence of esophagitis from 2020; heartburn and regurgitation mildly improved on TCA (did not improve with dietary changes or maximal acid suppression therapy). \par # Dyschezia - Improves with increased dietary fiber but with increased abdominal bloating. Possiblly explained by borderline slow-transit constipation vs. anorectal dysmotility; not likely IBS-type C\par \par Plan:\par - advised to stop taking "Super Enzyme" supplement for stool elastase test - will evaluate for possible pancreatic insufficiency (has been associated with abdominal bloating and pain in one EUS based study)\par - will task NP Ayesha Spencer to re-schedule for ARM with increased bowel prep prior to study (1 bottle of magnesium citrate and 2 enemas), to rule out anorectal dysynergy\par - start peppermint tea for abdominal bloating (2 cups daily)\par - continue amitriptyline 10 mg daily for hypersensitive esophagus and functional abdominal pain \par - FODMAP diet and other anti-reflux dietary modifications as tolerated\par - RTC in 3 months\par \par Seen with Dr. Palma\par \par GUALBERTO Moon PGY6\par GI Fellow\par

## 2021-11-09 NOTE — PHYSICAL EXAM
[General Appearance - Well Nourished] : well nourished [General Appearance - Well Developed] : well developed [Sclera] : the sclera and conjunctiva were normal [Oropharynx] : the oropharynx was normal [Neck Appearance] : the appearance of the neck was normal [Heart Rate And Rhythm] : heart rate was normal and rhythm regular [Edema] : there was no peripheral edema [Abdomen Soft] : soft [Abdomen Tenderness] : non-tender [Abdomen Mass (___ Cm)] : no abdominal mass palpated [Cervical Lymph Nodes Enlarged Posterior Bilaterally] : posterior cervical [Cervical Lymph Nodes Enlarged Anterior Bilaterally] : anterior cervical [Motor Tone] : muscle strength and tone were normal [] : no rash [Motor Exam] : the motor exam was normal [Oriented To Time, Place, And Person] : oriented to person, place, and time [FreeTextEntry1] : Mildly tympanitic to percussion, mildly distended

## 2021-11-09 NOTE — REVIEW OF SYSTEMS
[Abdominal Pain] : abdominal pain [Constipation] : constipation [Heartburn] : heartburn [Anxiety] : anxiety [Fever] : no fever [Chills] : no chills [Recent Weight Loss (___ Lbs)] : no recent weight loss [Chest Pain] : no chest pain [Lower Ext Edema] : no lower extremity edema [Shortness Of Breath] : no shortness of breath [Cough] : no cough [Vomiting] : no vomiting [Diarrhea] : no diarrhea [Dysuria] : no dysuria [Joint Swelling] : no joint swelling [Limb Pain] : no limb pain [Skin Lesions] : no skin lesions [Dizziness] : no dizziness [Easy Bleeding] : no tendency for easy bleeding [Easy Bruising] : no tendency for easy bruising [FreeTextEntry4] : Burning sensation in throat due to acid reflux [FreeTextEntry7] : Regurgitation

## 2021-11-09 NOTE — END OF VISIT
[] : Fellow [FreeTextEntry3] : As modified and discussed with patient\par MD [facility]\par Associate Professor of Medicine\par [facility] [employer]\par   [Time Spent: ___ minutes] : I have spent [unfilled] minutes of time on the encounter.

## 2021-11-09 NOTE — HISTORY OF PRESENT ILLNESS
[Heartburn] : improved heartburn [Nausea] : denies nausea [Vomiting] : denies vomiting [Diarrhea] : denies diarrhea [Constipation] : stable constipation [Yellow Skin Or Eyes (Jaundice)] : denies jaundice [Abdominal Pain] : stable abdominal pain [Abdominal Swelling] : abdominal swelling stable [Rectal Pain] : denies rectal pain [Wt Gain ___ Lbs] : no recent weight gain [de-identified] : ARM not completed on 10/22/21 due to poor bowel prep - to be rescheduled.  [de-identified] : 46 year old woman with lactose intolerance, chronic constipation, chronic bloating, fibromyalgia and functional dyspepsia who presents for follow up of chronic GI problems.\par \par Patient last seen 09/21/21. At this visit, patient was advised to start peppermint tea for bloating, start amitriptyline for functional abdominal pain and esophageal hypersensitivity and stop PPI given little perceived benefit. \par \par She has taken amitriptyline for 18 days now.  She reports mildly improved acid reflux with amitriptyline however no effect on chronic left-sided abdominal pain.  In the last week, she has moved her bowels on 4/7 days while taking psyllium husk inconsistently and increased intake of fruits for dietary fiber (plums, apple). Abdominal pain does not change with bowel movements as she has reported on prior visits. \par \par She forgot to start drinking peppermint tea for bloating. She has been taking "Super Enzyme" powder (ingredients include amylase, protease, lipase, ox bile extract, papaya fruit powder, aspergillopepsin, cellulase) which has improved her abdominal pain and bloating. She denies FamHx of pancreatic disease/insufficiency. \par \par She is awaiting the GI office to call her back to reschedule her anorectal manometry. \par

## 2021-11-19 ENCOUNTER — OUTPATIENT (OUTPATIENT)
Dept: OUTPATIENT SERVICES | Facility: HOSPITAL | Age: 46
LOS: 1 days | End: 2021-11-19
Payer: SELF-PAY

## 2021-11-19 DIAGNOSIS — R14.0 ABDOMINAL DISTENSION (GASEOUS): ICD-10-CM

## 2021-11-19 DIAGNOSIS — R10.9 UNSPECIFIED ABDOMINAL PAIN: ICD-10-CM

## 2021-11-19 DIAGNOSIS — Z98.890 OTHER SPECIFIED POSTPROCEDURAL STATES: Chronic | ICD-10-CM

## 2021-11-19 DIAGNOSIS — K59.00 CONSTIPATION, UNSPECIFIED: ICD-10-CM

## 2021-11-19 DIAGNOSIS — R63.4 ABNORMAL WEIGHT LOSS: ICD-10-CM

## 2021-11-19 PROCEDURE — 82653 EL-1 FECAL QUANTITATIVE: CPT

## 2021-11-24 LAB — ELASTASE PANC STL-MCNT: >500 — SIGNIFICANT CHANGE UP

## 2022-01-12 ENCOUNTER — APPOINTMENT (OUTPATIENT)
Dept: OBGYN | Facility: CLINIC | Age: 47
End: 2022-01-12

## 2022-01-21 ENCOUNTER — LABORATORY RESULT (OUTPATIENT)
Age: 47
End: 2022-01-21

## 2022-01-21 ENCOUNTER — RESULT REVIEW (OUTPATIENT)
Age: 47
End: 2022-01-21

## 2022-01-21 ENCOUNTER — OUTPATIENT (OUTPATIENT)
Dept: OUTPATIENT SERVICES | Facility: HOSPITAL | Age: 47
LOS: 1 days | End: 2022-01-21
Payer: SELF-PAY

## 2022-01-21 ENCOUNTER — APPOINTMENT (OUTPATIENT)
Dept: OBGYN | Facility: CLINIC | Age: 47
End: 2022-01-21
Payer: COMMERCIAL

## 2022-01-21 ENCOUNTER — RESULT CHARGE (OUTPATIENT)
Age: 47
End: 2022-01-21

## 2022-01-21 VITALS — DIASTOLIC BLOOD PRESSURE: 70 MMHG | BODY MASS INDEX: 19.79 KG/M2 | WEIGHT: 98 LBS | SYSTOLIC BLOOD PRESSURE: 108 MMHG

## 2022-01-21 DIAGNOSIS — Z98.890 OTHER SPECIFIED POSTPROCEDURAL STATES: Chronic | ICD-10-CM

## 2022-01-21 DIAGNOSIS — Z01.419 ENCOUNTER FOR GYNECOLOGICAL EXAMINATION (GENERAL) (ROUTINE) W/OUT ABNORMAL FINDINGS: ICD-10-CM

## 2022-01-21 DIAGNOSIS — G89.29 PELVIC AND PERINEAL PAIN: ICD-10-CM

## 2022-01-21 DIAGNOSIS — R10.32 LEFT LOWER QUADRANT PAIN: ICD-10-CM

## 2022-01-21 DIAGNOSIS — Z87.42 PERSONAL HISTORY OF OTHER DISEASES OF THE FEMALE GENITAL TRACT: ICD-10-CM

## 2022-01-21 DIAGNOSIS — G89.29 LEFT LOWER QUADRANT PAIN: ICD-10-CM

## 2022-01-21 DIAGNOSIS — R19.4 CHANGE IN BOWEL HABIT: ICD-10-CM

## 2022-01-21 DIAGNOSIS — R63.0 ANOREXIA: ICD-10-CM

## 2022-01-21 DIAGNOSIS — N76.0 ACUTE VAGINITIS: ICD-10-CM

## 2022-01-21 DIAGNOSIS — R10.2 PELVIC AND PERINEAL PAIN: ICD-10-CM

## 2022-01-21 LAB
BILIRUB UR QL STRIP: NORMAL
CLARITY UR: CLEAR
COLLECTION METHOD: NORMAL
GLUCOSE UR-MCNC: NORMAL
HCG UR QL: 0.2 EU/DL
HCG UR QL: NEGATIVE
HGB UR QL STRIP.AUTO: NORMAL
KETONES UR-MCNC: NORMAL
LEUKOCYTE ESTERASE UR QL STRIP: NORMAL
NITRITE UR QL STRIP: NORMAL
PH UR STRIP: 6
PROT UR STRIP-MCNC: NORMAL
QUALITY CONTROL: YES
SP GR UR STRIP: 1.01

## 2022-01-21 PROCEDURE — 87624 HPV HI-RISK TYP POOLED RSLT: CPT

## 2022-01-21 PROCEDURE — 99396 PREV VISIT EST AGE 40-64: CPT

## 2022-01-21 PROCEDURE — 88175 CYTOPATH C/V AUTO FLUID REDO: CPT

## 2022-01-21 PROCEDURE — G0463: CPT

## 2022-01-21 NOTE — REVIEW OF SYSTEMS
[Feels Underweight] : feels underweight [Recent ___ Lb Weight Loss] : recent [unfilled] ~Ulb weight loss [Abdominal Pain] : abdominal pain [Bloating] : bloating [Pain When Defecating] : pain when defecating [Nl] : Integumentary

## 2022-01-21 NOTE — PHYSICAL EXAM
[Awake] : awake [Alert] : alert [Acute Distress] : no acute distress [LAD] : no lymphadenopathy [Thyroid Nodule] : no thyroid nodule [Goiter] : no goiter [Mass] : no breast mass [Nipple Discharge] : no nipple discharge [Axillary LAD] : no axillary lymphadenopathy [Soft] : soft [Tender] : tender [Distended] : not distended [H/Smegaly] : no hepatosplenomegaly [Flat] : flat [LUQ] : in the left upper quadrant [LLQ] : in the left lower quadrant [Oriented x3] : oriented to person, place, and time [Depressed Mood] : depressed mood [Flat Affect] : affect not flat [Normal] : uterus [Labia Majora] : labia major [Labia Minora] : labia minora [No Bleeding] : there was no active vaginal bleeding [Pap Obtained] : a Pap smear was performed [Motion Tenderness] : there was no cervical motion tenderness [Uterine Adnexae] : were not tender and not enlarged [RRR, No Murmurs] : RRR, no murmurs [CTAB] : CTAB

## 2022-01-21 NOTE — HISTORY OF PRESENT ILLNESS
[Lt-Middle-Q] : left middle quadrant [Lower-Lt-Q] : left lower quadrant [Continuous] : continuous [Pain with BMs] : pain with bowel movements [FreeTextEntry2] : change in bowel consistency [FreeTextEntry3] : defecation makes pain better [FreeTextEntry4] : p [Definite:  ___ (Date)] : the last menstrual period was [unfilled] [Normal Amount/Duration] : was of a normal amount and duration [Spotting Between  Menses] : spotting reported between menses [Regular Cycle Intervals] : periods have been irregular [Menstrual Cramps] : no menstrual cramps [Sexually Active] : is not sexually active

## 2022-01-22 LAB — HPV HIGH+LOW RISK DNA PNL CVX: SIGNIFICANT CHANGE UP

## 2022-01-25 LAB — CYTOLOGY SPEC DOC CYTO: SIGNIFICANT CHANGE UP

## 2022-02-02 DIAGNOSIS — R19.4 CHANGE IN BOWEL HABIT: ICD-10-CM

## 2022-02-02 DIAGNOSIS — G89.29 OTHER CHRONIC PAIN: ICD-10-CM

## 2022-02-02 DIAGNOSIS — R10.32 LEFT LOWER QUADRANT PAIN: ICD-10-CM

## 2022-02-02 DIAGNOSIS — R63.0 ANOREXIA: ICD-10-CM

## 2022-02-02 DIAGNOSIS — Z01.419 ENCOUNTER FOR GYNECOLOGICAL EXAMINATION (GENERAL) (ROUTINE) WITHOUT ABNORMAL FINDINGS: ICD-10-CM

## 2022-02-02 DIAGNOSIS — R10.2 PELVIC AND PERINEAL PAIN: ICD-10-CM

## 2022-02-19 ENCOUNTER — EMERGENCY (EMERGENCY)
Facility: HOSPITAL | Age: 47
LOS: 1 days | Discharge: HOME | End: 2022-02-19
Payer: MEDICAID

## 2022-02-19 VITALS
OXYGEN SATURATION: 100 % | SYSTOLIC BLOOD PRESSURE: 124 MMHG | RESPIRATION RATE: 20 BRPM | WEIGHT: 110.01 LBS | TEMPERATURE: 98 F | HEIGHT: 59 IN | HEART RATE: 75 BPM | DIASTOLIC BLOOD PRESSURE: 86 MMHG

## 2022-02-19 VITALS
RESPIRATION RATE: 18 BRPM | TEMPERATURE: 98 F | OXYGEN SATURATION: 100 % | HEART RATE: 62 BPM | DIASTOLIC BLOOD PRESSURE: 78 MMHG | SYSTOLIC BLOOD PRESSURE: 101 MMHG

## 2022-02-19 DIAGNOSIS — Z98.890 OTHER SPECIFIED POSTPROCEDURAL STATES: Chronic | ICD-10-CM

## 2022-02-19 LAB
ALBUMIN SERPL ELPH-MCNC: 4.5 G/DL — SIGNIFICANT CHANGE UP (ref 3.3–5)
ALP SERPL-CCNC: 43 U/L — SIGNIFICANT CHANGE UP (ref 40–120)
ALT FLD-CCNC: 17 U/L — SIGNIFICANT CHANGE UP (ref 10–45)
ANION GAP SERPL CALC-SCNC: 9 MMOL/L — SIGNIFICANT CHANGE UP (ref 5–17)
APPEARANCE UR: CLEAR — SIGNIFICANT CHANGE UP
AST SERPL-CCNC: 24 U/L — SIGNIFICANT CHANGE UP (ref 10–40)
BASE EXCESS BLDV CALC-SCNC: -0.3 MMOL/L — SIGNIFICANT CHANGE UP (ref -2–2)
BASOPHILS # BLD AUTO: 0.06 K/UL — SIGNIFICANT CHANGE UP (ref 0–0.2)
BASOPHILS NFR BLD AUTO: 1.1 % — SIGNIFICANT CHANGE UP (ref 0–2)
BILIRUB SERPL-MCNC: 0.4 MG/DL — SIGNIFICANT CHANGE UP (ref 0.2–1.2)
BILIRUB UR-MCNC: NEGATIVE — SIGNIFICANT CHANGE UP
BUN SERPL-MCNC: 10 MG/DL — SIGNIFICANT CHANGE UP (ref 7–23)
CA-I SERPL-SCNC: 1.17 MMOL/L — SIGNIFICANT CHANGE UP (ref 1.15–1.33)
CALCIUM SERPL-MCNC: 8.8 MG/DL — SIGNIFICANT CHANGE UP (ref 8.4–10.5)
CHLORIDE BLDV-SCNC: 105 MMOL/L — SIGNIFICANT CHANGE UP (ref 96–108)
CHLORIDE SERPL-SCNC: 106 MMOL/L — SIGNIFICANT CHANGE UP (ref 96–108)
CO2 BLDV-SCNC: 29 MMOL/L — HIGH (ref 22–26)
CO2 SERPL-SCNC: 21 MMOL/L — LOW (ref 22–31)
COLOR SPEC: COLORLESS — SIGNIFICANT CHANGE UP
CREAT SERPL-MCNC: 0.71 MG/DL — SIGNIFICANT CHANGE UP (ref 0.5–1.3)
DIFF PNL FLD: NEGATIVE — SIGNIFICANT CHANGE UP
EOSINOPHIL # BLD AUTO: 0.29 K/UL — SIGNIFICANT CHANGE UP (ref 0–0.5)
EOSINOPHIL NFR BLD AUTO: 5.2 % — SIGNIFICANT CHANGE UP (ref 0–6)
GAS PNL BLDV: 134 MMOL/L — LOW (ref 136–145)
GAS PNL BLDV: SIGNIFICANT CHANGE UP
GLUCOSE BLDV-MCNC: 77 MG/DL — SIGNIFICANT CHANGE UP (ref 70–99)
GLUCOSE SERPL-MCNC: 81 MG/DL — SIGNIFICANT CHANGE UP (ref 70–99)
GLUCOSE UR QL: NEGATIVE — SIGNIFICANT CHANGE UP
HCG SERPL-ACNC: <2 MIU/ML — SIGNIFICANT CHANGE UP
HCO3 BLDV-SCNC: 27 MMOL/L — SIGNIFICANT CHANGE UP (ref 22–29)
HCT VFR BLD CALC: 38.8 % — SIGNIFICANT CHANGE UP (ref 34.5–45)
HCT VFR BLDA CALC: 40 % — SIGNIFICANT CHANGE UP (ref 34.5–46.5)
HGB BLD CALC-MCNC: 13.2 G/DL — SIGNIFICANT CHANGE UP (ref 11.7–16.1)
HGB BLD-MCNC: 12.9 G/DL — SIGNIFICANT CHANGE UP (ref 11.5–15.5)
IMM GRANULOCYTES NFR BLD AUTO: 0.2 % — SIGNIFICANT CHANGE UP (ref 0–1.5)
KETONES UR-MCNC: NEGATIVE — SIGNIFICANT CHANGE UP
LACTATE BLDV-MCNC: 1.3 MMOL/L — SIGNIFICANT CHANGE UP (ref 0.7–2)
LEUKOCYTE ESTERASE UR-ACNC: NEGATIVE — SIGNIFICANT CHANGE UP
LIDOCAIN IGE QN: 48 U/L — SIGNIFICANT CHANGE UP (ref 7–60)
LYMPHOCYTES # BLD AUTO: 2.35 K/UL — SIGNIFICANT CHANGE UP (ref 1–3.3)
LYMPHOCYTES # BLD AUTO: 41.9 % — SIGNIFICANT CHANGE UP (ref 13–44)
MCHC RBC-ENTMCNC: 30.2 PG — SIGNIFICANT CHANGE UP (ref 27–34)
MCHC RBC-ENTMCNC: 33.2 GM/DL — SIGNIFICANT CHANGE UP (ref 32–36)
MCV RBC AUTO: 90.9 FL — SIGNIFICANT CHANGE UP (ref 80–100)
MONOCYTES # BLD AUTO: 0.31 K/UL — SIGNIFICANT CHANGE UP (ref 0–0.9)
MONOCYTES NFR BLD AUTO: 5.5 % — SIGNIFICANT CHANGE UP (ref 2–14)
NEUTROPHILS # BLD AUTO: 2.59 K/UL — SIGNIFICANT CHANGE UP (ref 1.8–7.4)
NEUTROPHILS NFR BLD AUTO: 46.1 % — SIGNIFICANT CHANGE UP (ref 43–77)
NITRITE UR-MCNC: NEGATIVE — SIGNIFICANT CHANGE UP
NRBC # BLD: 0 /100 WBCS — SIGNIFICANT CHANGE UP (ref 0–0)
PCO2 BLDV: 55 MMHG — HIGH (ref 39–42)
PH BLDV: 7.3 — LOW (ref 7.32–7.43)
PH UR: 7.5 — SIGNIFICANT CHANGE UP (ref 5–8)
PLATELET # BLD AUTO: 265 K/UL — SIGNIFICANT CHANGE UP (ref 150–400)
PO2 BLDV: 30 MMHG — SIGNIFICANT CHANGE UP (ref 25–45)
POTASSIUM BLDV-SCNC: 6 MMOL/L — HIGH (ref 3.5–5.1)
POTASSIUM SERPL-MCNC: 4.6 MMOL/L — SIGNIFICANT CHANGE UP (ref 3.5–5.3)
POTASSIUM SERPL-SCNC: 4.6 MMOL/L — SIGNIFICANT CHANGE UP (ref 3.5–5.3)
PROT SERPL-MCNC: 7.5 G/DL — SIGNIFICANT CHANGE UP (ref 6–8.3)
PROT UR-MCNC: NEGATIVE — SIGNIFICANT CHANGE UP
RBC # BLD: 4.27 M/UL — SIGNIFICANT CHANGE UP (ref 3.8–5.2)
RBC # FLD: 12.8 % — SIGNIFICANT CHANGE UP (ref 10.3–14.5)
SAO2 % BLDV: 47.1 % — LOW (ref 67–88)
SODIUM SERPL-SCNC: 136 MMOL/L — SIGNIFICANT CHANGE UP (ref 135–145)
SP GR SPEC: 1 — LOW (ref 1.01–1.02)
T3 SERPL-MCNC: 107 NG/DL — SIGNIFICANT CHANGE UP (ref 80–200)
T4 AB SER-ACNC: 5.4 UG/DL — SIGNIFICANT CHANGE UP (ref 4.6–12)
TSH SERPL-MCNC: 5.81 UIU/ML — HIGH (ref 0.27–4.2)
UROBILINOGEN FLD QL: NEGATIVE — SIGNIFICANT CHANGE UP
WBC # BLD: 5.61 K/UL — SIGNIFICANT CHANGE UP (ref 3.8–10.5)
WBC # FLD AUTO: 5.61 K/UL — SIGNIFICANT CHANGE UP (ref 3.8–10.5)

## 2022-02-19 PROCEDURE — 80053 COMPREHEN METABOLIC PANEL: CPT

## 2022-02-19 PROCEDURE — 82947 ASSAY GLUCOSE BLOOD QUANT: CPT

## 2022-02-19 PROCEDURE — 84443 ASSAY THYROID STIM HORMONE: CPT

## 2022-02-19 PROCEDURE — 82330 ASSAY OF CALCIUM: CPT

## 2022-02-19 PROCEDURE — 82565 ASSAY OF CREATININE: CPT

## 2022-02-19 PROCEDURE — 81003 URINALYSIS AUTO W/O SCOPE: CPT

## 2022-02-19 PROCEDURE — 99284 EMERGENCY DEPT VISIT MOD MDM: CPT | Mod: 25

## 2022-02-19 PROCEDURE — 74177 CT ABD & PELVIS W/CONTRAST: CPT | Mod: MA

## 2022-02-19 PROCEDURE — 96374 THER/PROPH/DIAG INJ IV PUSH: CPT | Mod: XU

## 2022-02-19 PROCEDURE — 74177 CT ABD & PELVIS W/CONTRAST: CPT | Mod: 26,MA

## 2022-02-19 PROCEDURE — 82435 ASSAY OF BLOOD CHLORIDE: CPT

## 2022-02-19 PROCEDURE — 84702 CHORIONIC GONADOTROPIN TEST: CPT

## 2022-02-19 PROCEDURE — 84480 ASSAY TRIIODOTHYRONINE (T3): CPT

## 2022-02-19 PROCEDURE — 83605 ASSAY OF LACTIC ACID: CPT

## 2022-02-19 PROCEDURE — 85014 HEMATOCRIT: CPT

## 2022-02-19 PROCEDURE — 82803 BLOOD GASES ANY COMBINATION: CPT

## 2022-02-19 PROCEDURE — 96375 TX/PRO/DX INJ NEW DRUG ADDON: CPT

## 2022-02-19 PROCEDURE — 85018 HEMOGLOBIN: CPT

## 2022-02-19 PROCEDURE — 84132 ASSAY OF SERUM POTASSIUM: CPT

## 2022-02-19 PROCEDURE — 85025 COMPLETE CBC W/AUTO DIFF WBC: CPT

## 2022-02-19 PROCEDURE — 99285 EMERGENCY DEPT VISIT HI MDM: CPT

## 2022-02-19 PROCEDURE — 84436 ASSAY OF TOTAL THYROXINE: CPT

## 2022-02-19 PROCEDURE — 84295 ASSAY OF SERUM SODIUM: CPT

## 2022-02-19 PROCEDURE — 83690 ASSAY OF LIPASE: CPT

## 2022-02-19 RX ORDER — SODIUM CHLORIDE 9 MG/ML
1000 INJECTION INTRAMUSCULAR; INTRAVENOUS; SUBCUTANEOUS ONCE
Refills: 0 | Status: COMPLETED | OUTPATIENT
Start: 2022-02-19 | End: 2022-02-19

## 2022-02-19 RX ORDER — ONDANSETRON 8 MG/1
4 TABLET, FILM COATED ORAL ONCE
Refills: 0 | Status: COMPLETED | OUTPATIENT
Start: 2022-02-19 | End: 2022-02-19

## 2022-02-19 RX ORDER — ACETAMINOPHEN 500 MG
650 TABLET ORAL ONCE
Refills: 0 | Status: COMPLETED | OUTPATIENT
Start: 2022-02-19 | End: 2022-02-19

## 2022-02-19 RX ORDER — FAMOTIDINE 10 MG/ML
20 INJECTION INTRAVENOUS ONCE
Refills: 0 | Status: COMPLETED | OUTPATIENT
Start: 2022-02-19 | End: 2022-02-19

## 2022-02-19 RX ORDER — MULTIVIT WITH MIN/MFOLATE/K2 340-15/3 G
1 POWDER (GRAM) ORAL ONCE
Refills: 0 | Status: COMPLETED | OUTPATIENT
Start: 2022-02-19 | End: 2022-02-19

## 2022-02-19 RX ADMIN — FAMOTIDINE 20 MILLIGRAM(S): 10 INJECTION INTRAVENOUS at 06:44

## 2022-02-19 RX ADMIN — SODIUM CHLORIDE 1000 MILLILITER(S): 9 INJECTION INTRAMUSCULAR; INTRAVENOUS; SUBCUTANEOUS at 06:42

## 2022-02-19 RX ADMIN — Medication 650 MILLIGRAM(S): at 06:44

## 2022-02-19 RX ADMIN — Medication 5 MILLIGRAM(S): at 06:55

## 2022-02-19 RX ADMIN — Medication 30 MILLILITER(S): at 06:44

## 2022-02-19 RX ADMIN — Medication 1 BOTTLE: at 06:58

## 2022-02-19 NOTE — ED ADULT NURSE NOTE - NSIMPLEMENTINTERV_GEN_ALL_ED
Implemented All Universal Safety Interventions:  [place] to call system. Call bell, personal items and telephone within reach. Instruct patient to call for assistance. Room bathroom lighting operational. Non-slip footwear when patient is off stretcher. Physically safe environment: no spills, clutter or unnecessary equipment. Stretcher in lowest position, wheels locked, appropriate side rails in place.

## 2022-02-19 NOTE — ED ADULT NURSE NOTE - OBJECTIVE STATEMENT
46 y old female with PMH of GERD, ovarian cyst, HPV, fibromyalgia, insomnia and H pylori presents to the ED from home c/o bloating, constipation and abdominal pain x 1 day. Pt reports she has been experiencing these symptoms since July 2020 but it got worse over the past day. Pt endorses intermittent LLQ pain radiating to L flank. Pt endorses intermittent nausea, denies vomiting. Pt reports last BM 2/17/2022. Denies HA, fevers, chills, CP, SOB, vomiting, diarrhea, urinary s/s, weakness. Pt A&O x 4, ambulatory. Nonlabored breathing on RA. Abdomen soft, distended and tender on palpation of LLQ. Peripheral pulses strong. Skin warm, dry and intact. Bed in lowest position, side rails up and call bell in reach.

## 2022-02-19 NOTE — ED ADULT NURSE REASSESSMENT NOTE - NS ED NURSE REASSESS COMMENT FT1
Awake and alert. Abdomen distended but soft. Left side of abdomen mildly tender. Still reports pain 6/10. Awaiting for Ct scan of abdomen. Will continue to monitor.

## 2022-02-19 NOTE — ED PROVIDER NOTE - PATIENT PORTAL LINK FT
You can access the FollowMyHealth Patient Portal offered by [facility] by registering at the following website: [email or web address]. By joining [employer]’s FollowMyHealth portal, you will also be able to view your health information using other applications (apps) compatible with our system.

## 2022-02-19 NOTE — ED ADULT NURSE NOTE - CADM POA URETHRAL CATHETER
female    Clinic Visit Summary    March 21, 2017      CHAPIN SUGGS Description:  Female YOB: 1949   MRN: [id number] Provider:  Vanessa Jauregui M.D.    Primary Physician:  Sarina Merlos MD  Referring Physician:  Sandy Shaver MD     Insurance Information  MEDICARE [id number] 11/1/2016  [facility] [id number] 3/20/2017     Reason for Visit  Follow up appointment       Health History  Malignant neoplasm of central portion of left female breast [ICD10] C50.112    Vitals (last recorded)  BP Pulse Height Weight Temp   125/73  74  67in (170.18cm)  148.81lb  (67.5kg) 36.6ºc     Allergies (as reported by patient)  Allergy Type Name Reaction   Medication Allergy paclitaxel Hives   Medication Allergy paclitaxel Itching   Misc. Allergy cats Itching      Current Medications (as reported by patient)  Drug Name Dose Route   Tylenol 8 Hour 2 tab Oral   anastrozole 1 mg Oral        Medications prescribed today  Date Description Ordering Physician      Orders  Date Description Ordering Physician     Next Visit Information  Appointment Date Appointment Time Activity   9/21/2017 9:15 AM RN FOLLOW UP   9/21/2017 9:30 AM Follow Up     Special instructions          Reminders  · If you did not schedule your follow up appointment at the time of your visit, please call the department at [phone].  · Please provide a copy of this summary of care to your next provider.     No

## 2022-02-19 NOTE — ED PROVIDER NOTE - CLINICAL SUMMARY MEDICAL DECISION MAKING FREE TEXT BOX
47 yo F chronic abd pain constipation gerd h pylori previous abd surgery p/w worsening of abd pain distention. VSS abd disteneded TTP LLQ. Concern for SBO vs appy vs diverticulitis vs constipation. Will get labs vbg ua tfts ct ab. Will give ivf zofran pepcid maalox bowel regimen analgesia and r/a.

## 2022-02-19 NOTE — ED PROVIDER NOTE - ATTENDING CONTRIBUTION TO CARE
MD Rincon:  patient seen and evaluated with the resident.  I was present for key portions of the History & Physical, and I agree with the Impression & Plan.  MD Rincon:  45 yo F, c/o L-sided abd pain, distension and multiple prior abd surgeries.   Duration: 2d  Quality:  bloating  Associated Sx:  constipation, no BM x 2d  Modifiers:  any POs exacerbate her pain.    Context:   multiple prior abd surgeries (L salpingo-oophorectomy)  Physical Exam:  adult F, uncomfortable-appearing, NCAT, PERRL, EOMI, neck supple, CTA B, no edema, AAOx3 ambulates w/o difficulty.    Abdomen:  +distended and with mild diffuse TTP; no rebound/guarding  Impression/Plan:  Adult F, +prior abdominal surgery with abdominal distension/bloating that is significant enough to warrant imaging to r/o acute intraabdominal pathology.  Patient will be evaluated with labs, ua, CT abdomen, and treated with APAP and IVF (currently refusing stronger pain meds).  Will Tx with stool softeners.

## 2022-02-19 NOTE — ED PROVIDER NOTE - NSICDXPASTMEDICALHX_GEN_ALL_CORE_FT
PAST MEDICAL HISTORY:  Complex ovarian cyst     Fibromyalgia     GERD (gastroesophageal reflux disease)     H pylori ulcer treated    HPV (human papilloma virus) infection     Insomnia

## 2022-02-19 NOTE — ED PROVIDER NOTE - NSFOLLOWUPINSTRUCTIONS_ED_ALL_ED_FT
Constipation is when a person has fewer than three bowel movements a week, has difficulty having a bowel movement, or has stools that are dry, hard, or larger than normal. As people grow older, constipation is more common. A low-fiber diet, not taking in enough fluids, and taking certain medicines may make constipation worse.     CAUSES  Certain medicines, such as antidepressants, pain medicine, iron supplements, antacids, and water pills.    Certain diseases, such as diabetes, irritable bowel syndrome (IBS), thyroid disease, or depression.    Not drinking enough water.    Not eating enough fiber-rich foods.    Stress or travel.    Lack of physical activity or exercise.    Ignoring the urge to have a bowel movement.    Using laxatives too much.      SIGNS AND SYMPTOMS  Having fewer than three bowel movements a week.    Straining to have a bowel movement.    Having stools that are hard, dry, or larger than normal.    Feeling full or bloated.    Pain in the lower abdomen.    Not feeling relief after having a bowel movement.          TREATMENT  Treatment will depend on the severity of your constipation and what is causing it. Some dietary treatments include drinking more fluids and eating more fiber-rich foods. Lifestyle treatments may include regular exercise. If these diet and lifestyle recommendations do not help, your health care provider may recommend taking over-the-counter laxative medicines to help you have bowel movements. Prescription medicines may be prescribed if over-the-counter medicines do not work.     HOME CARE INSTRUCTIONS  Eat foods that have a lot of fiber, such as fruits, vegetables, whole grains, and beans.  Limit foods high in fat and processed sugars, such as french fries, hamburgers, cookies, candies, and soda.    A fiber supplement may be added to your diet if you cannot get enough fiber from foods.    Drink enough fluids to keep your urine clear or pale yellow.    Exercise regularly or as directed by your health care provider.    Go to the restroom when you have the urge to go. Do not hold it.    Only take over-the-counter or prescription medicines as directed by your health care provider. Do not take other medicines for constipation without talking to your health care provider first.       SEEK IMMEDIATE MEDICAL CARE IF:  You have bright red blood in your stool.    Your constipation lasts for more than 4 days or gets worse.    You have abdominal or rectal pain.    You have thin, pencil-like stools.    You have unexplained weight loss.     MAKE SURE YOU:  Understand these instructions.  Will watch your condition.  Will get help right away if you are not doing well or get worse.

## 2022-02-19 NOTE — ED PROVIDER NOTE - PHYSICAL EXAMINATION
Gen: NAD, non-toxic appearing  Head: normal appearing  HEENT: normal conjunctiva, oral mucosa moist  Lung: no respiratory distress, speaking in full sentences, CTA b/l     CV: regular rate and rhythm, no murmurs  Abd: soft, distended TTP LLQ   MSK: no visible deformities  Neuro: No focal deficits, AAOx3  Skin: Warm  Psych: normal affect

## 2022-02-19 NOTE — ED PROVIDER NOTE - OBJECTIVE STATEMENT
45 yo F pmhx chronic L sided abd pain has had CTs endoscopy worked up by GI, fibromyalgia, GERD, H Pylori and L sided ovarian cyst s/p L salpingo-oophorectomy 12/20. p/w worsening of abd pain and distention. Patient reports constant daily L sided abdominal pain L side upper with radiation to lower sharp pain. worsened abd swelling and distention over last few days. last BM 2 days ago with some diarrhea. denies blood in stool. Reports stool is foul-smelling and consistency of nicole. Has nausea no vomiting. Pt also reports dry skin and fatigue. Denies f/c cp sob dysuria.

## 2022-02-24 ENCOUNTER — APPOINTMENT (OUTPATIENT)
Dept: DERMATOLOGY | Facility: HOSPITAL | Age: 47
End: 2022-02-24

## 2022-02-24 ENCOUNTER — NON-APPOINTMENT (OUTPATIENT)
Age: 47
End: 2022-02-24

## 2022-03-01 ENCOUNTER — APPOINTMENT (OUTPATIENT)
Dept: GASTROENTEROLOGY | Facility: HOSPITAL | Age: 47
End: 2022-03-01
Payer: COMMERCIAL

## 2022-03-01 ENCOUNTER — OUTPATIENT (OUTPATIENT)
Dept: OUTPATIENT SERVICES | Facility: HOSPITAL | Age: 47
LOS: 1 days | End: 2022-03-01
Payer: SELF-PAY

## 2022-03-01 VITALS
TEMPERATURE: 96.1 F | HEIGHT: 59 IN | HEART RATE: 75 BPM | WEIGHT: 98 LBS | BODY MASS INDEX: 19.76 KG/M2 | DIASTOLIC BLOOD PRESSURE: 81 MMHG | SYSTOLIC BLOOD PRESSURE: 123 MMHG

## 2022-03-01 DIAGNOSIS — Z98.890 OTHER SPECIFIED POSTPROCEDURAL STATES: Chronic | ICD-10-CM

## 2022-03-01 DIAGNOSIS — R10.9 UNSPECIFIED ABDOMINAL PAIN: ICD-10-CM

## 2022-03-01 PROCEDURE — ZZZZZ: CPT | Mod: GC

## 2022-03-01 PROCEDURE — G0463: CPT

## 2022-03-01 RX ORDER — WHEAT DEXTRIN 3 G/4 G
POWDER (GRAM) ORAL
Qty: 1 | Refills: 2 | Status: DISCONTINUED | COMMUNITY
Start: 2021-01-12 | End: 2022-03-01

## 2022-03-01 NOTE — END OF VISIT
[] : Fellow [FreeTextEntry3] : As modified and discussed with patient\par [facility]\par Associate Professor of Medicine\par [facility]\par

## 2022-03-01 NOTE — HISTORY OF PRESENT ILLNESS
[Heartburn] : stable heartburn [Nausea] : denies nausea [Vomiting] : denies vomiting [Diarrhea] : denies diarrhea [Constipation] : stable constipation [Yellow Skin Or Eyes (Jaundice)] : denies jaundice [Abdominal Pain] : stable abdominal pain [Abdominal Swelling] : abdominal swelling worsened [Rectal Pain] : denies rectal pain [Wt Loss ___ Lbs] : recent [unfilled] ~Upound(s) weight loss [de-identified] : 46 year old woman with lactose intolerance, chronic constipation, chronic bloating, fibromyalgia and functional dyspepsia who presents for follow up of chronic GI problems.\par \par Did not receive call to schedule ARM. Reports no change in chronic symptoms since last visit. Still having small stools almost daily, but reports incomplete evacuation and persistent bloating after defecation. \par \par Reports ED visit on 2/19/22  for constipation. Per [facility] ED provider note, patient last had BM 2 days prior, noted to be distended on exam. Given SMOG enema and mag citrate after which patient had large BM. Continues to take psyllium husk (generic brand) and increased physical activity. \par \par Stopped using "Super Enzyme" as advised last clinic visit. \par Stool elastase was normal. \par \par

## 2022-03-01 NOTE — ASSESSMENT
[FreeTextEntry1] : 45 year old female with history of fibromyalgia, chronic constipation, chronic abdominal bloating, chronic heartburn, and chronic left sided abdominal pain. She has had a fairly extensive workup with normal TSH, negative EGD, colonoscopy, hydrogen breath test and Sitz marker tests. She has self-discontinued Linzess [due to cost], Miralax [due to "dysuria"] and Benefiber/Psyllium husk [due to excess bloating]. Her heartburn, abdominal bloating and abdominal pain persist despite PPI therapy, FODMAP diet and supplemental fiber. Her heartburn has improved since starting TCA therapy. Differential diagnosis at this time most suspicious for functional gastrointestinal disorders (functional dyspepsia, hypersensitive esophagus) vs. pancreatic insufficie and borderline slow-transit constipation vs. pelvic floor dyssynergia.\par \par # Chronic abdominal pain - Unchanged on TCA, FODMAP diet, maximal PPI therapy, increased fiber. Ddx includes functional abdominal pain vs. less likely SIBO (borderline HBT July 2021) vs. IBS-Type C.\par # Hypersensitive esophagus - No evidence of esophagitis on EGD 2020; stable on low dose TCA \par # Obstipation- Associated with feeling of incomplete evacuation Improves with increased dietary fiber but at cost of increased bloat. Ddx includes pelvic floor dyssynergia, anorectal dysmotility and slow-transit constipation.\par \par Plan:\par - re-order ARM today; bowel prep for ARM with FLEET enema and mag citrate evening before procedure, and tap water enema on day of procedure\par - FLEET enema and laxative PRN for worsening obstipation \par - cont amitriptyline 10 mg qd for  hypersensitive esophagus/abdominal pain \par - FODMAP diet/anti-reflux dietary modifications as tolerated\par - RTC after ARM completed\par \par Seen with Dr. Palma\par \par GUALBERTO Moon PGY6\par GI Fellow\par

## 2022-03-01 NOTE — PHYSICAL EXAM
[General Appearance - Well-Appearing] : healthy appearing [Sclera] : the sclera and conjunctiva were normal [Oropharynx] : the oropharynx was normal [Neck Appearance] : the appearance of the neck was normal [Respiration, Rhythm And Depth] : normal respiratory rhythm and effort [Heart Rate And Rhythm] : heart rate was normal and rhythm regular [Edema] : there was no peripheral edema [Bowel Sounds] : normal bowel sounds [Abdomen Soft] : soft [Abdomen Tenderness] : non-tender [Cervical Lymph Nodes Enlarged Posterior Bilaterally] : posterior cervical [Cervical Lymph Nodes Enlarged Anterior Bilaterally] : anterior cervical [Motor Tone] : muscle strength and tone were normal [] : no rash [Motor Exam] : the motor exam was normal [Oriented To Time, Place, And Person] : oriented to person, place, and time [FreeTextEntry1] : Non-distended

## 2022-03-01 NOTE — REVIEW OF SYSTEMS
[As Noted in HPI] : as noted in HPI [Fever] : no fever [Chills] : no chills [Recent Weight Loss (___ Lbs)] : no recent weight loss [Sore Throat] : no sore throat [Chest Pain] : no chest pain [Lower Ext Edema] : no lower extremity edema [Shortness Of Breath] : no shortness of breath [Cough] : no cough [Muscle Weakness] : no muscle weakness [Easy Bleeding] : no tendency for easy bleeding [Easy Bruising] : no tendency for easy bruising

## 2022-03-02 DIAGNOSIS — R14.0 ABDOMINAL DISTENSION (GASEOUS): ICD-10-CM

## 2022-03-02 DIAGNOSIS — K59.09 OTHER CONSTIPATION: ICD-10-CM

## 2022-03-02 DIAGNOSIS — R10.13 EPIGASTRIC PAIN: ICD-10-CM

## 2022-03-24 ENCOUNTER — APPOINTMENT (OUTPATIENT)
Dept: DERMATOLOGY | Facility: HOSPITAL | Age: 47
End: 2022-03-24

## 2022-03-24 ENCOUNTER — OUTPATIENT (OUTPATIENT)
Dept: OUTPATIENT SERVICES | Facility: HOSPITAL | Age: 47
LOS: 1 days | End: 2022-03-24
Payer: SELF-PAY

## 2022-03-24 VITALS
TEMPERATURE: 97 F | HEIGHT: 59 IN | RESPIRATION RATE: 14 BRPM | HEART RATE: 60 BPM | BODY MASS INDEX: 19.76 KG/M2 | WEIGHT: 98 LBS | SYSTOLIC BLOOD PRESSURE: 103 MMHG | DIASTOLIC BLOOD PRESSURE: 69 MMHG

## 2022-03-24 DIAGNOSIS — Z98.890 OTHER SPECIFIED POSTPROCEDURAL STATES: Chronic | ICD-10-CM

## 2022-03-24 DIAGNOSIS — D22.9 MELANOCYTIC NEVI, UNSPECIFIED: ICD-10-CM

## 2022-03-24 DIAGNOSIS — L85.3 XEROSIS CUTIS: ICD-10-CM

## 2022-03-24 DIAGNOSIS — L72.2 STEATOCYSTOMA MULTIPLEX: ICD-10-CM

## 2022-03-24 PROCEDURE — G0463: CPT

## 2022-03-24 RX ORDER — AMITRIPTYLINE HYDROCHLORIDE 10 MG/1
10 TABLET, FILM COATED ORAL
Qty: 30 | Refills: 3 | Status: DISCONTINUED | COMMUNITY
Start: 2021-09-21 | End: 2022-03-24

## 2022-03-24 RX ORDER — ACETAMINOPHEN 500 MG/1
500 TABLET ORAL
Qty: 180 | Refills: 0 | Status: DISCONTINUED | COMMUNITY
Start: 2020-03-30 | End: 2022-03-24

## 2022-03-25 DIAGNOSIS — D22.9 MELANOCYTIC NEVI, UNSPECIFIED: ICD-10-CM

## 2022-03-25 DIAGNOSIS — L85.3 XEROSIS CUTIS: ICD-10-CM

## 2022-04-13 ENCOUNTER — OUTPATIENT (OUTPATIENT)
Dept: OUTPATIENT SERVICES | Facility: HOSPITAL | Age: 47
LOS: 1 days | End: 2022-04-13
Payer: SELF-PAY

## 2022-04-13 DIAGNOSIS — Z98.890 OTHER SPECIFIED POSTPROCEDURAL STATES: Chronic | ICD-10-CM

## 2022-04-13 DIAGNOSIS — Z11.52 ENCOUNTER FOR SCREENING FOR COVID-19: ICD-10-CM

## 2022-04-13 LAB — SARS-COV-2 RNA SPEC QL NAA+PROBE: SIGNIFICANT CHANGE UP

## 2022-04-13 PROCEDURE — C9803: CPT

## 2022-04-13 PROCEDURE — U0005: CPT

## 2022-04-13 PROCEDURE — U0003: CPT

## 2022-04-15 ENCOUNTER — OUTPATIENT (OUTPATIENT)
Dept: OUTPATIENT SERVICES | Facility: HOSPITAL | Age: 47
LOS: 1 days | End: 2022-04-15
Payer: SELF-PAY

## 2022-04-15 ENCOUNTER — TRANSCRIPTION ENCOUNTER (OUTPATIENT)
Age: 47
End: 2022-04-15

## 2022-04-15 VITALS — HEIGHT: 60 IN | WEIGHT: 98.11 LBS

## 2022-04-15 DIAGNOSIS — K59.09 OTHER CONSTIPATION: ICD-10-CM

## 2022-04-15 DIAGNOSIS — Z98.890 OTHER SPECIFIED POSTPROCEDURAL STATES: Chronic | ICD-10-CM

## 2022-04-15 PROCEDURE — 91122 ANORECTAL MANOMETRY: CPT | Mod: 26

## 2022-04-15 PROCEDURE — 91122: CPT

## 2022-04-15 PROCEDURE — 91120: CPT | Mod: 26

## 2022-04-15 DEVICE — NET RETRV ROT ROTH 2.5MMX230CM: Type: IMPLANTABLE DEVICE | Status: FUNCTIONAL

## 2022-04-15 DEVICE — KIT A-LINE 1LUM 20G X 12CM SAFE KIT: Type: IMPLANTABLE DEVICE | Status: FUNCTIONAL

## 2022-04-15 DEVICE — CATH THERMODIL PACE 7.5FR: Type: IMPLANTABLE DEVICE | Status: FUNCTIONAL

## 2022-04-15 DEVICE — KIT CVC 2LUM MAC 9FR CHG: Type: IMPLANTABLE DEVICE | Status: FUNCTIONAL

## 2022-04-15 NOTE — PRE PROCEDURE NOTE - PRE PROCEDURE EVALUATION
Attending Physician:              Abdoul Lang MD MSEd    Indication for Procedure :      chronic constipation               PAST MEDICAL & SURGICAL HISTORY:  H pylori ulcer  treated    Insomnia    Fibromyalgia    HPV (human papilloma virus) infection    Complex ovarian cyst    GERD (gastroesophageal reflux disease)    H/O oral surgery    S/P endoscopy  9/2020    History of salpingostomy          See Allscripts Note for further details  ALLERGIES:  latex (Rash (Mild to Mod); Urticaria (Mild to Mod))  No Known Drug Allergies    HOME MEDICATIONS:  magnesium citrate: 300 milligram(s) orally  Melatonin 2.5 mg oral capsule: 1 cap(s) orally once a day (at bedtime)      See Allscripts Note for further details    AICD/PPM: [ ] yes   [ X] no    PERTINENT LAB DATA:                      PHYSICAL EXAMINATION:    T(C): --  HR: --  BP: --  RR: --  SpO2: --    Constitutional: NAD  HEENT: PERRLA, EOMI,    Neck:  No JVD  Respiratory: CTAB/L  Cardiovascular: S1 and S2  Gastrointestinal: BS+, soft, NT/ND  Extremities: No peripheral edema  Neurological: A/O x 3, no focal deficits  Psychiatric: Normal mood, normal affect  Skin: No rashes    ASA Class: I [ ]  II [X ]  III [ ]  IV [ ]    COMMENTS:    The patient is a suitable candidate for the planned procedure unless box checked [ ]  No, explain:

## 2022-04-15 NOTE — ASU DISCHARGE PLAN (ADULT/PEDIATRIC) - NS MD DC FALL RISK RISK
For information on Fall & Injury Prevention, visit: [email or web address] OR  [email or web address] OR  https://www.cdc.gov/steadi/patient.html

## 2022-04-15 NOTE — ASU PATIENT PROFILE, ADULT - FALL HARM RISK - UNIVERSAL INTERVENTIONS
Bed in lowest position, wheels locked, appropriate side rails in place/Call bell, personal items and telephone in reach/Instruct patient to call for assistance before getting out of bed or chair/Non-slip footwear when patient is out of bed/[place] to call system/Physically safe environment - no spills, clutter or unnecessary equipment/Purposeful Proactive Rounding/Room/bathroom lighting operational, light cord in reach

## 2022-04-19 ENCOUNTER — APPOINTMENT (OUTPATIENT)
Dept: GASTROENTEROLOGY | Facility: HOSPITAL | Age: 47
End: 2022-04-19
Payer: COMMERCIAL

## 2022-04-19 ENCOUNTER — OUTPATIENT (OUTPATIENT)
Dept: OUTPATIENT SERVICES | Facility: HOSPITAL | Age: 47
LOS: 1 days | End: 2022-04-19
Payer: SELF-PAY

## 2022-04-19 VITALS
DIASTOLIC BLOOD PRESSURE: 73 MMHG | BODY MASS INDEX: 19.76 KG/M2 | SYSTOLIC BLOOD PRESSURE: 113 MMHG | TEMPERATURE: 97.3 F | HEIGHT: 59 IN | WEIGHT: 98 LBS | HEART RATE: 76 BPM

## 2022-04-19 DIAGNOSIS — Z98.890 OTHER SPECIFIED POSTPROCEDURAL STATES: Chronic | ICD-10-CM

## 2022-04-19 DIAGNOSIS — K31.9 DISEASE OF STOMACH AND DUODENUM, UNSPECIFIED: ICD-10-CM

## 2022-04-19 PROCEDURE — G0463: CPT

## 2022-04-19 PROCEDURE — ZZZZZ: CPT

## 2022-04-20 ENCOUNTER — RX RENEWAL (OUTPATIENT)
Age: 47
End: 2022-04-20

## 2022-04-20 DIAGNOSIS — K21.9 GASTRO-ESOPHAGEAL REFLUX DISEASE WITHOUT ESOPHAGITIS: ICD-10-CM

## 2022-04-20 DIAGNOSIS — R14.0 ABDOMINAL DISTENSION (GASEOUS): ICD-10-CM

## 2022-04-20 DIAGNOSIS — K59.09 OTHER CONSTIPATION: ICD-10-CM

## 2022-04-20 DIAGNOSIS — R10.9 UNSPECIFIED ABDOMINAL PAIN: ICD-10-CM

## 2022-04-20 NOTE — HISTORY OF PRESENT ILLNESS
[Nausea] : denies nausea [Vomiting] : denies vomiting [Diarrhea] : denies diarrhea [Constipation] : stable constipation [Yellow Skin Or Eyes (Jaundice)] : denies jaundice [Abdominal Pain] : stable abdominal pain [Abdominal Swelling] : abdominal swelling worsened [Rectal Pain] : denies rectal pain [Wt Loss ___ Lbs] : recent [unfilled] ~Upound(s) weight loss [Heartburn] : heartburn worsened [de-identified] : 46 year old woman with lactose intolerance, chronic constipation, chronic bloating, fibromyalgia, GERD and functional dyspepsia who presents for follow up of chronic GI problems.\par \par Patient underwent ARM 5 days ago. Result not available. \par Reports Famotidine didn't help her much with her  heartburn as before. She said amitriptyline improve her reflux but worsens her constipation.\par \par She has tried omeprazole before with some improvement  \par Reports no change in chronic symptoms since last visit. Still having small stools almost daily, but reports incomplete evacuation and persistent bloating after defecation. \par

## 2022-04-20 NOTE — ASSESSMENT
[FreeTextEntry1] : 46 year old female with history of fibromyalgia, chronic constipation, chronic abdominal bloating, chronic heartburn, and chronic left sided abdominal pain. She has had extensive workup with normal TSH, negative EGD, colonoscopy, hydrogen breath test and Sitz marker tests. She has self-discontinued Linzess [due to cost], Miralax [due to "dysuria"] and Benefiber/Psyllium husk [due to excess bloating]. Her heartburn, abdominal bloating and abdominal pain persist despite PPI therapy, FODMAP diet and supplemental fiber. Her heartburn has improved since starting TCA therapy. Differential diagnosis at this time most suspicious for functional gastrointestinal disorders (functional dyspepsia, hypersensitive esophagus) vs. pancreatic insufficie and borderline slow-transit constipation vs. pelvic floor dyssynergia.\par \par \par # Chronic abdominal pain - Unchanged on TCA, FODMAP diet, maximal PPI therapy, increased fiber. Ddx includes functional abdominal pain vs. less likely SIBO (borderline HBT July 2021) vs. IBS-Type C.\par # Hypersensitive esophagus - No evidence of esophagitis on EGD 2020; stable on low dose TCA \par # Obstipation- Associated with feeling of incomplete evacuation Improves with increased dietary fiber but at cost of increased bloat. Ddx includes pelvic floor dyssynergia, anorectal dysmotility and slow-transit constipation.\par \par Plan:\par - Trial of PPI \par - Return in 2-3 weeks after ARM result\par - FLEET enema and laxative PRN for worsening obstipation \par - cont amitriptyline 10 mg qd for  hypersensitive esophagus/abdominal pain \par - FODMAP diet/anti-reflux dietary modifications as tolerated\par - RTC after ARM completed\par \par

## 2022-04-20 NOTE — END OF VISIT
[] : Fellow [FreeTextEntry3] : 46F following for chronic constipation, chronic abdominal pain, bloating. \par Will rx trial of PPI for uncontrolled GERD. \par Continue bowel regimen. \par Await ARM results. \par Lifestyle modifications discussed

## 2022-04-21 ENCOUNTER — OUTPATIENT (OUTPATIENT)
Dept: OUTPATIENT SERVICES | Facility: HOSPITAL | Age: 47
LOS: 1 days | End: 2022-04-21
Payer: SELF-PAY

## 2022-04-21 ENCOUNTER — APPOINTMENT (OUTPATIENT)
Dept: INTERNAL MEDICINE | Facility: CLINIC | Age: 47
End: 2022-04-21
Payer: COMMERCIAL

## 2022-04-21 VITALS
BODY MASS INDEX: 20.16 KG/M2 | HEART RATE: 59 BPM | OXYGEN SATURATION: 99 % | SYSTOLIC BLOOD PRESSURE: 90 MMHG | WEIGHT: 100 LBS | DIASTOLIC BLOOD PRESSURE: 60 MMHG | HEIGHT: 59 IN

## 2022-04-21 DIAGNOSIS — K59.09 OTHER CONSTIPATION: ICD-10-CM

## 2022-04-21 DIAGNOSIS — Z98.890 OTHER SPECIFIED POSTPROCEDURAL STATES: Chronic | ICD-10-CM

## 2022-04-21 DIAGNOSIS — R10.9 UNSPECIFIED ABDOMINAL PAIN: ICD-10-CM

## 2022-04-21 DIAGNOSIS — R14.0 ABDOMINAL DISTENSION (GASEOUS): ICD-10-CM

## 2022-04-21 DIAGNOSIS — I10 ESSENTIAL (PRIMARY) HYPERTENSION: ICD-10-CM

## 2022-04-21 PROCEDURE — ZZZZZ: CPT

## 2022-04-21 PROCEDURE — G0463: CPT

## 2022-04-21 PROCEDURE — T1013: CPT

## 2022-04-21 NOTE — ASU PREOP CHECKLIST - NSWEIGHTCALCTOOLDRUG_GEN_A_CORE
Prenatal Visit    aMtthieu Nobles is a 29 y.o. female [id number] at 28w3d     The patient was seen and evaluated. Reports positive fetal movements. She denies headache, vision changes, RUQ pain, contractions, vaginal bleeding and leakage of fluid. The patient was instructed on fetal kick counts and was given a kick sheet to complete every 8 hours. She was instructed that the baby should move at a minimum of ten times within one hour after a meal. The patient was instructed to lay down on her left side twenty minutes after eating and count movements for up to one hour with a target value of ten movements. She was instructed to notify the office if she did not make that target after two attempts or if after any attempt there was less than four movements. The patient admits to that the targets have been made. The patient requested the T-Dap Vaccine (27-36 weeks) this pregnancy. The patient declined the influenza vaccine this year. The problem list reflects the active issues addressed during today's visit    Vitals:    BP: 118/72  Weight: 217 lb (98.4 kg)  Fundal Height (cm): 30 cm  Fetal Heart Rate: 129     28 Week Labs: The patient is RH +, Rhogam not indicated  ABO/Rh   Date Value Ref Range Status   12/02/2021 A POSITIVE  Final       3hr GTT: 86  180  147  131   28 week CBC:   Lab Results   Component Value Date    WBC 10.6 04/01/2022    HGB 12.5 04/01/2022    HCT 38.6 04/01/2022    MCV 98.5 04/01/2022     04/01/2022     UA w/ Ur C&S: neg     Assessment & Plan:  Matthieu Nobles is a 29 y.o. female [id number] at 29w2d   - 28 week labs completed   - discussed recommendations for TDAP immunization, patient requested TDAP. - Next [facility] appointment 5/11/22   - The ACIP recommended pregnant patients be included in phase 1C of vaccine distribution. This decision is supported by [facility] and ACOG.  As of February 16, 2021, there have been over 30,000 pregnant patients included in the V-safe post COVID vaccination safety . Most (73%) reports to VAERS among pregnant women involved non-pregnancyspecific adverse events (e.g., local and systemic reactions). Miscarriage was the most frequently reported pregnancy-specific adverse event to VAERS; numbers are within the known background rates based on presumed COVID-19 vaccine doses administered to pregnant women. No unexpected pregnancy or infant outcomes have been observed related to  COVID-19 vaccination during pregnancy. Recommended patient proceed with vaccination. Patient Active Problem List    Diagnosis Date Noted    Elv early 1 hr GTT, early 3 hr wnl, 28 week 3 hr WNL 02/10/2022     Needs repeat 3 hr GTT at 28 weeks WNL      BMI 35 11/18/2019    FHx Breast Cancer 04/18/2018     Return in about 2 weeks (around 5/6/2022) for DO [facility]   4/22/2022, 10:07 AM  used

## 2022-04-22 NOTE — HISTORY OF PRESENT ILLNESS
[Other: ______] : provided by SHWETA [Time Spent: ____ minutes] : Total time spent using  services: [unfilled] minutes. The patient's primary language is not English thus required  services. [FreeTextEntry8] : 45 y/o female with pmh of H. pylori, lactose intolerance, chronic constipation, functional dyspepsia, chronic throat pain, and fibormyalgia who presents in clinic for follow up with recent mechanical fall and other chronic concerns. \par \par Fell Tuesday  missed a step. Now with neck pain, R wrist pain. No knee strike in the fall. Hit head with no headache or focal deficits after incident. Additionally hit both R and L wrist with some residual discomfort but able to move the distal extremities without issue. \par \par Knee swelling - 2016 with initial knee pain. 2018 provided picture of left knee pain. Left side always worse. Heel pain additionally but without radiation from knee directly to ankle. Fish oil has helped and reducing processed carbohydrates/sugars has been helpful. Osteobiflex has helped with pain however worsens diarrhea. No tylenol or ibuprofen. 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  When swollen will also be hot. Worse in winter time and worse in summer time. No changes in work schedule in that time. 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 Additionally with neck pain especially L sided since fall. With previous rheumtology. Meloxicam prescribed but did not take. No cortisone desired. With swelling of fingers in the morning, and with swelling of great toe and a little in the other toes. May-June has appointment with nutritionist. \par \par Mood- Depression without hope given stomach symptoms. Previously [facility] [employer] [facility] has not been able to go due to the pandemic. Since 24 years old has had difficulty sleeping. 3-6h per night without naps although states has improved. Notes previously needed melatonin to sleep. Dr. Colletti aided her a lot with fibromyalgia. Notes that therapy improved her symptoms in the past. \par \par GI distress- Weight loss from 106-108 to 98lbs June 2020 noticed because skirt did not fit properly. Breakfast: sweet potato, oatmeal, blueberry Lunch tortilla with zucchini potatoes chicken stew Dinner fish eggs avocado pepper. Has had periods with abdominal cramping follows with GI for this issue.

## 2022-04-22 NOTE — REVIEW OF SYSTEMS
[Fatigue] : fatigue [Abdominal Pain] : abdominal pain [Diarrhea] : diarrhea [Heartburn] : heartburn [Joint Pain] : joint pain [Joint Stiffness] : joint stiffness [Joint Swelling] : joint swelling [Muscle Pain] : muscle pain [Back Pain] : back pain [Headache] : headache [Fever] : no fever [Chills] : no chills [Chest Pain] : no chest pain [Palpitations] : no palpitations [Shortness Of Breath] : no shortness of breath [Cough] : no cough [Nausea] : no nausea [Vomiting] : no vomiting

## 2022-04-22 NOTE — PHYSICAL EXAM
[No Acute Distress] : no acute distress [Well Nourished] : well nourished [Well Developed] : well developed [PERRL] : pupils equal round and reactive to light [EOMI] : extraocular movements intact [No Respiratory Distress] : no respiratory distress  [No Accessory Muscle Use] : no accessory muscle use [Clear to Auscultation] : lungs were clear to auscultation bilaterally [Normal Rate] : normal rate  [Regular Rhythm] : with a regular rhythm [Normal S1, S2] : normal S1 and S2 [Soft] : abdomen soft [Non Tender] : non-tender [Non-distended] : non-distended [No HSM] : no HSM [Normal Bowel Sounds] : normal bowel sounds [Coordination Grossly Intact] : coordination grossly intact [No Focal Deficits] : no focal deficits The patient is a 85y Male complaining of [Normal Gait] : normal gait [de-identified] : tenderness at medial patella L side, tenderness at joint line lateral aspect L patella [de-identified] : anxious affect

## 2022-04-22 NOTE — PLAN
[FreeTextEntry1] : #Diffuse muscle aches\par -most likely fibromyalgia previously evaluated by rheumatology \par -[facility] referral, encourage exercise to relieve existing symptoms\par -Encourage healthy sleep habits continue home melatonin\par -Referral to rheumatology for further evaluation\par -Behavioral health with Dr. Shaw. \par \par #Chronic abdominal pain\par -functional abdominal pain vs SIBO vs IBS-C\par -Continues to follow up with GI \par -To begin PPI, has not yet started therapy\par -Will provide levsin for cramping abdominal pain, patient can initiate and discuss this medication further with GI\par \par Follow up with behavioral health Dr. Shaw and follow up with clinic PRN

## 2022-04-22 NOTE — ASSESSMENT
[FreeTextEntry1] : 47 y/o female with pmh of H. pylori, lactose intolerance, chronic constipation, functional dyspepsia, chronic throat pain, and fibormyalgia who presents in clinic for follow up after mechanical fall additionally with knee pain and GI distress.

## 2022-04-25 DIAGNOSIS — M79.7 FIBROMYALGIA: ICD-10-CM

## 2022-05-03 ENCOUNTER — OUTPATIENT (OUTPATIENT)
Dept: OUTPATIENT SERVICES | Facility: HOSPITAL | Age: 47
LOS: 1 days | End: 2022-05-03
Payer: SELF-PAY

## 2022-05-03 ENCOUNTER — APPOINTMENT (OUTPATIENT)
Dept: GASTROENTEROLOGY | Facility: HOSPITAL | Age: 47
End: 2022-05-03
Payer: COMMERCIAL

## 2022-05-03 VITALS
HEART RATE: 75 BPM | WEIGHT: 98 LBS | TEMPERATURE: 98 F | BODY MASS INDEX: 19.76 KG/M2 | DIASTOLIC BLOOD PRESSURE: 75 MMHG | HEIGHT: 59 IN | SYSTOLIC BLOOD PRESSURE: 114 MMHG | RESPIRATION RATE: 14 BRPM

## 2022-05-03 DIAGNOSIS — K31.9 DISEASE OF STOMACH AND DUODENUM, UNSPECIFIED: ICD-10-CM

## 2022-05-03 DIAGNOSIS — Z98.890 OTHER SPECIFIED POSTPROCEDURAL STATES: Chronic | ICD-10-CM

## 2022-05-03 PROCEDURE — ZZZZZ: CPT

## 2022-05-03 PROCEDURE — G0463: CPT

## 2022-05-03 RX ORDER — FAMOTIDINE 20 MG/1
20 TABLET, FILM COATED ORAL AT BEDTIME
Qty: 30 | Refills: 1 | Status: DISCONTINUED | COMMUNITY
Start: 2020-08-25 | End: 2022-05-03

## 2022-05-03 RX ORDER — PANTOPRAZOLE 20 MG/1
20 TABLET, DELAYED RELEASE ORAL DAILY
Qty: 30 | Refills: 2 | Status: DISCONTINUED | COMMUNITY
Start: 2022-04-19 | End: 2022-05-03

## 2022-05-03 RX ORDER — HYOSCYAMINE SULFATE 0.12 MG/1
0.12 TABLET, ORALLY DISINTEGRATING ORAL 3 TIMES DAILY
Qty: 42 | Refills: 0 | Status: DISCONTINUED | COMMUNITY
Start: 2022-04-21 | End: 2022-05-03

## 2022-05-05 DIAGNOSIS — R12 HEARTBURN: ICD-10-CM

## 2022-05-05 DIAGNOSIS — R10.9 UNSPECIFIED ABDOMINAL PAIN: ICD-10-CM

## 2022-05-05 DIAGNOSIS — R14.0 ABDOMINAL DISTENSION (GASEOUS): ICD-10-CM

## 2022-05-05 DIAGNOSIS — K59.09 OTHER CONSTIPATION: ICD-10-CM

## 2022-05-05 NOTE — ASSESSMENT
[FreeTextEntry1] : 45 year old female with history of fibromyalgia, chronic constipation, chronic abdominal bloating, chronic heartburn, and chronic left sided abdominal pain. She has had a fairly extensive workup with normal TSH, negative EGD, colonoscopy, hydrogen breath test and Sitz marker tests. She has self-discontinued Linzess [due to cost], Miralax [due to "dysuria"] and Benefiber/Psyllium husk [due to excess bloating]. Her heartburn, abdominal bloating and abdominal pain persist despite PPI therapy, FODMAP diet and supplemental fiber. Her heartburn has improved since starting TCA therapy. Differential diagnosis at this time most suspicious for functional gastrointestinal disorders (functional dyspepsia, functional heartburn) and pelvic floor dyssynergia vs. functional constipation. \par \par # Chronic abdominal pain - Unchanged on TCA, FODMAP diet, maximal PPI therapy, increased fiber. Ddx includes functional abdominal pain or IBS type C, less likely SIBO (borderline HBT July 2021).\par # Functional heartburn - No esophagitis on EGD 2020; heartburn/reflux in throat previously improved on amitriptyline but patient discontinued in March 2022 due to worsening obstipation. \par # Obstipation- Associated with feeling of incomplete evacuation Improves with increased dietary fiber but at cost of increased bloating. Ddx includes pelvic floor dyssynergia/outlet dysfunction vs. functional constipation. Less likely slow-transit constipation given negative Sitz chris test. \par \par Plan\par - will follow up on ARM report\par - start Benefibre 1-2 times daily today to bulk stools\par - continue tap water enemas and laxative PRN for obstipation\par - resume amitriptyline at 25 mg PO daily 3-4 days after starting Benefibre \par - follow up in 1 month to reassess symptoms\par \par \par D/w Dr. Sanchez\par \par GUALBERTO Moon MD\par GI Fellow

## 2022-05-05 NOTE — HISTORY OF PRESENT ILLNESS
[Heartburn] : heartburn worsened [Nausea] : denies nausea [Vomiting] : denies vomiting [Diarrhea] : denies diarrhea [Constipation] : constipation worsened [Yellow Skin Or Eyes (Jaundice)] : denies jaundice [Abdominal Pain] : denies abdominal pain [Abdominal Swelling] : denies abdominal swelling [de-identified] : 46 year old woman with lactose intolerance, chronic constipation, fibromyalgia and functional dyspepsia and functional heartburn who presents for follow up of chronic GI problems.\par \par Patient last seen 3/1/22 after ARM however results not reported at that time. ARM report still not yet available. She was also prescribed dicyclomine and pantoprazole by PCP in April but did not take either medication.\par \par Today she states constipation is worse. Like on previous visits, reports sticky and malodorous stools. She is taking a new Italian-made supplement to improve digestion (ingredients reviewed) which she states is helping with bowel movements.  \par \par She stopped taking amitriptyline, which had improved her heartburn/regurgitation, due to perceived worsening constipation. Now reports increased burning/acid reflux in her throat. She is amenable to re-starting medication with fibre supplement. [de-identified] : Bloating unchanged

## 2022-05-05 NOTE — REVIEW OF SYSTEMS
[As Noted in HPI] : as noted in HPI [Incontinence] : incontinence [Anxiety] : anxiety [Fever] : no fever [Chills] : no chills [Sore Throat] : no sore throat [Chest Pain] : no chest pain [Lower Ext Edema] : no lower extremity edema [Cough] : no cough [Arthralgias] : no arthralgias [Joint Swelling] : no joint swelling [Skin Lesions] : no skin lesions [Dizziness] : no dizziness [Easy Bleeding] : no tendency for easy bleeding [Easy Bruising] : no tendency for easy bruising

## 2022-05-05 NOTE — PHYSICAL EXAM
ASP - DC Therapy - no infection [General Appearance - Alert] : alert [Sclera] : the sclera and conjunctiva were normal [Oropharynx] : the oropharynx was normal [Respiration, Rhythm And Depth] : normal respiratory rhythm and effort [Heart Rate And Rhythm] : heart rate was normal and rhythm regular [Edema] : there was no peripheral edema [Abdomen Tenderness] : non-tender [Abdomen Mass (___ Cm)] : no abdominal mass palpated [Cervical Lymph Nodes Enlarged Posterior Bilaterally] : posterior cervical [Cervical Lymph Nodes Enlarged Anterior Bilaterally] : anterior cervical [Motor Tone] : muscle strength and tone were normal [] : no rash [Motor Exam] : the motor exam was normal [FreeTextEntry1] : Abdomen softly distended

## 2022-05-11 ENCOUNTER — NON-APPOINTMENT (OUTPATIENT)
Age: 47
End: 2022-05-11

## 2022-05-11 ENCOUNTER — APPOINTMENT (OUTPATIENT)
Dept: INTERNAL MEDICINE | Facility: CLINIC | Age: 47
End: 2022-05-11

## 2022-05-12 ENCOUNTER — OUTPATIENT (OUTPATIENT)
Dept: OUTPATIENT SERVICES | Facility: HOSPITAL | Age: 47
LOS: 1 days | End: 2022-05-12
Payer: SELF-PAY

## 2022-05-12 ENCOUNTER — APPOINTMENT (OUTPATIENT)
Dept: INTERNAL MEDICINE | Facility: CLINIC | Age: 47
End: 2022-05-12
Payer: COMMERCIAL

## 2022-05-12 VITALS
HEART RATE: 78 BPM | HEIGHT: 59 IN | BODY MASS INDEX: 20.16 KG/M2 | DIASTOLIC BLOOD PRESSURE: 78 MMHG | OXYGEN SATURATION: 99 % | SYSTOLIC BLOOD PRESSURE: 122 MMHG | WEIGHT: 100 LBS

## 2022-05-12 DIAGNOSIS — R07.0 PAIN IN THROAT: ICD-10-CM

## 2022-05-12 DIAGNOSIS — Z98.890 OTHER SPECIFIED POSTPROCEDURAL STATES: Chronic | ICD-10-CM

## 2022-05-12 DIAGNOSIS — I10 ESSENTIAL (PRIMARY) HYPERTENSION: ICD-10-CM

## 2022-05-12 PROCEDURE — ZZZZZ: CPT

## 2022-05-12 PROCEDURE — T1013: CPT

## 2022-05-12 PROCEDURE — G0463: CPT

## 2022-05-13 PROBLEM — R07.0 THROAT DISCOMFORT: Status: ACTIVE | Noted: 2021-06-14

## 2022-05-13 NOTE — PLAN
[FreeTextEntry1] : 46 year old female, PMH fibromyalgia, chronic constipation presenting with one year of sore throat. Has had extensive workup from ENT and GI, including endoscopy and flexible laryngoscopy, hydrogen breath test. Most of workup c/w reflux/LPR, however, patient has not responded to PPI or famotidine. Reflux responded to amitriptyline, however, believes it worsens her diarrhea so hasn't been taking. \par \par Plan: \par -will trial sucralfate\par -encouraged to restart amitriptyline. Discussed potentially switching to duloxetine, however, patient would prefer to restart amitriptyline\par -encouraged to restart PPI \par -offered lidocaine throat spray, patient declined  \par -encouraged to take Biotin that has been previously prescribed for dry throat \par -return to GI for further management of constipation

## 2022-05-13 NOTE — HISTORY OF PRESENT ILLNESS
[[employer] ] : provided by [employer]   [Time Spent: ____ minutes] : Total time spent using  services: [unfilled] minutes. The patient's primary language is not English thus required  services. [FreeTextEntry1] : FU [Interpreters_IDNumber] : [id number] [de-identified] : Ms. Arce is a 46 year old female, PMH chronic constipation, functional GI disorder, fibromyalgia, presenting for FU. \par \par She has been experiencing a sore throat for the past year. Feels like her throat is dry, feels pain up to her ear and head. Can't sleep at night because it hurts to swallow. Says it feels like her throat is being stabbed with knives.  Also coughs up thick phlegm, sometimes green or brown. Nothing makes it better. Saw ENT in the past who attributed this to her reflux, Prescribed amitriptyline for GI issues but hasn't been taking it because she thinks it worsened her constipation (looked it up and saw it is usually prescribed for IBS-D, not IBS-C). Says she tried pantoprazole and omeprazole in the past, didn't help (stopped after one week  because thinks it did not help was taking it every day 30 minutes before breakfast). Has also tried allergy medication in the past as well. She is very frustrated and distressed by all of her GI issues.

## 2022-05-13 NOTE — END OF VISIT
[FreeTextEntry3] : 46yoF with PMhx of GERD who presents for throat pain. Has previously seen ENT and GI and told throat pain is likely 2/2 uncontrolled reflux. Currently on H2 blocker, stopped PPI due to ineffectiveness and stopped amitriptyline due to worsened diarrhea. Advised adherence to meds and return to GI. Agree with sulcrafate trial given refractory sx.

## 2022-05-13 NOTE — PHYSICAL EXAM
[Normal] : normal gait, coordination grossly intact, no focal deficits and deep tendon reflexes were 2+ and symmetric [de-identified] : distended, hard. diffusely TTP

## 2022-05-17 DIAGNOSIS — R07.0 PAIN IN THROAT: ICD-10-CM

## 2022-05-27 ENCOUNTER — APPOINTMENT (OUTPATIENT)
Dept: MAMMOGRAPHY | Facility: IMAGING CENTER | Age: 47
End: 2022-05-27
Payer: COMMERCIAL

## 2022-05-27 ENCOUNTER — APPOINTMENT (OUTPATIENT)
Dept: ULTRASOUND IMAGING | Facility: IMAGING CENTER | Age: 47
End: 2022-05-27
Payer: COMMERCIAL

## 2022-05-27 ENCOUNTER — OUTPATIENT (OUTPATIENT)
Dept: OUTPATIENT SERVICES | Facility: HOSPITAL | Age: 47
LOS: 1 days | End: 2022-05-27
Payer: SELF-PAY

## 2022-05-27 ENCOUNTER — RESULT REVIEW (OUTPATIENT)
Age: 47
End: 2022-05-27

## 2022-05-27 DIAGNOSIS — Z98.890 OTHER SPECIFIED POSTPROCEDURAL STATES: Chronic | ICD-10-CM

## 2022-05-27 DIAGNOSIS — N83.292 OTHER OVARIAN CYST, LEFT SIDE: ICD-10-CM

## 2022-05-27 PROCEDURE — 77063 BREAST TOMOSYNTHESIS BI: CPT | Mod: 26

## 2022-05-27 PROCEDURE — 76856 US EXAM PELVIC COMPLETE: CPT | Mod: 26

## 2022-05-27 PROCEDURE — 76641 ULTRASOUND BREAST COMPLETE: CPT | Mod: 26,50

## 2022-05-27 PROCEDURE — 76830 TRANSVAGINAL US NON-OB: CPT

## 2022-05-27 PROCEDURE — 77067 SCR MAMMO BI INCL CAD: CPT | Mod: 26

## 2022-05-27 PROCEDURE — 77063 BREAST TOMOSYNTHESIS BI: CPT

## 2022-05-27 PROCEDURE — 76856 US EXAM PELVIC COMPLETE: CPT

## 2022-05-27 PROCEDURE — 77067 SCR MAMMO BI INCL CAD: CPT

## 2022-05-27 PROCEDURE — 76641 ULTRASOUND BREAST COMPLETE: CPT

## 2022-05-27 PROCEDURE — 76830 TRANSVAGINAL US NON-OB: CPT | Mod: 26

## 2022-06-02 ENCOUNTER — NON-APPOINTMENT (OUTPATIENT)
Age: 47
End: 2022-06-02

## 2022-06-03 ENCOUNTER — NON-APPOINTMENT (OUTPATIENT)
Age: 47
End: 2022-06-03

## 2022-06-06 ENCOUNTER — RESULT REVIEW (OUTPATIENT)
Age: 47
End: 2022-06-06

## 2022-06-06 ENCOUNTER — APPOINTMENT (OUTPATIENT)
Dept: ULTRASOUND IMAGING | Facility: IMAGING CENTER | Age: 47
End: 2022-06-06
Payer: COMMERCIAL

## 2022-06-06 ENCOUNTER — OUTPATIENT (OUTPATIENT)
Dept: OUTPATIENT SERVICES | Facility: HOSPITAL | Age: 47
LOS: 1 days | End: 2022-06-06
Payer: SELF-PAY

## 2022-06-06 DIAGNOSIS — Z98.890 OTHER SPECIFIED POSTPROCEDURAL STATES: Chronic | ICD-10-CM

## 2022-06-06 DIAGNOSIS — Z00.8 ENCOUNTER FOR OTHER GENERAL EXAMINATION: ICD-10-CM

## 2022-06-06 PROCEDURE — 76642 ULTRASOUND BREAST LIMITED: CPT

## 2022-06-06 PROCEDURE — 76642 ULTRASOUND BREAST LIMITED: CPT | Mod: 26,LT

## 2022-06-10 DIAGNOSIS — N63.0 UNSPECIFIED LUMP IN UNSPECIFIED BREAST: ICD-10-CM

## 2022-06-14 ENCOUNTER — RESULT REVIEW (OUTPATIENT)
Age: 47
End: 2022-06-14

## 2022-06-14 ENCOUNTER — APPOINTMENT (OUTPATIENT)
Dept: INTERNAL MEDICINE | Facility: CLINIC | Age: 47
End: 2022-06-14

## 2022-06-14 ENCOUNTER — OUTPATIENT (OUTPATIENT)
Dept: OUTPATIENT SERVICES | Facility: HOSPITAL | Age: 47
LOS: 1 days | End: 2022-06-14
Payer: SELF-PAY

## 2022-06-14 ENCOUNTER — APPOINTMENT (OUTPATIENT)
Dept: ULTRASOUND IMAGING | Facility: IMAGING CENTER | Age: 47
End: 2022-06-14
Payer: COMMERCIAL

## 2022-06-14 ENCOUNTER — NON-APPOINTMENT (OUTPATIENT)
Age: 47
End: 2022-06-14

## 2022-06-14 DIAGNOSIS — N63.0 UNSPECIFIED LUMP IN UNSPECIFIED BREAST: ICD-10-CM

## 2022-06-14 DIAGNOSIS — Z98.890 OTHER SPECIFIED POSTPROCEDURAL STATES: Chronic | ICD-10-CM

## 2022-06-14 PROCEDURE — 88305 TISSUE EXAM BY PATHOLOGIST: CPT | Mod: 26

## 2022-06-14 PROCEDURE — 88305 TISSUE EXAM BY PATHOLOGIST: CPT

## 2022-06-14 PROCEDURE — 19083 BX BREAST 1ST LESION US IMAG: CPT | Mod: LT

## 2022-06-14 PROCEDURE — 77065 DX MAMMO INCL CAD UNI: CPT

## 2022-06-14 PROCEDURE — 19083 BX BREAST 1ST LESION US IMAG: CPT

## 2022-06-14 PROCEDURE — 77065 DX MAMMO INCL CAD UNI: CPT | Mod: 26,LT

## 2022-06-14 PROCEDURE — A4648: CPT

## 2022-06-17 LAB — SURGICAL PATHOLOGY STUDY: SIGNIFICANT CHANGE UP

## 2022-06-19 ENCOUNTER — EMERGENCY (EMERGENCY)
Facility: HOSPITAL | Age: 47
LOS: 1 days | Discharge: HOME | End: 2022-06-19
Attending: EMERGENCY MEDICINE
Payer: MEDICAID

## 2022-06-19 VITALS
TEMPERATURE: 98 F | OXYGEN SATURATION: 100 % | SYSTOLIC BLOOD PRESSURE: 116 MMHG | RESPIRATION RATE: 18 BRPM | HEART RATE: 85 BPM | DIASTOLIC BLOOD PRESSURE: 74 MMHG

## 2022-06-19 VITALS
TEMPERATURE: 98 F | DIASTOLIC BLOOD PRESSURE: 69 MMHG | HEART RATE: 88 BPM | WEIGHT: 100.09 LBS | HEIGHT: 60 IN | OXYGEN SATURATION: 100 % | SYSTOLIC BLOOD PRESSURE: 126 MMHG | RESPIRATION RATE: 18 BRPM

## 2022-06-19 DIAGNOSIS — Z98.890 OTHER SPECIFIED POSTPROCEDURAL STATES: Chronic | ICD-10-CM

## 2022-06-19 PROCEDURE — 99283 EMERGENCY DEPT VISIT LOW MDM: CPT

## 2022-06-19 RX ORDER — CYCLOBENZAPRINE HYDROCHLORIDE 10 MG/1
10 TABLET, FILM COATED ORAL ONCE
Refills: 0 | Status: COMPLETED | OUTPATIENT
Start: 2022-06-19 | End: 2022-06-19

## 2022-06-19 RX ORDER — ACETAMINOPHEN 500 MG
650 TABLET ORAL ONCE
Refills: 0 | Status: COMPLETED | OUTPATIENT
Start: 2022-06-19 | End: 2022-06-19

## 2022-06-19 RX ORDER — METHOCARBAMOL 500 MG/1
2 TABLET, FILM COATED ORAL
Qty: 30 | Refills: 0
Start: 2022-06-19 | End: 2022-06-23

## 2022-06-19 RX ORDER — LIDOCAINE 4 G/100G
1 CREAM TOPICAL ONCE
Refills: 0 | Status: COMPLETED | OUTPATIENT
Start: 2022-06-19 | End: 2022-06-19

## 2022-06-19 RX ORDER — ACETAMINOPHEN 500 MG
2 TABLET ORAL
Qty: 40 | Refills: 0
Start: 2022-06-19 | End: 2022-06-23

## 2022-06-19 RX ADMIN — Medication 650 MILLIGRAM(S): at 09:15

## 2022-06-19 RX ADMIN — LIDOCAINE 1 PATCH: 4 CREAM TOPICAL at 08:37

## 2022-06-19 RX ADMIN — CYCLOBENZAPRINE HYDROCHLORIDE 10 MILLIGRAM(S): 10 TABLET, FILM COATED ORAL at 08:37

## 2022-06-19 RX ADMIN — Medication 650 MILLIGRAM(S): at 08:37

## 2022-06-19 NOTE — ED PROVIDER NOTE - NSFOLLOWUPINSTRUCTIONS_ED_ALL_ED_FT
Please follow up with your primary care provider for further concerns you may have regarding your general health. Attached you will find your results from today's visit. Continue taking your medications as prescribed and keep your upcoming medical appointments.    [facility]  Orthopedic Surgery  [address] [place] [address]  Phone: [phone]  Fax:   Follow Up Time:     [facility]  Orthopedic Surgery  [address], 3rd & 4th [place] [address]  Phone: [phone]  Fax:   Follow Up Time:     [facility] [place]  Orthopedics  .  [place]   Phone: [phone]  Fax:   Follow Up Time:     [facility]  Orthopedics  [address] [place] [address]  Phone: [phone]  Fax: [phone]  Follow Up Time:     [facility]  Orthopedics  [address] [place] [address]  Phone: [phone]  Fax:   Follow Up Time:     Back Pain  WHAT YOU NEED TO KNOW:  Back pain is common. It can be caused by many conditions, such as arthritis or the breakdown of spinal discs. Your risk for back pain is increased by injuries, lack of activity, or repeated bending and twisting. You may feel sore or stiff on one or both sides of your back. The pain may spread to your buttocks or thighs.  DISCHARGE INSTRUCTIONS:  Return to the emergency department if:   •You have pain, numbness, or weakness in one or both legs.  •Your pain becomes so severe that you cannot walk.  •You cannot control your urine or bowel movements.  •You have severe back pain with chest pain.  •You have severe back pain, nausea, and vomiting.  •You have severe back pain that spreads to your side or genital area.  Contact your healthcare provider if:   •You have back pain that does not get better with rest and pain medicine.  •You have a fever.  •You have pain that worsens when you are on your back or when you rest.  •You have pain that worsens when you cough or sneeze.  •You lose weight without trying.  •You have questions or concerns about your condition or care.  Medicines:   •NSAIDs help decrease swelling and pain. This medicine is available with or without a doctor's order. NSAIDs can cause stomach bleeding or kidney problems in certain people. If you take blood thinner medicine, always ask your healthcare provider if NSAIDs are safe for you. Always read the medicine label and follow directions.  •Acetaminophen decreases pain and fever. It is available without a doctor's order. Ask how much to take and how often to take it. Follow directions. Read the labels of all other medicines you are using to see if they also contain acetaminophen, or ask your doctor or pharmacist. Acetaminophen can cause liver damage if not taken correctly. Do not use more than 4 grams (4,000 milligrams) total of acetaminophen in one day.   •Muscle relaxers help decrease muscle spasms and back pain.  •Prescription pain medicine may be given. Ask your healthcare provider how to take this medicine safely. Some prescription pain medicines contain acetaminophen. Do not take other medicines that contain acetaminophen without talking to your healthcare provider. Too much acetaminophen may cause liver damage. Prescription pain medicine may cause constipation. Ask your healthcare provider how to prevent or treat constipation.   •Take your medicine as directed. Contact your healthcare provider if you think your medicine is not helping or if you have side effects. Tell him or her if you are allergic to any medicine. Keep a list of the medicines, vitamins, and herbs you take. Include the amounts, and when and why you take them. Bring the list or the pill bottles to follow-up visits. Carry your medicine list with you in case of an emergency.  How to manage your back pain:   •Apply ice on your back for 15 to 20 minutes every hour or as directed. Use an ice pack, or put crushed ice in a plastic bag. Cover it with a towel before you apply it to your skin. Ice helps prevent tissue damage and decreases pain.  •Apply heat on your back for 20 to 30 minutes every 2 hours for as many days as directed. Heat helps decrease pain and muscle spasms.  •Stay active as much as you can without causing more pain. Bed rest could make your back pain worse. Avoid heavy lifting until your pain is gone.  •Go to physical therapy as directed. A physical therapist can teach you exercises to help improve movement and strength, and to decrease pain.  Follow up with your healthcare provider in 2 weeks, or as directed: Write down your questions so you remember to ask them during your visits.

## 2022-06-19 NOTE — ED PROVIDER NOTE - CLINICAL SUMMARY MEDICAL DECISION MAKING FREE TEXT BOX
Nacho PGY3: pain likely c/w possible disk herniation vs acute back strain. No danger signs for cordal etiology, will dc w/ symptomatic support and ortho follow up.

## 2022-06-19 NOTE — ED PROVIDER NOTE - ATTENDING CONTRIBUTION TO CARE
MDM: patient with atraumatic bilateral low back pain. 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 No red flags or neuro deficits. Denies Hx of spine surgery, immunosuppression conditions, cancer, recent bacterial infection, or osteoporosis. Denies fevers, chills, weight loss, pain worse at night, urinary retention, incontinence of bowel or bladder, saddle anesthesia, lower extremity weakness or numbness, and no recent trauma or falls.  Low utility for imaging in the ED.   No systemic, GI or  symptoms.   Will provide pain control and close follow-up with PMD and Spine specialist. Will require outpatient work-up and treatment.     Patient reassessed and reports improved symptoms. Repeat spine examination shows improved ROM, and motor and sensation intact L2-S1. Ambulatory in the ED with no antalgic gait and no assistance. Stable for discharge with close follow-up and strict return precautions. The patient has been informed of all concerning signs and symptoms to return to Emergency Department, the necessity to follow up with PMD and Spine within 3-5 days was explained, and the patient reports understanding of above with capacity and insight.

## 2022-06-19 NOTE — ED PROVIDER NOTE - PHYSICAL EXAMINATION
Gen: WDWN, NAD  HEENT: EOMI, no nasal discharge, mucous membranes moist  CV: RRR, +S1/S2, no M/R/G  Resp: CTAB, no W/R/R  GI: Abdomen soft non-distended, NTTP, no masses  MSK: + b/l lumbar pain no midline ttp, - SLR b/l distal pulses/sensation intact b/l   Neuro: A&Ox4, following commands, moving all four extremities spontaneously  Psych: appropriate mood, affect Gen: WDWN, NAD  HEENT: EOMI, no nasal discharge, mucous membranes moist  CV: RRR, +S1/S2, no M/R/G  Resp: CTAB, no W/R/R  GI: Abdomen soft non-distended, NTTP, no masses  MSK: + b/l lumbar paraspinal ttp, but no midline ttp, - SLR b/l distal pulses/sensation intact b/l   Neuro: A&Ox4, following commands, moving all four extremities spontaneously  Psych: appropriate mood, affect

## 2022-06-19 NOTE — ED PROVIDER NOTE - PATIENT PORTAL LINK FT
You can access the FollowMyHealth Patient Portal offered by [facility] by registering at the following website: [email or web address]. By joining [employer]’s FollowMyHealth portal, you will also be able to view your health information using other applications (apps) compatible with our system.

## 2022-06-19 NOTE — ED PROVIDER NOTE - OBJECTIVE STATEMENT
Nacho PGY3: 46F denies hx p/w back pain. 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 Endorses persistent pain radiation to b/l knees w/ flexion of back. Denies any numbness/ting to LE, no knee/ankle injury. No loss of sensation to LE.  P Pt is still able to ambulate, but w/ difficulty. Denies urinary incontinence/hesitance, dysuria/hematuria. Denies constipation/diarrhea. Denies focal weakness or loss of sensation. Denies fever, IVDU, other neuro sx. Has not tried any medications.

## 2022-06-19 NOTE — ED ADULT NURSE NOTE - OBJECTIVE STATEMENT
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

## 2022-06-19 NOTE — ED PROVIDER NOTE - NSFOLLOWUPCLINICS_GEN_ALL_ED_FT
[facility] Orthopedic Surgery  Orthopedic Surgery  [address], 3rd & 4th [place] [address]  Phone: [phone]  Fax:   Follow Up Time: 4-6 Days

## 2022-06-22 ENCOUNTER — APPOINTMENT (OUTPATIENT)
Dept: MRI IMAGING | Facility: CLINIC | Age: 47
End: 2022-06-22

## 2022-06-22 ENCOUNTER — OUTPATIENT (OUTPATIENT)
Dept: OUTPATIENT SERVICES | Facility: HOSPITAL | Age: 47
LOS: 1 days | End: 2022-06-22
Payer: SELF-PAY

## 2022-06-22 DIAGNOSIS — Z98.890 OTHER SPECIFIED POSTPROCEDURAL STATES: Chronic | ICD-10-CM

## 2022-06-22 DIAGNOSIS — Z00.8 ENCOUNTER FOR OTHER GENERAL EXAMINATION: ICD-10-CM

## 2022-06-22 PROCEDURE — 72195 MRI PELVIS W/O DYE: CPT | Mod: 26

## 2022-06-22 PROCEDURE — 72195 MRI PELVIS W/O DYE: CPT

## 2022-06-23 ENCOUNTER — APPOINTMENT (OUTPATIENT)
Dept: DERMATOLOGY | Facility: HOSPITAL | Age: 47
End: 2022-06-23

## 2022-06-27 ENCOUNTER — NON-APPOINTMENT (OUTPATIENT)
Age: 47
End: 2022-06-27

## 2022-07-05 ENCOUNTER — APPOINTMENT (OUTPATIENT)
Dept: OBGYN | Facility: CLINIC | Age: 47
End: 2022-07-05

## 2022-07-05 ENCOUNTER — OUTPATIENT (OUTPATIENT)
Dept: OUTPATIENT SERVICES | Facility: HOSPITAL | Age: 47
LOS: 1 days | End: 2022-07-05
Payer: SELF-PAY

## 2022-07-05 VITALS — DIASTOLIC BLOOD PRESSURE: 80 MMHG | BODY MASS INDEX: 21.81 KG/M2 | WEIGHT: 108 LBS | SYSTOLIC BLOOD PRESSURE: 130 MMHG

## 2022-07-05 DIAGNOSIS — Z98.890 OTHER SPECIFIED POSTPROCEDURAL STATES: Chronic | ICD-10-CM

## 2022-07-05 DIAGNOSIS — N76.0 ACUTE VAGINITIS: ICD-10-CM

## 2022-07-05 PROCEDURE — G0463: CPT

## 2022-07-05 PROCEDURE — 99213 OFFICE O/P EST LOW 20 MIN: CPT

## 2022-07-05 NOTE — HISTORY OF PRESENT ILLNESS
[FreeTextEntry1] : 45yo G0 (LMP 7/1/22) with 8 month h/0 AUB presents for EMB -  \par Pelvic sono showed thickened em lining 14mm with fluid and debris\par Pt refusing sonohysterogram- states she had in past and was excruciatingly painful. \par Pt not sex active (x yrs)

## 2022-07-05 NOTE — DISCUSSION/SUMMARY
[FreeTextEntry1] : 47yo G0-  AUB with thickened em on sono-  intolerant of EMB attempt\par - pre-treat with cyto PV for next attempt (pt not sex active) as well as NSAID 30min prior to appointment. \par - pt to reschedule\par - discussed importance of sampling to rule out em pathology\par - discussed role of Mirena for AUB control- pt undecided. \par \par Becki Rollins,PAC

## 2022-07-05 NOTE — PHYSICAL EXAM
[Normal] : normal external genitalia [Scant] : There was scant vaginal bleeding [FreeTextEntry5] : tucked up posteriorly to pt left side.

## 2022-07-05 NOTE — PROCEDURE
[Endometrial Biopsy] : Endometrial biopsy [Irregular Bleeding] : irregular uterine bleeding normal (ped)... [Thickened Endometrium] : thickened endometrium [Risks] : risks [Benefits] : benefits [Alternatives] : alternatives [Infection] : infection [Bleeding] : bleeding [Uterine Perforation] : uterine perforation [Negative] : negative pregnancy test [No Premedication] : No premedication [Betadine] : Betadine [None] : none [LMPDate] : 7/1/22 [de-identified] : Pt intolerant to cvx os finder probing, internal os closed.  [de-identified] : Pt intolerant of cramping-  aborted procedure to re-attempt after pre-treatment with cyto.

## 2022-07-11 ENCOUNTER — NON-APPOINTMENT (OUTPATIENT)
Age: 47
End: 2022-07-11

## 2022-07-11 DIAGNOSIS — N93.9 ABNORMAL UTERINE AND VAGINAL BLEEDING, UNSPECIFIED: ICD-10-CM

## 2022-07-13 ENCOUNTER — NON-APPOINTMENT (OUTPATIENT)
Age: 47
End: 2022-07-13

## 2022-07-20 ENCOUNTER — APPOINTMENT (OUTPATIENT)
Dept: ORTHOPEDIC SURGERY | Facility: HOSPITAL | Age: 47
End: 2022-07-20

## 2022-07-20 ENCOUNTER — OUTPATIENT (OUTPATIENT)
Dept: OUTPATIENT SERVICES | Facility: HOSPITAL | Age: 47
LOS: 1 days | End: 2022-07-20
Payer: SELF-PAY

## 2022-07-20 VITALS
TEMPERATURE: 97 F | WEIGHT: 104 LBS | HEART RATE: 61 BPM | SYSTOLIC BLOOD PRESSURE: 119 MMHG | BODY MASS INDEX: 20.96 KG/M2 | DIASTOLIC BLOOD PRESSURE: 79 MMHG | RESPIRATION RATE: 14 BRPM | HEIGHT: 59 IN

## 2022-07-20 DIAGNOSIS — M54.50 LOW BACK PAIN, UNSPECIFIED: ICD-10-CM

## 2022-07-20 DIAGNOSIS — M79.609 PAIN IN UNSPECIFIED LIMB: ICD-10-CM

## 2022-07-20 DIAGNOSIS — Z98.890 OTHER SPECIFIED POSTPROCEDURAL STATES: Chronic | ICD-10-CM

## 2022-07-20 DIAGNOSIS — M54.9 DORSALGIA, UNSPECIFIED: ICD-10-CM

## 2022-07-20 PROCEDURE — G0463: CPT

## 2022-07-25 ENCOUNTER — OUTPATIENT (OUTPATIENT)
Dept: OUTPATIENT SERVICES | Facility: HOSPITAL | Age: 47
LOS: 1 days | End: 2022-07-25
Payer: SELF-PAY

## 2022-07-25 ENCOUNTER — RESULT REVIEW (OUTPATIENT)
Age: 47
End: 2022-07-25

## 2022-07-25 ENCOUNTER — APPOINTMENT (OUTPATIENT)
Dept: OBGYN | Facility: CLINIC | Age: 47
End: 2022-07-25

## 2022-07-25 ENCOUNTER — RESULT CHARGE (OUTPATIENT)
Age: 47
End: 2022-07-25

## 2022-07-25 VITALS
BODY MASS INDEX: 20.83 KG/M2 | SYSTOLIC BLOOD PRESSURE: 124 MMHG | WEIGHT: 103.31 LBS | HEIGHT: 59 IN | DIASTOLIC BLOOD PRESSURE: 86 MMHG

## 2022-07-25 DIAGNOSIS — N76.0 ACUTE VAGINITIS: ICD-10-CM

## 2022-07-25 DIAGNOSIS — Z98.890 OTHER SPECIFIED POSTPROCEDURAL STATES: Chronic | ICD-10-CM

## 2022-07-25 LAB
HCG UR QL: NEGATIVE
QUALITY CONTROL: YES

## 2022-07-25 PROCEDURE — G0463: CPT

## 2022-07-25 PROCEDURE — 81025 URINE PREGNANCY TEST: CPT

## 2022-07-25 PROCEDURE — 99213 OFFICE O/P EST LOW 20 MIN: CPT | Mod: 25

## 2022-07-25 NOTE — DISCUSSION/SUMMARY
[FreeTextEntry1] : 45yo G0-  here for EMB attempt #2 (pre-tx with cytotec)-  for thickened EM on sono 5/2022 in setting of pt reporting irreg bleeding.  States she had intermenstrual spotting back in October followed by heavier menses with clots.  Pt states last few periods have been normal. \par - pt unable to tolerate EMB attempt in office\par - preg test neg\par - [ ]rpt pelvic sono-  discussed with pt, if lining persist thickened will recommend D&C.  IF EM lining thin- pt to keep menstrual diary and if any irreg bleeding returns will need D&C then\par \par RTC 3 wks to review Pelvic sono\par SW met with pt re: ongoing family psychosocial crisis\par Becki Rollins, PAC

## 2022-07-25 NOTE — HISTORY OF PRESENT ILLNESS
[FreeTextEntry1] : 45yo G0 (LMP 7/4/22) presents for EMB attempt #2 (for AUB- pelvic sono with thickened em 14mm w/ debris 5/2022).  Pt states irreg bleeding was mostly last year and has since resolved.  Last few periods "normal" per pt.  pt refused sonohysterogram .   Pt pre-treated today with cytotec 400 PV\par \par Pt wasa very emotional upon arrival- her brother is in the midst of mental health crisis.  SW met with pt today to offer assistance.  Offered to reschedule appt- pt desires to try and get it over with today.

## 2022-07-25 NOTE — PROCEDURE
[Endometrial Biopsy] : Endometrial biopsy [Time out performed] : Pre-procedure time out performed.  Patient's name, date of birth and procedure confirmed. [Consent Obtained] : Consent obtained [Irregular Bleeding] : irregular uterine bleeding [Risks] : risks [Benefits] : benefits [Alternatives] : alternatives [Patient] : patient [Infection] : infection [Bleeding] : bleeding [Allergic Reaction] : allergic reaction [Uterine Perforation] : uterine perforation [Negative] : negative pregnancy test [Cytotec] : Cytotec [Betadine] : Betadine [Tenaculum] : Tenaculum [LMPDate] : 7/4/22 [de-identified] : (cyto not completely resolved)  [de-identified] : Pt unable to tolerate attempted probe with cvx dilator.   Procedure stopped.

## 2022-07-29 ENCOUNTER — APPOINTMENT (OUTPATIENT)
Dept: INTERNAL MEDICINE | Facility: CLINIC | Age: 47
End: 2022-07-29

## 2022-07-29 DIAGNOSIS — N93.9 ABNORMAL UTERINE AND VAGINAL BLEEDING, UNSPECIFIED: ICD-10-CM

## 2022-08-05 ENCOUNTER — TRANSCRIPTION ENCOUNTER (OUTPATIENT)
Age: 47
End: 2022-08-05

## 2022-08-05 ENCOUNTER — APPOINTMENT (OUTPATIENT)
Dept: INTERNAL MEDICINE | Facility: CLINIC | Age: 47
End: 2022-08-05

## 2022-08-05 PROCEDURE — ZZZZZ: CPT

## 2022-08-15 NOTE — PRE PROCEDURE NOTE - PRE PROCEDURE EVALUATION
Pre-Endoscopy Evaluation  Referring Physician: GI Clinic                                    Indication for Procedure: Change in bowel habits    Sedation by Anesthesia [X ]    PAST MEDICAL & SURGICAL HISTORY:  HPV (human papilloma virus) infection    Fibromyalgia    Insomnia    H pylori ulcer    H/O oral surgery    Latex allergy: [ ] yes [X] no    Smoking: [ ] yes  [X] no    AICD/PPM: [ ] yes   [X] no    Pertinent lab data:    Physical Examination:  See nursing assessment    Constitutional: NAD    HEENT: PERRLA, EOMI,       Neck:  No JVD    Respiratory: CTAB/L    Cardiovascular: S1 and S2    Gastrointestinal: BS+, soft, NT/ND    Extremities: No peripheral edema    Neurological: A/O x 3, no focal deficits    Psychiatric: Normal mood, normal affect    : No Root    Skin: No rashes    Comments:    ASA Class: I [X]  II [ ]  III [ ]  IV [ ]    The patient is a suitable candidate for the planned procedure unless box checked [ ]  No, explain:   Patient Weight (Optional But Required For Cumulative Dose-Numbers And Decimals Only): 66

## 2022-08-19 ENCOUNTER — OUTPATIENT (OUTPATIENT)
Dept: OUTPATIENT SERVICES | Facility: HOSPITAL | Age: 47
LOS: 1 days | End: 2022-08-19
Payer: SELF-PAY

## 2022-08-19 ENCOUNTER — APPOINTMENT (OUTPATIENT)
Dept: INTERNAL MEDICINE | Facility: CLINIC | Age: 47
End: 2022-08-19

## 2022-08-19 DIAGNOSIS — Z98.890 OTHER SPECIFIED POSTPROCEDURAL STATES: Chronic | ICD-10-CM

## 2022-08-19 DIAGNOSIS — I10 ESSENTIAL (PRIMARY) HYPERTENSION: ICD-10-CM

## 2022-08-19 PROCEDURE — ZZZZZ: CPT

## 2022-08-19 PROCEDURE — 90834 PSYTX W PT 45 MINUTES: CPT

## 2022-08-23 ENCOUNTER — OUTPATIENT (OUTPATIENT)
Dept: OUTPATIENT SERVICES | Facility: HOSPITAL | Age: 47
LOS: 1 days | End: 2022-08-23
Payer: SELF-PAY

## 2022-08-23 ENCOUNTER — APPOINTMENT (OUTPATIENT)
Dept: GASTROENTEROLOGY | Facility: HOSPITAL | Age: 47
End: 2022-08-23

## 2022-08-23 VITALS
RESPIRATION RATE: 14 BRPM | TEMPERATURE: 98 F | WEIGHT: 98 LBS | SYSTOLIC BLOOD PRESSURE: 107 MMHG | DIASTOLIC BLOOD PRESSURE: 74 MMHG | HEIGHT: 59 IN | HEART RATE: 89 BPM | BODY MASS INDEX: 19.76 KG/M2

## 2022-08-23 DIAGNOSIS — Z98.890 OTHER SPECIFIED POSTPROCEDURAL STATES: Chronic | ICD-10-CM

## 2022-08-23 DIAGNOSIS — R10.9 UNSPECIFIED ABDOMINAL PAIN: ICD-10-CM

## 2022-08-23 PROCEDURE — G0463: CPT

## 2022-08-23 PROCEDURE — ZZZZZ: CPT | Mod: GC

## 2022-08-23 NOTE — HISTORY OF PRESENT ILLNESS
[de-identified] : 47 year old woman with lactose intolerance, chronic constipation, fibromyalgia and functional dyspepsia who presents for follow up of chronic GI problems.\par \par Patient last seen 5/3/22 stating her constipation was worse. She had recently had ARM that showed poor defecatory effort and unable to evacuate any of instilled rectal contrast at end of exam. Also with small 3cm rectocele in AP dimension, paradoxical contraction of puborectalis muscle during attempted defecation suggestive of pelvic floor dyssynergia. 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 She has started PT which she reports has helped her have more regular bowel movements. She continues to report she doesn't feel completely evacuated but has gotten better. She reports when her stools are very hard she will have burning in her anus and see some blood on the outside of the stool. She has not been taking fiber supplementation.\par \par \par She had stopped taking amitriptyline, which had improved her heartburn/regurgitation, due to perceived worsening constipation. She was instructed and agreed to restart last visit. She has been taking the amitriptyline which she reports was initially helping her GERD symptoms however now the symptoms have significantly worsened despite taking it. She states she has a scratching and burning from her throat down to her stomach that she describes as "grainy". She states it hurts when she swallows liquids and solids. She feels she can even feel the acid in her ear. She doesn't feel that it necessarily gets stuck but feels it is slow to move down. She reports all of these symptoms have significantly worsened/are new since her EGD in 2020. Patient has never had pH testing although had no esophagitis on EGD 2020. Patient reports weight loss however has been fluctuated between  for the past few years.

## 2022-08-23 NOTE — END OF VISIT
[] : Fellow [FreeTextEntry3] : As modified and discussed with patient\par MD KERRY Batres [facility]\par Associate Professor of Medicine\par [facility]\par

## 2022-08-23 NOTE — REVIEW OF SYSTEMS
[Recent Weight Loss (___ Lbs)] : recent [unfilled] ~Ulb weight loss [Earache] : earache [Sore Throat] : sore throat [As Noted in HPI] : as noted in HPI [Negative] : Integumentary [Fever] : no fever [Chills] : no chills [de-identified] : +headache

## 2022-08-23 NOTE — PHYSICAL EXAM
[General Appearance - Alert] : alert [General Appearance - Well-Appearing] : healthy appearing [Sclera] : the sclera and conjunctiva were normal [Exaggerated Use Of Accessory Muscles For Inspiration] : no accessory muscle use [Heart Rate And Rhythm] : heart rate was normal and rhythm regular [Edema] : there was no peripheral edema [Abdomen Soft] : soft [Abdomen Tenderness] : non-tender [Abnormal Walk] : normal gait [] : no rash [Oriented To Time, Place, And Person] : oriented to person, place, and time [FreeTextEntry1] : anxious appearing

## 2022-08-24 DIAGNOSIS — R10.13 EPIGASTRIC PAIN: ICD-10-CM

## 2022-08-24 DIAGNOSIS — K59.09 OTHER CONSTIPATION: ICD-10-CM

## 2022-08-24 DIAGNOSIS — K21.9 GASTRO-ESOPHAGEAL REFLUX DISEASE WITHOUT ESOPHAGITIS: ICD-10-CM

## 2022-08-24 DIAGNOSIS — M62.89 OTHER SPECIFIED DISORDERS OF MUSCLE: ICD-10-CM

## 2022-08-25 ENCOUNTER — APPOINTMENT (OUTPATIENT)
Dept: DERMATOLOGY | Facility: HOSPITAL | Age: 47
End: 2022-08-25
Payer: COMMERCIAL

## 2022-08-25 ENCOUNTER — OUTPATIENT (OUTPATIENT)
Dept: OUTPATIENT SERVICES | Facility: HOSPITAL | Age: 47
LOS: 1 days | End: 2022-08-25
Payer: SELF-PAY

## 2022-08-25 VITALS
DIASTOLIC BLOOD PRESSURE: 70 MMHG | BODY MASS INDEX: 19.76 KG/M2 | SYSTOLIC BLOOD PRESSURE: 109 MMHG | HEIGHT: 59 IN | WEIGHT: 98 LBS | HEART RATE: 60 BPM | RESPIRATION RATE: 14 BRPM | TEMPERATURE: 98 F

## 2022-08-25 DIAGNOSIS — Z98.890 OTHER SPECIFIED POSTPROCEDURAL STATES: Chronic | ICD-10-CM

## 2022-08-25 DIAGNOSIS — L30.9 DERMATITIS, UNSPECIFIED: ICD-10-CM

## 2022-08-25 DIAGNOSIS — K13.0 DISEASES OF LIPS: ICD-10-CM

## 2022-08-25 DIAGNOSIS — L91.8 OTHER HYPERTROPHIC DISORDERS OF THE SKIN: ICD-10-CM

## 2022-08-25 DIAGNOSIS — L98.9 DISORDER OF THE SKIN AND SUBCUTANEOUS TISSUE, UNSPECIFIED: ICD-10-CM

## 2022-08-25 PROCEDURE — ZZZZZ: CPT | Mod: GC

## 2022-08-25 PROCEDURE — G0463: CPT

## 2022-08-25 RX ORDER — SODIUM PHOSPHATE, DIBASIC AND SODIUM PHOSPHATE, MONOBASIC 7; 19 G/230ML; G/230ML
ENEMA RECTAL
Qty: 1 | Refills: 5 | Status: DISCONTINUED | COMMUNITY
Start: 2022-03-01 | End: 2022-08-25

## 2022-08-25 RX ORDER — OLOPATADINE HCL 1 MG/ML
0.1 SOLUTION/ DROPS OPHTHALMIC TWICE DAILY
Qty: 1 | Refills: 2 | Status: DISCONTINUED | COMMUNITY
Start: 2021-08-18 | End: 2022-08-25

## 2022-08-25 RX ORDER — HYDROQUINONE 40 MG/G
4 CREAM TOPICAL
Qty: 1 | Refills: 2 | Status: ACTIVE | COMMUNITY
Start: 2022-08-25 | End: 1900-01-01

## 2022-08-25 RX ORDER — ACETAMINOPHEN 325 MG
TABLET ORAL
Refills: 0 | Status: DISCONTINUED | COMMUNITY
End: 2022-08-25

## 2022-08-25 RX ORDER — WHEAT DEXTRIN 3 G/4 G
POWDER (GRAM) ORAL
Qty: 1 | Refills: 0 | Status: DISCONTINUED | COMMUNITY
Start: 2022-05-03 | End: 2022-08-25

## 2022-08-26 ENCOUNTER — TRANSCRIPTION ENCOUNTER (OUTPATIENT)
Age: 47
End: 2022-08-26

## 2022-08-30 DIAGNOSIS — F43.10 POST-TRAUMATIC STRESS DISORDER, UNSPECIFIED: ICD-10-CM

## 2022-08-31 DIAGNOSIS — K13.0 DISEASES OF LIPS: ICD-10-CM

## 2022-08-31 DIAGNOSIS — L30.9 DERMATITIS, UNSPECIFIED: ICD-10-CM

## 2022-08-31 DIAGNOSIS — L91.8 OTHER HYPERTROPHIC DISORDERS OF THE SKIN: ICD-10-CM

## 2022-08-31 DIAGNOSIS — L81.1 CHLOASMA: ICD-10-CM

## 2022-09-07 ENCOUNTER — OUTPATIENT (OUTPATIENT)
Dept: OUTPATIENT SERVICES | Facility: HOSPITAL | Age: 47
LOS: 1 days | End: 2022-09-07
Payer: SELF-PAY

## 2022-09-07 DIAGNOSIS — Z98.890 OTHER SPECIFIED POSTPROCEDURAL STATES: Chronic | ICD-10-CM

## 2022-09-07 DIAGNOSIS — Z11.52 ENCOUNTER FOR SCREENING FOR COVID-19: ICD-10-CM

## 2022-09-07 LAB — SARS-COV-2 RNA SPEC QL NAA+PROBE: SIGNIFICANT CHANGE UP

## 2022-09-07 PROCEDURE — C9803: CPT

## 2022-09-07 PROCEDURE — U0003: CPT

## 2022-09-07 PROCEDURE — U0005: CPT

## 2022-09-09 ENCOUNTER — OUTPATIENT (OUTPATIENT)
Dept: OUTPATIENT SERVICES | Facility: HOSPITAL | Age: 47
LOS: 1 days | End: 2022-09-09
Payer: SELF-PAY

## 2022-09-09 ENCOUNTER — RESULT REVIEW (OUTPATIENT)
Age: 47
End: 2022-09-09

## 2022-09-09 ENCOUNTER — TRANSCRIPTION ENCOUNTER (OUTPATIENT)
Age: 47
End: 2022-09-09

## 2022-09-09 VITALS
OXYGEN SATURATION: 100 % | RESPIRATION RATE: 18 BRPM | SYSTOLIC BLOOD PRESSURE: 133 MMHG | HEART RATE: 57 BPM | DIASTOLIC BLOOD PRESSURE: 75 MMHG

## 2022-09-09 VITALS
HEIGHT: 59 IN | WEIGHT: 98.11 LBS | RESPIRATION RATE: 18 BRPM | TEMPERATURE: 98 F | OXYGEN SATURATION: 100 % | DIASTOLIC BLOOD PRESSURE: 72 MMHG | SYSTOLIC BLOOD PRESSURE: 104 MMHG | HEART RATE: 62 BPM

## 2022-09-09 DIAGNOSIS — K21.9 GASTRO-ESOPHAGEAL REFLUX DISEASE WITHOUT ESOPHAGITIS: ICD-10-CM

## 2022-09-09 DIAGNOSIS — Z98.890 OTHER SPECIFIED POSTPROCEDURAL STATES: Chronic | ICD-10-CM

## 2022-09-09 DIAGNOSIS — R12 HEARTBURN: ICD-10-CM

## 2022-09-09 PROCEDURE — 88305 TISSUE EXAM BY PATHOLOGIST: CPT | Mod: 26

## 2022-09-09 PROCEDURE — 43239 EGD BIOPSY SINGLE/MULTIPLE: CPT

## 2022-09-09 PROCEDURE — 88305 TISSUE EXAM BY PATHOLOGIST: CPT

## 2022-09-09 PROCEDURE — C1889: CPT

## 2022-09-09 DEVICE — IMPLANTABLE DEVICE: Type: IMPLANTABLE DEVICE | Status: FUNCTIONAL

## 2022-09-09 RX ORDER — SODIUM CHLORIDE 9 MG/ML
500 INJECTION INTRAMUSCULAR; INTRAVENOUS; SUBCUTANEOUS
Refills: 0 | Status: COMPLETED | OUTPATIENT
Start: 2022-09-09 | End: 2022-09-09

## 2022-09-09 RX ADMIN — SODIUM CHLORIDE 30 MILLILITER(S): 9 INJECTION INTRAMUSCULAR; INTRAVENOUS; SUBCUTANEOUS at 11:11

## 2022-09-09 NOTE — ASU PATIENT PROFILE, ADULT - FALL HARM RISK - UNIVERSAL INTERVENTIONS
Bed in lowest position, wheels locked, appropriate side rails in place/Call bell, personal items and telephone in reach/Instruct patient to call for assistance before getting out of bed or chair/Non-slip footwear when patient is out of bed/[place] to call system/Physically safe environment - no spills, clutter or unnecessary equipment/Purposeful Proactive Rounding/Room/bathroom lighting operational, light cord in reach

## 2022-09-09 NOTE — PRE PROCEDURE NOTE - PRE PROCEDURE EVALUATION
Attending Physician:              Abdoul Lang MD MSEd    Indication for Procedure          chest pain, gerd      PAST MEDICAL & SURGICAL HISTORY:  H pylori ulcer  treated      Insomnia      Fibromyalgia      HPV (human papilloma virus) infection      Complex ovarian cyst      GERD (gastroesophageal reflux disease)      H/O oral surgery      S/P endoscopy  9/2020      History of salpingostomy            See Allscripts Note for further details  ALLERGIES:  latex (Rash (Mild to Mod); Urticaria (Mild to Mod))  No Known Drug Allergies    HOME MEDICATIONS:  famotidine 20 mg oral tablet: 1 tab(s) orally 2 times a day  Fish Oil: 700 milligram(s) orally  magnesium citrate: 300 milliliter(s) orally  magnesium citrate: 300 milligram(s) orally  Melatonin 2.5 mg oral capsule: 1 cap(s) orally once a day (at bedtime)      See Allscripts Note for further details    AICD/PPM: [ ] yes   [x ] no    PERTINENT LAB DATA:                      PHYSICAL EXAMINATION:    T(C): --  HR: --  BP: --  RR: --  SpO2: --    Constitutional: NAD  HEENT: PERRLA, EOMI,    Neck:  No JVD  Respiratory: CTAB/L  Cardiovascular: S1 and S2  Gastrointestinal: BS+, soft, NT/ND  Extremities: No peripheral edema  Neurological: A/O x 3, no focal deficits  Psychiatric: Normal mood, normal affect  Skin: No rashes    ASA Class: I [ ]  II [ ]  III [x ]  IV [ ]    COMMENTS:    The patient is a suitable candidate for the planned procedure unless box checked [ ]  No, explain:

## 2022-09-09 NOTE — ASU DISCHARGE PLAN (ADULT/PEDIATRIC) - NS MD DC FALL RISK RISK
For information on Fall & Injury Prevention, visit: [email or web address] OR  [email or web address] OR  https://www.cdc.gov/steadi/patient.html

## 2022-09-09 NOTE — ED PROVIDER NOTE - INCLUDE COVID-19 DISCHARGE INSTRUCTIONS
verbal cues/nonverbal cues (demo/gestures)
<-------- Click here to INCLUDE CoVID-19 Discharge Instructions

## 2022-09-12 ENCOUNTER — APPOINTMENT (OUTPATIENT)
Dept: INTERNAL MEDICINE | Facility: CLINIC | Age: 47
End: 2022-09-12

## 2022-09-12 LAB — SURGICAL PATHOLOGY STUDY: SIGNIFICANT CHANGE UP

## 2022-09-13 ENCOUNTER — APPOINTMENT (OUTPATIENT)
Dept: GASTROENTEROLOGY | Facility: HOSPITAL | Age: 47
End: 2022-09-13

## 2022-09-14 PROCEDURE — 91035 G-ESOPH REFLX TST W/ELECTROD: CPT | Mod: 26

## 2022-09-21 ENCOUNTER — APPOINTMENT (OUTPATIENT)
Dept: INTERNAL MEDICINE | Facility: CLINIC | Age: 47
End: 2022-09-21

## 2022-09-21 ENCOUNTER — OUTPATIENT (OUTPATIENT)
Dept: OUTPATIENT SERVICES | Facility: HOSPITAL | Age: 47
LOS: 1 days | End: 2022-09-21
Payer: SELF-PAY

## 2022-09-21 DIAGNOSIS — Z98.890 OTHER SPECIFIED POSTPROCEDURAL STATES: Chronic | ICD-10-CM

## 2022-09-21 DIAGNOSIS — I10 ESSENTIAL (PRIMARY) HYPERTENSION: ICD-10-CM

## 2022-09-21 PROCEDURE — 90836 PSYTX W PT W E/M 45 MIN: CPT

## 2022-09-21 PROCEDURE — ZZZZZ: CPT

## 2022-09-22 ENCOUNTER — APPOINTMENT (OUTPATIENT)
Dept: ULTRASOUND IMAGING | Facility: IMAGING CENTER | Age: 47
End: 2022-09-22

## 2022-09-23 ENCOUNTER — OUTPATIENT (OUTPATIENT)
Dept: OUTPATIENT SERVICES | Facility: HOSPITAL | Age: 47
LOS: 1 days | End: 2022-09-23

## 2022-09-23 ENCOUNTER — APPOINTMENT (OUTPATIENT)
Dept: ULTRASOUND IMAGING | Facility: IMAGING CENTER | Age: 47
End: 2022-09-23

## 2022-09-23 ENCOUNTER — OUTPATIENT (OUTPATIENT)
Dept: OUTPATIENT SERVICES | Facility: HOSPITAL | Age: 47
LOS: 1 days | End: 2022-09-23
Payer: SELF-PAY

## 2022-09-23 DIAGNOSIS — N93.9 ABNORMAL UTERINE AND VAGINAL BLEEDING, UNSPECIFIED: ICD-10-CM

## 2022-09-23 DIAGNOSIS — Z98.890 OTHER SPECIFIED POSTPROCEDURAL STATES: Chronic | ICD-10-CM

## 2022-09-23 DIAGNOSIS — R10.2 PELVIC AND PERINEAL PAIN: ICD-10-CM

## 2022-09-23 DIAGNOSIS — I10 ESSENTIAL (PRIMARY) HYPERTENSION: ICD-10-CM

## 2022-09-23 PROCEDURE — 76856 US EXAM PELVIC COMPLETE: CPT | Mod: 26

## 2022-09-23 PROCEDURE — 76856 US EXAM PELVIC COMPLETE: CPT

## 2022-09-23 PROCEDURE — 76830 TRANSVAGINAL US NON-OB: CPT

## 2022-09-23 PROCEDURE — 76830 TRANSVAGINAL US NON-OB: CPT | Mod: 26

## 2022-09-28 ENCOUNTER — APPOINTMENT (OUTPATIENT)
Dept: INTERNAL MEDICINE | Facility: CLINIC | Age: 47
End: 2022-09-28

## 2022-09-28 ENCOUNTER — OUTPATIENT (OUTPATIENT)
Dept: OUTPATIENT SERVICES | Facility: HOSPITAL | Age: 47
LOS: 1 days | End: 2022-09-28
Payer: SELF-PAY

## 2022-09-28 VITALS
HEART RATE: 70 BPM | HEIGHT: 59 IN | OXYGEN SATURATION: 98 % | WEIGHT: 98 LBS | SYSTOLIC BLOOD PRESSURE: 100 MMHG | DIASTOLIC BLOOD PRESSURE: 70 MMHG | BODY MASS INDEX: 19.76 KG/M2

## 2022-09-28 DIAGNOSIS — Z98.890 OTHER SPECIFIED POSTPROCEDURAL STATES: Chronic | ICD-10-CM

## 2022-09-28 DIAGNOSIS — M79.7 FIBROMYALGIA: ICD-10-CM

## 2022-09-28 DIAGNOSIS — I10 ESSENTIAL (PRIMARY) HYPERTENSION: ICD-10-CM

## 2022-09-28 PROCEDURE — G0463: CPT

## 2022-09-28 PROCEDURE — ZZZZZ: CPT

## 2022-09-28 RX ORDER — DICLOFENAC SODIUM 1% 10 MG/G
1 GEL TOPICAL DAILY
Qty: 1 | Refills: 1 | Status: ACTIVE | COMMUNITY
Start: 2022-09-28 | End: 1900-01-01

## 2022-09-28 NOTE — REVIEW OF SYSTEMS
[Constipation] : constipation [Joint Pain] : joint pain [Joint Stiffness] : joint stiffness [Negative] : Neurological

## 2022-10-02 NOTE — PHYSICAL EXAM
[Normal] : affect was normal and insight and judgment were intact [de-identified] : + pinpoint multiple joint tenderness

## 2022-10-02 NOTE — END OF VISIT
[] : Resident [FreeTextEntry3] : trial of topical NSAIDS. No time to do additional PT so will do home exercises to help with fibromyalgia

## 2022-10-02 NOTE — ASSESSMENT
[FreeTextEntry1] : 47Y F w/ PMH of chronic constipation, functional GI disorder, and fibromyalgia presents to the clinic for worsening polyarthritic joint pain over the past few months. \par \par 1. Polyarthritic joint pain \par Known hx of fibromyalgia \par Patient not amenable to start PO meds due to fear of worsening esophagitis and constipation \par - Started 1% Diclofenac gel to affected joints as needed \par \par RTC in 10 weeks \par Paulette Quintana \par PGY1

## 2022-10-02 NOTE — HISTORY OF PRESENT ILLNESS
[FreeTextEntry8] : 47Y F w/ PMH of chronic constipation, functional GI disorder, and fibromyalgia presents to the clinic for worsening R. arm pain. Pt states the pain started in the elbow and has progressed from the neck to the hand over the past few months. The pain is 8-9 out of 10 and worsens with activity. The pain is non-radiating and associated with pinpoint tenderness. Pt also endorses diffuse joint pain (b/l knees, L. elbow, upper and lower back) but denies swelling, stiffness, or rashes. Rest and fish oil seem to help with the pain. Pt sees physical therapy for pelvic floor exercises. She denies fever, numbness/tingling, headache, nausea, vomiting, changes in vision or dysuria.

## 2022-10-03 DIAGNOSIS — M79.7 FIBROMYALGIA: ICD-10-CM

## 2022-10-05 DIAGNOSIS — F43.10 POST-TRAUMATIC STRESS DISORDER, UNSPECIFIED: ICD-10-CM

## 2022-10-07 ENCOUNTER — APPOINTMENT (OUTPATIENT)
Dept: INTERNAL MEDICINE | Facility: CLINIC | Age: 47
End: 2022-10-07

## 2022-10-13 ENCOUNTER — APPOINTMENT (OUTPATIENT)
Dept: OBGYN | Facility: CLINIC | Age: 47
End: 2022-10-13

## 2022-10-13 ENCOUNTER — OUTPATIENT (OUTPATIENT)
Dept: OUTPATIENT SERVICES | Facility: HOSPITAL | Age: 47
LOS: 1 days | End: 2022-10-13
Payer: SELF-PAY

## 2022-10-13 VITALS — WEIGHT: 102 LBS | BODY MASS INDEX: 20.6 KG/M2 | DIASTOLIC BLOOD PRESSURE: 77 MMHG | SYSTOLIC BLOOD PRESSURE: 120 MMHG

## 2022-10-13 DIAGNOSIS — Z98.890 OTHER SPECIFIED POSTPROCEDURAL STATES: Chronic | ICD-10-CM

## 2022-10-13 DIAGNOSIS — N89.8 OTHER SPECIFIED NONINFLAMMATORY DISORDERS OF VAGINA: ICD-10-CM

## 2022-10-13 DIAGNOSIS — N76.0 ACUTE VAGINITIS: ICD-10-CM

## 2022-10-13 DIAGNOSIS — B96.89 ACUTE VAGINITIS: ICD-10-CM

## 2022-10-13 DIAGNOSIS — I10 ESSENTIAL (PRIMARY) HYPERTENSION: ICD-10-CM

## 2022-10-13 PROCEDURE — G0463: CPT

## 2022-10-13 PROCEDURE — 99213 OFFICE O/P EST LOW 20 MIN: CPT

## 2022-10-14 NOTE — PHYSICAL EXAM
[Chaperone Present] : A chaperone was present in the examining room during all aspects of the physical examination [Appropriately responsive] : appropriately responsive [Alert] : alert [No Acute Distress] : no acute distress [Soft] : soft [Non-tender] : non-tender [Non-distended] : non-distended [No HSM] : No HSM [No Lesions] : no lesions [No Mass] : no mass [Oriented x3] : oriented x3 [Labia Majora] : normal [Labia Minora] : normal [Discharge] : a  ~M vaginal discharge was present [Scant] : scant [Clear] : clear [Thin] : thin [No Bleeding] : There was no active vaginal bleeding [Normal] : normal [Uterine Adnexae] : normal [Foul Smelling] : not foul smelling

## 2022-10-14 NOTE — PLAN
[FreeTextEntry1] : #Vaginal discharge \par - PE c/w Bacterial vaginosis\par - RX for Metrogel x5 days sent to [facility]\par - Patient instructed to return to care if symptoms persist after treatment \par \par #Thickened Endometrium s/p D+C 2020 Hysteroscopic polypectomy \par - US 2022 likely 2/2 endometrial polyp  \par - unsuccessful EMBx x2\par - r/b/a of pursuing hysteroscopic evaluation discussed\par - patient counseled that since she is still menstruating, thickened endometrial lining on US may be dependent on timing of her cycle \par - Hysteroscopy offered and declined \par - Patient desires sono hysterogram at this time for evaluation of cavity, advised to take pain medication prior \par - All questions answered to patient satisfaction \par - RTC 2-3 months \par \par #Fibromyalgia\par - managed by PCP\par \par seen and d/w Dr. Orta \par Ayleen Peterson PGY3

## 2022-10-14 NOTE — END OF VISIT
[FreeTextEntry3] : I agree with the above assessment and plan. Rossy Orta MD \par Patient with ?polyp on saline sono, discussed that without sx, don’t need to remove polyp. Pt states she wants to keep an eye on her lining. Reviewed sono timing with endometrial lining thickness. Plan for saline sonogram after period and will continue to address. all questions answered\par Rossy Orta MD

## 2022-10-14 NOTE — HISTORY OF PRESENT ILLNESS
[FreeTextEntry1] : 45yo G0 (LMP 10/4/22) presents for evaluation of thickened EM on sono 5/2022 s/p 2 unsuccessful EMB attempts in the office. Patient endorsing 1 month h/o thin watery discharge and vaginal itchng similar to previous episodes of bacterial vaginosis. Patient reports that she has dark brown clots on the 1st day of menses only requiring panty liners. She reports discomfort in her joints related to fibromyalgia that is managed by her PCP. Denies fevers, headaches, CP, SOB, abdominal pain, N/V, dysuria, vaginal bleeding, and edema. \par \par US (9/23/22): Persistent thickened endometrium (18mm) with associated endometrial polyp (0.8cm). Complex right ovarian cyst (2.6 x 2.1cm) with normal flow in the right ovarian parenchyma. Small to mild free fluid in the cul-de-sac. \par \par OB: G0\par GYN: LMP 10/4, regular monthly menses. Denies STIs. Pap Jan 2022 HPV neg and NIELM \par PMH: Fibromyalgia, IBS, functional GI disorder  \par PSH: 2020 LSO and hysteroscopic polypectomy \par Meds: Fish oil, topical pain reliever\par All: NKDA \par Shx: Denies toxic habits, exposed to 2nd hand smoke when lived with her brother

## 2022-10-14 NOTE — REVIEW OF SYSTEMS
[Urethral Discharge] : urethral discharge [Negative] : Heme/Lymph [Fever] : no fever [Chills] : no chills [Dyspnea] : no dyspnea [Cough] : no cough [Chest Pain] : no chest pain [Palpitations] : no palpitations [Abdominal Pain] : no abdominal pain [Urgency] : no urgency [Frequency] : no frequency [Dysuria] : no dysuria [Abn Vaginal bleeding] : no abnormal vaginal bleeding

## 2022-10-17 ENCOUNTER — APPOINTMENT (OUTPATIENT)
Dept: INTERNAL MEDICINE | Facility: CLINIC | Age: 47
End: 2022-10-17

## 2022-10-17 DIAGNOSIS — N84.0 POLYP OF CORPUS UTERI: ICD-10-CM

## 2022-10-17 DIAGNOSIS — N89.8 OTHER SPECIFIED NONINFLAMMATORY DISORDERS OF VAGINA: ICD-10-CM

## 2022-10-18 ENCOUNTER — APPOINTMENT (OUTPATIENT)
Dept: INTERNAL MEDICINE | Facility: CLINIC | Age: 47
End: 2022-10-18

## 2022-11-02 ENCOUNTER — OUTPATIENT (OUTPATIENT)
Dept: OUTPATIENT SERVICES | Facility: HOSPITAL | Age: 47
LOS: 1 days | End: 2022-11-02
Payer: SELF-PAY

## 2022-11-02 ENCOUNTER — APPOINTMENT (OUTPATIENT)
Dept: INTERNAL MEDICINE | Facility: CLINIC | Age: 47
End: 2022-11-02

## 2022-11-02 ENCOUNTER — RESULT REVIEW (OUTPATIENT)
Age: 47
End: 2022-11-02

## 2022-11-02 VITALS
WEIGHT: 102 LBS | SYSTOLIC BLOOD PRESSURE: 100 MMHG | HEIGHT: 59 IN | DIASTOLIC BLOOD PRESSURE: 70 MMHG | HEART RATE: 64 BPM | BODY MASS INDEX: 20.56 KG/M2 | OXYGEN SATURATION: 98 %

## 2022-11-02 DIAGNOSIS — I10 ESSENTIAL (PRIMARY) HYPERTENSION: ICD-10-CM

## 2022-11-02 DIAGNOSIS — Z98.890 OTHER SPECIFIED POSTPROCEDURAL STATES: Chronic | ICD-10-CM

## 2022-11-02 DIAGNOSIS — M17.0 BILATERAL PRIMARY OSTEOARTHRITIS OF KNEE: ICD-10-CM

## 2022-11-02 PROCEDURE — 85025 COMPLETE CBC W/AUTO DIFF WBC: CPT

## 2022-11-02 PROCEDURE — 80053 COMPREHEN METABOLIC PANEL: CPT

## 2022-11-02 PROCEDURE — 82306 VITAMIN D 25 HYDROXY: CPT

## 2022-11-02 PROCEDURE — G0463: CPT

## 2022-11-02 PROCEDURE — 80061 LIPID PANEL: CPT

## 2022-11-02 PROCEDURE — 84443 ASSAY THYROID STIM HORMONE: CPT

## 2022-11-02 PROCEDURE — ZZZZZ: CPT

## 2022-11-02 PROCEDURE — 83036 HEMOGLOBIN GLYCOSYLATED A1C: CPT

## 2022-11-02 PROCEDURE — 82607 VITAMIN B-12: CPT

## 2022-11-02 RX ORDER — MISOPROSTOL 200 UG/1
200 TABLET ORAL
Qty: 2 | Refills: 0 | Status: DISCONTINUED | COMMUNITY
Start: 2022-07-05 | End: 2022-11-02

## 2022-11-02 RX ORDER — AMITRIPTYLINE HYDROCHLORIDE 25 MG/1
25 TABLET, FILM COATED ORAL
Qty: 30 | Refills: 3 | Status: DISCONTINUED | COMMUNITY
Start: 2022-05-03 | End: 2022-11-02

## 2022-11-02 RX ORDER — HYDROCORTISONE 25 MG/G
2.5 OINTMENT TOPICAL
Qty: 1 | Refills: 3 | Status: DISCONTINUED | COMMUNITY
Start: 2021-04-22 | End: 2022-11-02

## 2022-11-02 RX ORDER — SUCRALFATE 1 G/1
1 TABLET ORAL
Qty: 60 | Refills: 0 | Status: DISCONTINUED | COMMUNITY
Start: 2022-05-13 | End: 2022-11-02

## 2022-11-02 RX ORDER — CLOTRIMAZOLE AND BETAMETHASONE DIPROPIONATE 10; .5 MG/G; MG/G
1-0.05 CREAM TOPICAL TWICE DAILY
Qty: 1 | Refills: 2 | Status: DISCONTINUED | COMMUNITY
Start: 2022-08-25 | End: 2022-11-02

## 2022-11-02 RX ORDER — METRONIDAZOLE 7.5 MG/G
0.75 GEL VAGINAL
Qty: 1 | Refills: 0 | Status: DISCONTINUED | COMMUNITY
Start: 2022-10-13 | End: 2022-11-02

## 2022-11-04 ENCOUNTER — NON-APPOINTMENT (OUTPATIENT)
Age: 47
End: 2022-11-04

## 2022-11-04 LAB
25(OH)D3 SERPL-MCNC: 16.4 NG/ML
ALBUMIN SERPL ELPH-MCNC: 4.3 G/DL
ALP BLD-CCNC: 43 U/L
ALT SERPL-CCNC: 24 U/L
ANION GAP SERPL CALC-SCNC: 10 MMOL/L
AST SERPL-CCNC: 21 U/L
BASOPHILS # BLD AUTO: 0.05 K/UL
BASOPHILS NFR BLD AUTO: 0.8 %
BILIRUB SERPL-MCNC: 0.2 MG/DL
BUN SERPL-MCNC: 14 MG/DL
CALCIUM SERPL-MCNC: 9 MG/DL
CHLORIDE SERPL-SCNC: 105 MMOL/L
CHOLEST SERPL-MCNC: 161 MG/DL
CO2 SERPL-SCNC: 26 MMOL/L
CREAT SERPL-MCNC: 0.77 MG/DL
EGFR: 97 ML/MIN/1.73M2
EOSINOPHIL # BLD AUTO: 0.33 K/UL
EOSINOPHIL NFR BLD AUTO: 5.5 %
ESTIMATED AVERAGE GLUCOSE: 100 MG/DL
FOLATE SERPL-MCNC: 13.9 NG/ML
GLUCOSE SERPL-MCNC: 83 MG/DL
HBA1C MFR BLD HPLC: 5.1 %
HCT VFR BLD CALC: 37.1 %
HDLC SERPL-MCNC: 64 MG/DL
HGB BLD-MCNC: 11.8 G/DL
IMM GRANULOCYTES NFR BLD AUTO: 0.3 %
LDLC SERPL CALC-MCNC: 79 MG/DL
LYMPHOCYTES # BLD AUTO: 2.45 K/UL
LYMPHOCYTES NFR BLD AUTO: 40.6 %
MAN DIFF?: NORMAL
MCHC RBC-ENTMCNC: 30 PG
MCHC RBC-ENTMCNC: 31.8 GM/DL
MCV RBC AUTO: 94.4 FL
MONOCYTES # BLD AUTO: 0.38 K/UL
MONOCYTES NFR BLD AUTO: 6.3 %
NEUTROPHILS # BLD AUTO: 2.8 K/UL
NEUTROPHILS NFR BLD AUTO: 46.5 %
NONHDLC SERPL-MCNC: 97 MG/DL
PLATELET # BLD AUTO: 299 K/UL
POTASSIUM SERPL-SCNC: 4.1 MMOL/L
PROT SERPL-MCNC: 7 G/DL
RBC # BLD: 3.93 M/UL
RBC # FLD: 12.8 %
SODIUM SERPL-SCNC: 140 MMOL/L
TRIGL SERPL-MCNC: 89 MG/DL
TSH SERPL-ACNC: 2.4 UIU/ML
VIT B12 SERPL-MCNC: 315 PG/ML
WBC # FLD AUTO: 6.03 K/UL

## 2022-11-12 ENCOUNTER — OUTPATIENT (OUTPATIENT)
Dept: OUTPATIENT SERVICES | Facility: HOSPITAL | Age: 47
LOS: 1 days | End: 2022-11-12
Payer: COMMERCIAL

## 2022-11-12 ENCOUNTER — APPOINTMENT (OUTPATIENT)
Dept: RADIOLOGY | Facility: CLINIC | Age: 47
End: 2022-11-12

## 2022-11-12 DIAGNOSIS — Z98.890 OTHER SPECIFIED POSTPROCEDURAL STATES: Chronic | ICD-10-CM

## 2022-11-12 DIAGNOSIS — M17.0 BILATERAL PRIMARY OSTEOARTHRITIS OF KNEE: ICD-10-CM

## 2022-11-12 PROCEDURE — 73564 X-RAY EXAM KNEE 4 OR MORE: CPT | Mod: 26,50

## 2022-11-12 PROCEDURE — 73564 X-RAY EXAM KNEE 4 OR MORE: CPT

## 2022-11-14 NOTE — ASSESSMENT
[FreeTextEntry1] : 47F with chronic constipation, functional GI disorder, and fibromyalgia presents to the clinic for CPE.\par \par #Knee pain\par Ongoing for several months. L>R.\par - XR knees\par - PT ordered\par \par #Fibromyalgia\par - Pt deferring new medications at this time. Can bring up duloxetine again at next visit.\par \par #Acid reflux\par Follows with GI. S/p Endoscopy 9/2022 and PH monitor. F/u with GI\par \par #Constipation\par Improved with pelvic floor therapy, now has BM almost every day, however still has sensation of incomplete voiding and L abdominal pain. F/u GI.\par \par #HCM\par Cervical CA 1/2022\par Breast CA 6/2022 BIRADS 0 -> L US w/ bx benign, mammogram+US in 1 year\par Colon CA 5/2015\par HCV neg; HIV neg\par Immunizations: COVID-19 (due for booster), Flu (deferred today). TDAP (UTD)

## 2022-11-14 NOTE — HISTORY OF PRESENT ILLNESS
[FreeTextEntry1] : CPE [de-identified] : 47F with chronic constipation, functional GI disorder, and fibromyalgia presents to the clinic for CPE.\par \par #Knee pain\par Ongoing for several months. L>R. Has been taking collagenics metagenetics supplements. Previously taking OmegaGenics EPA-. Interested in physical therapy, however she is still in pelvic floor therapy for chronic constipation.\par \par #Fibromyalgia\par Previously trialed amitriptyline. Pt reports it helped with her acid reflux but not her muscle pains. Does not want to start any other medications at this time. Wishes to trial taking both collagenics and omegagenics supplements first.\par \par #Acid reflux\par Follows with GI. S/p Endoscopy 9/2022 and PH monitor. Continues to have symptoms.\par \par #Constipation\par Improved with pelvic floor therapy, now has BM almost every day, however still has sensation of incomplete voiding and L abdominal pain.\par \par \par #HCM\par Cervical CA 1/2022\par Breast CA 6/2022 BIRADS 0 -> L US w/ bx benign, US in 1 year\par Colon CA 5/2015\par HCV neg; HIV neg\par Immunizations: COVID-19 (due for booster), Flu (deferred today). TDAP (UTD)

## 2022-11-14 NOTE — REVIEW OF SYSTEMS
[Abdominal Pain] : abdominal pain [Constipation] : constipation [Heartburn] : heartburn [Joint Pain] : joint pain [Joint Stiffness] : joint stiffness [Muscle Pain] : muscle pain [Negative] : Neurological [Nausea] : no nausea [Diarrhea] : diarrhea [Vomiting] : no vomiting [Melena] : no melena [Joint Swelling] : no joint swelling

## 2022-11-15 DIAGNOSIS — Z00.00 ENCOUNTER FOR GENERAL ADULT MEDICAL EXAMINATION WITHOUT ABNORMAL FINDINGS: ICD-10-CM

## 2022-11-22 ENCOUNTER — OUTPATIENT (OUTPATIENT)
Dept: OUTPATIENT SERVICES | Facility: HOSPITAL | Age: 47
LOS: 1 days | End: 2022-11-22
Payer: SELF-PAY

## 2022-11-22 ENCOUNTER — APPOINTMENT (OUTPATIENT)
Dept: GASTROENTEROLOGY | Facility: HOSPITAL | Age: 47
End: 2022-11-22

## 2022-11-22 VITALS
BODY MASS INDEX: 21.11 KG/M2 | TEMPERATURE: 97 F | RESPIRATION RATE: 14 BRPM | HEIGHT: 59 IN | HEART RATE: 62 BPM | DIASTOLIC BLOOD PRESSURE: 62 MMHG | WEIGHT: 104.72 LBS | SYSTOLIC BLOOD PRESSURE: 94 MMHG

## 2022-11-22 DIAGNOSIS — R12 HEARTBURN: ICD-10-CM

## 2022-11-22 DIAGNOSIS — K76.9 LIVER DISEASE, UNSPECIFIED: ICD-10-CM

## 2022-11-22 PROCEDURE — ZZZZZ: CPT | Mod: GC

## 2022-11-22 PROCEDURE — G0463: CPT

## 2022-11-23 PROBLEM — R12 FUNCTIONAL HEARTBURN: Status: ACTIVE | Noted: 2022-05-03

## 2022-11-23 NOTE — PHYSICAL EXAM
[Alert] : alert [Healthy Appearing] : healthy appearing [No Acute Distress] : no acute distress [Sclera] : the sclera and conjunctiva were normal [No Respiratory Distress] : no respiratory distress [No Acc Muscle Use] : no accessory muscle use [Heart Rate And Rhythm] : heart rate was normal and rhythm regular [Abdomen Tenderness] : non-tender [Abdomen Soft] : soft [Abnormal Walk] : normal gait [Oriented To Time, Place, And Person] : oriented to person, place, and time

## 2022-11-28 DIAGNOSIS — R12 HEARTBURN: ICD-10-CM

## 2022-11-28 DIAGNOSIS — K59.00 CONSTIPATION, UNSPECIFIED: ICD-10-CM

## 2022-11-28 DIAGNOSIS — M62.89 OTHER SPECIFIED DISORDERS OF MUSCLE: ICD-10-CM

## 2022-11-28 NOTE — REVIEW OF SYSTEMS
[As Noted in HPI] : as noted in HPI [Negative] : Neurological [Fever] : no fever [Chills] : no chills [Recent Weight Loss (___ Lbs)] : no recent weight loss [FreeTextEntry9] : left knee pain

## 2022-11-28 NOTE — HISTORY OF PRESENT ILLNESS
[FreeTextEntry1] : 47 year old woman with lactose intolerance, chronic constipation, fibromyalgia and functional dyspepsia who presents for follow up of chronic GI problems.\par \par Patient last seen 8/2022. At that time, reported PT had helped with constipation however still felt incomplete evacuation. Last visit, she had severe scratching and burning in her throat down to her stomach. She underwent BRAVO testing with normal [place]. Regurgitation had a +SAP with a -SI. Overall, normal study. \par \par Patient states she continues to have improvement in defecation with PT. She does not take anything else for helping with BMs. She states she was taking a sleep aid OTC that has melatonin at night and states this helped her throat burning in the morning however then would return in the day. She states she takes famotidine BID that also helps for a little. She does not take amitriptyline.  \par

## 2022-11-28 NOTE — ASSESSMENT
[FreeTextEntry1] : 47 year old female with history of fibromyalgia, chronic constipation with type 2 dyssynergia, chronic abdominal bloating, functional heartburn, and chronic left sided abdominal pain. She has had a fairly extensive workup with normal TSH, negative EGD, colonoscopy, hydrogen breath test, BRAVO and Sitz marker tests. She had ARM with type 2 dyssynergia and has been improving with biofeedback therapy however has continued functional heartburn symptoms. She has self-discontinued Linzess [due to cost], Miralax [due to "dysuria"] and Benefiber/Psyllium husk [due to excess bloating] - however today reports psyllium husk helped despite the bloating.\par \par # Chronic abdominal pain, bloating - improved with regular BMs and biofeedback therapy. \par # Functional heartburn - No esophagitis on multiple EGDs, last 9/2022; BRAVO neg. Consistent with functional heartburn. Had some improvement with amitryptilline in the past however stopped working.\par # Type 2 dyssynergia - Associated with feeling of incomplete evacuation, now improved with biofeedback therapy\par \par Plan\par - can c/w famotidine BID as she reports this helps\par - discussed restarting psyllium husk again daily for additive effect to help with defecation with PT - patient states she will try\par - will obtain EKG (sinus natalie in past) and if normal, will start imipramine for functional heartburn - will start at 10mg daily then increase to 20mg daily at 2 weeks for a 6 week trial\par -RTC in 6 weeks to assess symptoms\par \par d/w Dr Lang

## 2022-11-28 NOTE — END OF VISIT
[] : Fellow [Time Spent: ___ minutes] : I have spent [unfilled] minutes of time on the encounter. [FreeTextEntry3] : # Constipation - had ARM done showing pelvic floor dyssynergia with failed balloon expulsion -> improved with pelvic floor PT.  Currently on no meds, will add fiber.\par # CRC Screening - had colon 2020 for altered bowel habits and 2016 for constipation.  Both negative.  May be reasonable for CRC screening, consider doing at age 52\par # Severe burning in chest/throat all day long - negative pH study;  negative endoscopy.  Denies dysphagia, odynophagia.  No concern from medicine re: cardiac issues.  Prior partial response to TCA.  Most likely functional heartburn.  Will give trial of Imipramine.  Can consider SSRI or gabapentin in future or alternative therapies (hypnotherapy, acupuncture, etc).  Need EKG prior to treating.\par \par F/u 6 weeks\par Counselling regarding medication

## 2022-12-02 ENCOUNTER — APPOINTMENT (OUTPATIENT)
Dept: INTERNAL MEDICINE | Facility: CLINIC | Age: 47
End: 2022-12-02

## 2022-12-02 PROCEDURE — ZZZZZ: CPT

## 2022-12-12 NOTE — ED PROVIDER NOTE - PATIENT PORTAL LINK FT
No
You can access the FollowMyHealth Patient Portal offered by [facility] by registering at the following website: [email or web address]. By joining [employer]’s FollowMyHealth portal, you will also be able to view your health information using other applications (apps) compatible with our system.

## 2022-12-16 ENCOUNTER — OUTPATIENT (OUTPATIENT)
Dept: OUTPATIENT SERVICES | Facility: HOSPITAL | Age: 47
LOS: 1 days | End: 2022-12-16
Payer: SELF-PAY

## 2022-12-16 DIAGNOSIS — Z98.890 OTHER SPECIFIED POSTPROCEDURAL STATES: Chronic | ICD-10-CM

## 2022-12-16 DIAGNOSIS — K59.00 CONSTIPATION, UNSPECIFIED: ICD-10-CM

## 2022-12-16 DIAGNOSIS — M62.89 OTHER SPECIFIED DISORDERS OF MUSCLE: ICD-10-CM

## 2022-12-16 DIAGNOSIS — R12 HEARTBURN: ICD-10-CM

## 2022-12-16 PROCEDURE — 93010 ELECTROCARDIOGRAM REPORT: CPT

## 2022-12-16 PROCEDURE — 93005 ELECTROCARDIOGRAM TRACING: CPT

## 2023-01-09 ENCOUNTER — APPOINTMENT (OUTPATIENT)
Dept: INTERNAL MEDICINE | Facility: CLINIC | Age: 48
End: 2023-01-09
Payer: COMMERCIAL

## 2023-01-09 VITALS
DIASTOLIC BLOOD PRESSURE: 80 MMHG | OXYGEN SATURATION: 98 % | HEIGHT: 59 IN | SYSTOLIC BLOOD PRESSURE: 100 MMHG | WEIGHT: 104 LBS | BODY MASS INDEX: 20.96 KG/M2 | HEART RATE: 65 BPM

## 2023-01-09 DIAGNOSIS — H01.119 ALLERGIC DERMATITIS OF UNSPECIFIED EYE, UNSPECIFIED EYELID: ICD-10-CM

## 2023-01-09 PROCEDURE — ZZZZZ: CPT | Mod: GC

## 2023-01-11 PROBLEM — H01.119 EYELID DERMATITIS, ALLERGIC/CONTACT: Status: ACTIVE | Noted: 2023-01-11

## 2023-01-13 ENCOUNTER — APPOINTMENT (OUTPATIENT)
Dept: INTERNAL MEDICINE | Facility: CLINIC | Age: 48
End: 2023-01-13
Payer: COMMERCIAL

## 2023-01-13 ENCOUNTER — OUTPATIENT (OUTPATIENT)
Dept: OUTPATIENT SERVICES | Facility: HOSPITAL | Age: 48
LOS: 1 days | End: 2023-01-13
Payer: SELF-PAY

## 2023-01-13 VITALS
HEART RATE: 50 BPM | DIASTOLIC BLOOD PRESSURE: 80 MMHG | HEIGHT: 59 IN | OXYGEN SATURATION: 98 % | SYSTOLIC BLOOD PRESSURE: 112 MMHG

## 2023-01-13 DIAGNOSIS — R00.1 BRADYCARDIA, UNSPECIFIED: ICD-10-CM

## 2023-01-13 DIAGNOSIS — I10 ESSENTIAL (PRIMARY) HYPERTENSION: ICD-10-CM

## 2023-01-13 DIAGNOSIS — H01.119 ALLERGIC DERMATITIS OF UNSPECIFIED EYE, UNSPECIFIED EYELID: ICD-10-CM

## 2023-01-13 DIAGNOSIS — H00.014 HORDEOLUM EXTERNUM LEFT UPPER EYELID: ICD-10-CM

## 2023-01-13 DIAGNOSIS — Z98.890 OTHER SPECIFIED POSTPROCEDURAL STATES: Chronic | ICD-10-CM

## 2023-01-13 DIAGNOSIS — R19.5 OTHER FECAL ABNORMALITIES: ICD-10-CM

## 2023-01-13 PROCEDURE — ZZZZZ: CPT | Mod: GE

## 2023-01-13 PROCEDURE — G0463: CPT

## 2023-01-13 PROCEDURE — ZZZZZ: CPT

## 2023-01-13 NOTE — PHYSICAL EXAM
[Normal Sclera/Conjunctiva] : normal sclera/conjunctiva [de-identified] : eye mass getting slightly larger but softer, minimally tender to palpation, no erythema at this visit WDL

## 2023-01-13 NOTE — REVIEW OF SYSTEMS
[Pain] : pain [Redness] : redness [Itching] : itching [Negative] : Respiratory [FreeTextEntry3] : eyelid itchiness and pain and erythema

## 2023-01-13 NOTE — HISTORY OF PRESENT ILLNESS
[FreeTextEntry8] : 47F PMH fibromyalgia comes in for acute visit due to eyelid swelling.\par \par She reported that last Wed she used eye makeup for the first time of her life. She used cotton w/ coconut oil to remove the makeup but since then was having swelling, redness, and itchiness of the L eye lid. No fevers, no discharge from the eyelid. She used warm tea bag 10-15min every day around the area for relief which relieves the pain a little bit but not fully better. Denied any vision changes.\par \par Pt was advised to use warm compress BID and RTC today for a f/u visit to monitor progress. Per herself the itchiness is going away and the mild tenderness is still there. She can only do QD warm compress because she has to do a lot of commute in the AM and has no time for warm compresses. She did not try cortisone cream at all.

## 2023-01-14 NOTE — HISTORY OF PRESENT ILLNESS
[FreeTextEntry8] : 47F PMH fibromyalgia comes in for acute visit due to eyelid swelling.\par \par She reported that last Wed she used eye makeup for the first time of her life. She used cotton w/ coconut oil to remove the makeup but since then was having swelling, redness, and itchiness of the L eye lid. No fevers, no discharge from the eyelid. She used warm tea bag 10-15min every day around the area for relief which relieves the pain a little bit but not fully better. Denied any vision changes.

## 2023-01-14 NOTE — END OF VISIT
[] : Resident [Time Spent: ___ minutes] : I have spent [unfilled] minutes of time on the encounter. [FreeTextEntry3] : mild erythema of left upper eyelid. conjunctiva and cornea visually unaffected. no dc, mostly pruritis per pt with mild irritatioin. suspect allergic dermatitis+/- presence of hordeolum. Rx: warm compress /topical low potency cortisone cr to be applied only to local area of upper lid. pt to f/u end of week...ophthm consult of worsen/persists

## 2023-01-14 NOTE — PHYSICAL EXAM
[Normal] : soft, non-tender, non-distended, no masses palpated, no HSM and normal bowel sounds [Normal Sclera/Conjunctiva] : normal sclera/conjunctiva [de-identified] : one hard, non-mobile spot that is tender to palpation at L eye lid, full ROM, minimal erythema and swelling, no dc

## 2023-02-17 ENCOUNTER — APPOINTMENT (OUTPATIENT)
Dept: INTERNAL MEDICINE | Facility: CLINIC | Age: 48
End: 2023-02-17
Payer: COMMERCIAL

## 2023-02-21 ENCOUNTER — APPOINTMENT (OUTPATIENT)
Dept: GASTROENTEROLOGY | Facility: HOSPITAL | Age: 48
End: 2023-02-21
Payer: COMMERCIAL

## 2023-02-21 ENCOUNTER — OUTPATIENT (OUTPATIENT)
Dept: OUTPATIENT SERVICES | Facility: HOSPITAL | Age: 48
LOS: 1 days | End: 2023-02-21
Payer: SELF-PAY

## 2023-02-21 VITALS
TEMPERATURE: 98 F | HEIGHT: 59 IN | SYSTOLIC BLOOD PRESSURE: 108 MMHG | DIASTOLIC BLOOD PRESSURE: 69 MMHG | RESPIRATION RATE: 14 BRPM | HEART RATE: 60 BPM | WEIGHT: 108 LBS | BODY MASS INDEX: 21.77 KG/M2

## 2023-02-21 DIAGNOSIS — R14.0 ABDOMINAL DISTENSION (GASEOUS): ICD-10-CM

## 2023-02-21 DIAGNOSIS — R19.5 OTHER FECAL ABNORMALITIES: ICD-10-CM

## 2023-02-21 DIAGNOSIS — R00.1 BRADYCARDIA, UNSPECIFIED: ICD-10-CM

## 2023-02-21 DIAGNOSIS — R10.9 UNSPECIFIED ABDOMINAL PAIN: ICD-10-CM

## 2023-02-21 DIAGNOSIS — Z98.890 OTHER SPECIFIED POSTPROCEDURAL STATES: Chronic | ICD-10-CM

## 2023-02-21 DIAGNOSIS — K21.9 GASTRO-ESOPHAGEAL REFLUX DISEASE WITHOUT ESOPHAGITIS: ICD-10-CM

## 2023-02-21 PROCEDURE — G0463: CPT

## 2023-02-21 PROCEDURE — ZZZZZ: CPT | Mod: GC

## 2023-02-21 NOTE — REASON FOR VISIT
[Follow-up] : a follow-up of an existing diagnosis [FreeTextEntry1] : acid reflux, globus, foul smelling stool

## 2023-02-21 NOTE — PHYSICAL EXAM
[Alert] : alert [Normal Voice/Communication] : normal voice/communication [Healthy Appearing] : healthy appearing [Sclera] : the sclera and conjunctiva were normal [Hearing Threshold Finger Rub Not [place]] : hearing was normal [Normal Appearance] : the appearance of the neck was normal [No Respiratory Distress] : no respiratory distress [No Acc Muscle Use] : no accessory muscle use [Heart Rate And Rhythm] : heart rate was normal and rhythm regular [Normal S1, S2] : normal S1 and S2 [Bowel Sounds] : normal bowel sounds [Abdomen Tenderness] : non-tender [Abdomen Soft] : soft [Abnormal Walk] : normal gait [No Clubbing, Cyanosis] : no clubbing or cyanosis of the fingernails [Normal Color / Pigmentation] : normal skin color and pigmentation [] : no rash [Oriented To Time, Place, And Person] : oriented to person, place, and time [Normal Affect] : the affect was normal

## 2023-02-21 NOTE — REVIEW OF SYSTEMS
[Sore Throat] : sore throat [Abdominal Pain] : abdominal pain [Heartburn] : heartburn [Bloating (gassiness)] : bloating [Negative] : Heme/Lymph [Hoarseness] : no hoarseness [Chest Pain] : no chest pain [Shortness Of Breath] : no shortness of breath [Vomiting] : no vomiting [Constipation] : no constipation [Diarrhea] : no diarrhea [Melena (black stool)] : no melena [Bleeding] : no bleeding [Rash] : no rash

## 2023-02-21 NOTE — HISTORY OF PRESENT ILLNESS
[FreeTextEntry1] : 47 year old woman with lactose intolerance, chronic constipation, fibromyalgia and functional dyspepsia who presents for follow up of chronic GI problems.\par \par Today, patient's main concerns are acid reflux, regurgitation, bloating, and persistent sticky foul smelling stool.\par \par #Acid reflux: patient has long standing acid reflux. Pt wakes up with acidic taste in her mouth and sore throat. Had been on famotidine BID but stopped it in December, was not sure whether it was helping. Pt was supposed to start imipramine after last visit however needed EKG prior to initiation. Pt also expressed hesitation regarding initiation. EKG showed bradycardia to 40s.\par Prior BRAVO testing with normal DeMeester. Regurgitation had a +SAP with a -SI. Overall, normal study.  \par \par #Bloating: Pt has long-standing bloating. Also endorses eructation and flatulence, regardless of what she eats. Takes occasional fiber supplementation. Also chewing a lot of gum recently\par \par #Soft stool: Pt states she has had soft stool for two years. Sticky, foul-smelling. No melena, hematochezia.\par \par #Constipation: Pt continues to report that PT had helped with constipation. \par

## 2023-03-03 ENCOUNTER — OUTPATIENT (OUTPATIENT)
Dept: OUTPATIENT SERVICES | Facility: HOSPITAL | Age: 48
LOS: 1 days | End: 2023-03-03
Payer: SELF-PAY

## 2023-03-03 ENCOUNTER — APPOINTMENT (OUTPATIENT)
Dept: INTERNAL MEDICINE | Facility: CLINIC | Age: 48
End: 2023-03-03
Payer: COMMERCIAL

## 2023-03-03 DIAGNOSIS — R00.1 BRADYCARDIA, UNSPECIFIED: ICD-10-CM

## 2023-03-03 DIAGNOSIS — Z98.890 OTHER SPECIFIED POSTPROCEDURAL STATES: Chronic | ICD-10-CM

## 2023-03-03 DIAGNOSIS — I10 ESSENTIAL (PRIMARY) HYPERTENSION: ICD-10-CM

## 2023-03-03 DIAGNOSIS — F43.10 POST-TRAUMATIC STRESS DISORDER, UNSPECIFIED: ICD-10-CM

## 2023-03-03 DIAGNOSIS — H01.119 ALLERGIC DERMATITIS OF UNSPECIFIED EYE, UNSPECIFIED EYELID: ICD-10-CM

## 2023-03-03 DIAGNOSIS — R19.5 OTHER FECAL ABNORMALITIES: ICD-10-CM

## 2023-03-03 PROCEDURE — 90834 PSYTX W PT 45 MINUTES: CPT

## 2023-03-06 ENCOUNTER — OUTPATIENT (OUTPATIENT)
Dept: OUTPATIENT SERVICES | Facility: HOSPITAL | Age: 48
LOS: 1 days | End: 2023-03-06

## 2023-03-06 ENCOUNTER — APPOINTMENT (OUTPATIENT)
Dept: NUCLEAR MEDICINE | Facility: HOSPITAL | Age: 48
End: 2023-03-06
Payer: COMMERCIAL

## 2023-03-06 ENCOUNTER — RESULT REVIEW (OUTPATIENT)
Age: 48
End: 2023-03-06

## 2023-03-06 DIAGNOSIS — Z98.890 OTHER SPECIFIED POSTPROCEDURAL STATES: Chronic | ICD-10-CM

## 2023-03-06 DIAGNOSIS — R10.9 UNSPECIFIED ABDOMINAL PAIN: ICD-10-CM

## 2023-03-06 PROCEDURE — 78264 GASTRIC EMPTYING IMG STUDY: CPT | Mod: 26
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
## 2023-03-09 ENCOUNTER — NON-APPOINTMENT (OUTPATIENT)
Age: 48
End: 2023-03-09

## 2023-03-09 ENCOUNTER — APPOINTMENT (OUTPATIENT)
Dept: CARDIOLOGY | Facility: HOSPITAL | Age: 48
End: 2023-03-09

## 2023-03-09 ENCOUNTER — OUTPATIENT (OUTPATIENT)
Dept: OUTPATIENT SERVICES | Facility: HOSPITAL | Age: 48
LOS: 1 days | End: 2023-03-09
Payer: SELF-PAY

## 2023-03-09 VITALS
SYSTOLIC BLOOD PRESSURE: 112 MMHG | HEIGHT: 59 IN | DIASTOLIC BLOOD PRESSURE: 74 MMHG | OXYGEN SATURATION: 98 % | HEART RATE: 70 BPM

## 2023-03-09 DIAGNOSIS — I25.10 ATHEROSCLEROTIC HEART DISEASE OF NATIVE CORONARY ARTERY WITHOUT ANGINA PECTORIS: ICD-10-CM

## 2023-03-09 DIAGNOSIS — R00.1 BRADYCARDIA, UNSPECIFIED: ICD-10-CM

## 2023-03-09 DIAGNOSIS — Z98.890 OTHER SPECIFIED POSTPROCEDURAL STATES: Chronic | ICD-10-CM

## 2023-03-09 PROCEDURE — 93005 ELECTROCARDIOGRAM TRACING: CPT

## 2023-03-09 PROCEDURE — G0463: CPT

## 2023-03-10 PROBLEM — R00.1 BRADYCARDIA: Status: ACTIVE | Noted: 2023-02-21

## 2023-03-10 NOTE — HISTORY OF PRESENT ILLNESS
[FreeTextEntry1] : 47F with Fibromyalgia, functional dyspepsia, gerd (normal EGD/c-scope/SIBO negative) plan to start amitriptyline. Current complaints are related to constipation, abdominal pain, reflux symptoms. \par \par Patient reports no shortness of breath she is limited in exercise due to muscle pain. Does not have any LE edema. Patient uses 3 pillows to sleep but when she lays flat she has no dyspnea the pillows are for Acid reflux. No palpitations. \par \par Patient able to exercise for 45 minutes with intermittent jogging. \par \par Medications: \par Famotidine 20 mg QHS \par neobianacid\par \par Surgeries: \par Left ovarian removal  \par \par Social: \par No smoking \par No Alcohol \par No illicit drug use \par \par Works as cleaning \par From [facility] moved 17 years ago \par \par Family history: \par Grandfather  due to heart condition in 90s \par \par 22: Lipid panel Cholesterol 161, TG 89, HDL 64, LDL 79\par TSH WNL, Hemoglobin a1c 5.1

## 2023-03-10 NOTE — REVIEW OF SYSTEMS
[Abdominal Pain] : abdominal pain [Vomiting] : vomiting [Heartburn] : heartburn [Change in Appetite] : change in appetite [Change In The Stool] : change in stool [Dysphagia] : dysphagia [Negative] : Heme/Lymph [Diarrhea] : diarrhea

## 2023-03-13 DIAGNOSIS — R00.1 BRADYCARDIA, UNSPECIFIED: ICD-10-CM

## 2023-03-24 ENCOUNTER — OUTPATIENT (OUTPATIENT)
Dept: OUTPATIENT SERVICES | Facility: HOSPITAL | Age: 48
LOS: 1 days | End: 2023-03-24
Payer: SELF-PAY

## 2023-03-24 ENCOUNTER — APPOINTMENT (OUTPATIENT)
Dept: INTERNAL MEDICINE | Facility: CLINIC | Age: 48
End: 2023-03-24
Payer: COMMERCIAL

## 2023-03-24 VITALS
DIASTOLIC BLOOD PRESSURE: 68 MMHG | OXYGEN SATURATION: 98 % | BODY MASS INDEX: 21.97 KG/M2 | HEART RATE: 55 BPM | WEIGHT: 109 LBS | HEIGHT: 59 IN | SYSTOLIC BLOOD PRESSURE: 118 MMHG

## 2023-03-24 DIAGNOSIS — I10 ESSENTIAL (PRIMARY) HYPERTENSION: ICD-10-CM

## 2023-03-24 DIAGNOSIS — F43.10 POST-TRAUMATIC STRESS DISORDER, UNSPECIFIED: ICD-10-CM

## 2023-03-24 DIAGNOSIS — M19.90 UNSPECIFIED OSTEOARTHRITIS, UNSPECIFIED SITE: ICD-10-CM

## 2023-03-24 PROCEDURE — 90792 PSYCH DIAG EVAL W/MED SRVCS: CPT

## 2023-03-24 PROCEDURE — G0463: CPT

## 2023-03-24 PROCEDURE — ZZZZZ: CPT | Mod: GE

## 2023-03-24 PROCEDURE — ZZZZZ: CPT

## 2023-03-24 NOTE — HISTORY OF PRESENT ILLNESS
[FreeTextEntry1] : B/l Knee pain  [de-identified] : 47F PMH fibromyalgia comes in for acute visit due to b/l knee pain. Denies any trauma to the knee joint. States pain exacerbated when she is squatting at work as a . Denies any clicks or catching when she is walking. Endorses mild swelling of the knee joints bilaterally. Denies any temperature changes of the knees. Uses Diclofenac topical formulation which helps with the pain.

## 2023-03-24 NOTE — ASSESSMENT
[FreeTextEntry1] : 46 y/o woman presenting with b/l knee pain. \par \par #Knee pain:\par -Xray from Nov 2022 reassuring, no evidence of current acute fracture, no TTP on patellar joint, no evidence of effusion or c/f bursitis currently. \par -Negative provocative tests\par -Possible over-use injury\par -Explained the benefit of icing, voltaren gel, and conservative measures\par -C/w physical therapy\par -F/u if pain not resolving in the office. \par \par \par \par Plan d/w Dr. Johnson and patient in the office.

## 2023-03-24 NOTE — PHYSICAL EXAM
[Normal] : normal rate, regular rhythm, normal S1 and S2 and no murmur heard [de-identified] : negative anterior and posterior drawer. No effusions noted. Negative catina's test. Normal gait. Able to squat.

## 2023-03-24 NOTE — REVIEW OF SYSTEMS
[Joint Pain] : joint pain [Negative] : Genitourinary [Fever] : no fever [Chills] : no chills [Night Sweats] : no night sweats [Recent Change In Weight] : ~T no recent weight change [Dysuria] : no dysuria [Incontinence] : no incontinence [FreeTextEntry9] : + b/l knee joint pain.

## 2023-03-27 DIAGNOSIS — M19.90 UNSPECIFIED OSTEOARTHRITIS, UNSPECIFIED SITE: ICD-10-CM

## 2023-04-12 NOTE — ASU DISCHARGE PLAN (ADULT/PEDIATRIC) - PROCEDURE
Colonoscopy Anesthesia Type: 1% lidocaine with 1:200,000 epinephrine and a 1:10 solution of 8.4% sodium bicarbonate and 408mcg clindamycin/ml

## 2023-04-21 ENCOUNTER — APPOINTMENT (OUTPATIENT)
Dept: INTERNAL MEDICINE | Facility: CLINIC | Age: 48
End: 2023-04-21

## 2023-04-25 ENCOUNTER — APPOINTMENT (OUTPATIENT)
Dept: GASTROENTEROLOGY | Facility: HOSPITAL | Age: 48
End: 2023-04-25
Payer: COMMERCIAL

## 2023-04-25 ENCOUNTER — OUTPATIENT (OUTPATIENT)
Dept: OUTPATIENT SERVICES | Facility: HOSPITAL | Age: 48
LOS: 1 days | End: 2023-04-25
Payer: SELF-PAY

## 2023-04-25 VITALS
WEIGHT: 104 LBS | BODY MASS INDEX: 20.96 KG/M2 | HEIGHT: 59 IN | SYSTOLIC BLOOD PRESSURE: 117 MMHG | DIASTOLIC BLOOD PRESSURE: 73 MMHG | TEMPERATURE: 97 F | HEART RATE: 57 BPM | RESPIRATION RATE: 14 BRPM

## 2023-04-25 DIAGNOSIS — R10.9 UNSPECIFIED ABDOMINAL PAIN: ICD-10-CM

## 2023-04-25 DIAGNOSIS — Z98.890 OTHER SPECIFIED POSTPROCEDURAL STATES: Chronic | ICD-10-CM

## 2023-04-25 DIAGNOSIS — R10.13 EPIGASTRIC PAIN: ICD-10-CM

## 2023-04-25 PROCEDURE — G0463: CPT

## 2023-04-25 PROCEDURE — ZZZZZ: CPT | Mod: GC

## 2023-04-25 RX ORDER — IMIPRAMINE HYDROCHLORIDE 10 MG/1
10 TABLET ORAL
Qty: 30 | Refills: 0 | Status: DISCONTINUED | COMMUNITY
Start: 2023-03-21 | End: 2023-04-25

## 2023-04-25 RX ORDER — CITALOPRAM HYDROBROMIDE 10 MG/1
10 TABLET, FILM COATED ORAL DAILY
Qty: 30 | Refills: 0 | Status: DISCONTINUED | COMMUNITY
Start: 2023-04-25 | End: 2023-04-25

## 2023-04-25 NOTE — PHYSICAL EXAM
[Alert] : alert [Normal Voice/Communication] : normal voice/communication [Sclera] : the sclera and conjunctiva were normal [Hearing Threshold Finger Rub Not [place]] : hearing was normal [Normal Appearance] : the appearance of the neck was normal [No Respiratory Distress] : no respiratory distress [No Acc Muscle Use] : no accessory muscle use [Heart Rate And Rhythm] : heart rate was normal and rhythm regular [Bowel Sounds] : normal bowel sounds [Abdomen Tenderness] : non-tender [No Masses] : no abdominal mass palpated [Abdomen Soft] : soft [Abnormal Walk] : normal gait [Normal Color / Pigmentation] : normal skin color and pigmentation [Oriented To Time, Place, And Person] : oriented to person, place, and time

## 2023-04-26 NOTE — HISTORY OF PRESENT ILLNESS
[FreeTextEntry1] : 47 year old woman with lactose intolerance, chronic constipation, fibromyalgia and functional dyspepsia who presents for follow up of chronic GI problems.Today, patient's main concerns are acid reflux and regurgitation.\par \par #Acid reflux: patient has long standing acid reflux. Pt wakes up with acidic taste in her mouth and has a persistent sore throat. The patient is taking famotidine again and states it decreases her duration of heart burn but does not completely take away her symptoms. \par She was evaluated by cardiology for her bradycardia and was subsequently started by us on imipramine. She took it for 4 days but states she had a worsening of her reflux so she stopped it. Notably, patient stopped taking the famotidine prior to starting the imipramine.\par In the past, amitriptyline worked for her but stopped helping about 6 months into treatment.\par \par Since her last visit in 2/2023, she has also had a negative gastric emptying study. Prior BRAVO testing revealed normal DeMeester. Regurgitation had a +SAP with a -SI. Overall, normal study. \par \par

## 2023-04-26 NOTE — ASSESSMENT
[FreeTextEntry1] : 47 year old woman with lactose intolerance, chronic constipation, fibromyalgia and functional dyspepsia who presents for follow up of chronic GI problems.Today, patient's main concerns are acid reflux and regurgitation.\par \par #Functional dyspepsia/heartburn: Patient s/p extensive prior w/u. Amitriptyline helped with sx relief in the past, but patient says it stopped helping after 6 months. She had a recent trial of imipramine, which she stopped after 4 days due to lack of relief in symptoms. I explained that it requires more time to be effective, however she does not want to restart it at this time as it was also associated with sleep issues for her.\par \par Plan\par - continue on famotidine BID \par - will start on trial of duloxetine 30 mg daily for her functional heartburn\par - RTC 1 month\par \par Karissa Rahman PGY5\par GI/Hepatology Fellow\par D/w Dr. Palma. \par

## 2023-04-26 NOTE — END OF VISIT
[] : Fellow [FreeTextEntry3] : As modified and discussed with patient\par MD KERRY Batres [facility]\par Associate Professor of Medicine\par [facility] [facility] [employer]\par

## 2023-04-26 NOTE — REVIEW OF SYSTEMS
[Heartburn] : heartburn [Bloating (gassiness)] : bloating [Fever] : no fever [Chills] : no chills [Chest Pain] : no chest pain [Shortness Of Breath] : no shortness of breath [Abdominal Pain] : no abdominal pain [Vomiting] : no vomiting [Constipation] : no constipation [Diarrhea] : no diarrhea [Melena (black stool)] : no melena [Bleeding] : no bleeding [Skin Lesions] : no skin lesions [Confused] : no confusion

## 2023-04-27 DIAGNOSIS — R10.13 EPIGASTRIC PAIN: ICD-10-CM

## 2023-04-27 DIAGNOSIS — K59.09 OTHER CONSTIPATION: ICD-10-CM

## 2023-04-27 DIAGNOSIS — K21.9 GASTRO-ESOPHAGEAL REFLUX DISEASE WITHOUT ESOPHAGITIS: ICD-10-CM

## 2023-05-22 ENCOUNTER — APPOINTMENT (OUTPATIENT)
Dept: INTERNAL MEDICINE | Facility: CLINIC | Age: 48
End: 2023-05-22
Payer: COMMERCIAL

## 2023-05-22 ENCOUNTER — OUTPATIENT (OUTPATIENT)
Dept: OUTPATIENT SERVICES | Facility: HOSPITAL | Age: 48
LOS: 1 days | End: 2023-05-22
Payer: SELF-PAY

## 2023-05-22 VITALS
OXYGEN SATURATION: 98 % | HEIGHT: 59 IN | SYSTOLIC BLOOD PRESSURE: 112 MMHG | DIASTOLIC BLOOD PRESSURE: 60 MMHG | BODY MASS INDEX: 20.56 KG/M2 | HEART RATE: 69 BPM | WEIGHT: 102 LBS

## 2023-05-22 DIAGNOSIS — Z98.890 OTHER SPECIFIED POSTPROCEDURAL STATES: Chronic | ICD-10-CM

## 2023-05-22 DIAGNOSIS — I10 ESSENTIAL (PRIMARY) HYPERTENSION: ICD-10-CM

## 2023-05-22 PROCEDURE — ZZZZZ: CPT | Mod: GC

## 2023-05-22 PROCEDURE — G0463: CPT

## 2023-05-22 NOTE — ED ADULT NURSE NOTE - NS ED NURSE RECORD ANOTHER VITAL SIGN
1. Follow up with Dr. Del Castillo within 1 week. Call to make an appointment.  2. Continue to take your medications as prescribed.  3. Brilinta was sent to your pharmacy. Please pick it up today and start taking it tomorrow. Yes

## 2023-05-26 NOTE — ASSESSMENT
[FreeTextEntry1] : 48 yo F with pmh of fibromyalgia presented with L breast pain. \par \par #breast pain:\par Electric-shock like sensation suggested nerve pain, most likely due to cyst or mass pressing on the area. No surgery to the breast prior and unlikely trauma-induced. Pain was persistent throughout post-periods. No palpable mass, skin changes or bloody nipple discharge which would rule in malignancy. Last mammogram in May 2022 showed cyst with possible intraductal mass in L breast, multiple bilateral breast cysts with path showed stromal fibrosis, apocrine metaplasis and focal accumulation of histocytes. No children or prior pregnancy, not lactating. No signs or symptoms of infection. Given non-cyclic and unilateral breast pain, would benefit from imaging. \par -US and mammogram of breast \par -f/u with her surgical oncologist \par -Tylenol PRN for pain\par \par #thickened endometrium:\par US in Sep 2022 showed persistent thickened endometrium with associated endometrial polyp and R ovarian cyst. However, US was done during menstrual period, so would need to be repeat post-menstruation. For 10 years, she has experienced little clots with periods but no real bleeding flow. TSH last time was wnl. \par -f/u with GYN for repeat US and possible thickened endometrium \par \par

## 2023-05-26 NOTE — PHYSICAL EXAM
[Normal] : normal gait, coordination grossly intact, no focal deficits and deep tendon reflexes were 2+ and symmetric [de-identified] : Small lumps felt on R breast. No surgical scars noted. Pain on palpation on both breast. No axillary and cervical lymphadenopathy.

## 2023-05-26 NOTE — HISTORY OF PRESENT ILLNESS
[FreeTextEntry8] : 48 yo F with pmh of fibromyalgia presented with L breast pain. \par Patient stated that she usually had some soreness with both breasts 6-7 days prior to periods which resolved after the menstrual period is finished. However, for about 1 month now, she has been having a different type of pain on L breast which felt like an electric-shock even at rest, regardless of activities or positions. There is no recent trauma/ injury to the area. She denied nipple discharge, bleeding bilaterally. Aunt with bone cancer, mom with skin cancer, grandma with ovarian cancer, uncle with throat cancer, aunt with skin, and head cancer. Mammogram in 2022 showed cyst with possible intraductal mass in L breast and multiple bilateral breast cysts. She has not followed up with her surgical oncologist since the pathology report. \par In addition, she had L ovarian/ fallopian tube removal in 2020. She recently saw GYN and was told that she has thickened endometrial lining, however, it was when she has menstrual period so she was instructed to have a repeat U/S after period. \par She recently saw GI for functional dyspepsia/ heart burn and started famotidine and duloxetine 1 week ago. EGD with path showed negative H. pylori, negative for intestinal metaplasia and chronic mild gastritis. Also recently saw psychologist. \par

## 2023-05-30 DIAGNOSIS — N64.4 MASTODYNIA: ICD-10-CM

## 2023-06-09 ENCOUNTER — OUTPATIENT (OUTPATIENT)
Dept: OUTPATIENT SERVICES | Facility: HOSPITAL | Age: 48
LOS: 1 days | End: 2023-06-09
Payer: SELF-PAY

## 2023-06-09 ENCOUNTER — APPOINTMENT (OUTPATIENT)
Dept: INTERNAL MEDICINE | Facility: CLINIC | Age: 48
End: 2023-06-09
Payer: COMMERCIAL

## 2023-06-09 DIAGNOSIS — Z98.890 OTHER SPECIFIED POSTPROCEDURAL STATES: Chronic | ICD-10-CM

## 2023-06-09 DIAGNOSIS — F43.10 POST-TRAUMATIC STRESS DISORDER, UNSPECIFIED: ICD-10-CM

## 2023-06-09 DIAGNOSIS — I10 ESSENTIAL (PRIMARY) HYPERTENSION: ICD-10-CM

## 2023-06-09 PROCEDURE — 90834 PSYTX W PT 45 MINUTES: CPT

## 2023-06-09 PROCEDURE — ZZZZZ: CPT

## 2023-06-14 DIAGNOSIS — N93.9 ABNORMAL UTERINE AND VAGINAL BLEEDING, UNSPECIFIED: ICD-10-CM

## 2023-06-16 ENCOUNTER — RESULT REVIEW (OUTPATIENT)
Age: 48
End: 2023-06-16

## 2023-06-16 ENCOUNTER — APPOINTMENT (OUTPATIENT)
Dept: ULTRASOUND IMAGING | Facility: IMAGING CENTER | Age: 48
End: 2023-06-16
Payer: SELF-PAY

## 2023-06-16 ENCOUNTER — APPOINTMENT (OUTPATIENT)
Dept: MAMMOGRAPHY | Facility: IMAGING CENTER | Age: 48
End: 2023-06-16
Payer: SELF-PAY

## 2023-06-16 ENCOUNTER — OUTPATIENT (OUTPATIENT)
Dept: OUTPATIENT SERVICES | Facility: HOSPITAL | Age: 48
LOS: 1 days | End: 2023-06-16
Payer: SELF-PAY

## 2023-06-16 DIAGNOSIS — Z98.890 OTHER SPECIFIED POSTPROCEDURAL STATES: Chronic | ICD-10-CM

## 2023-06-16 DIAGNOSIS — N63.0 UNSPECIFIED LUMP IN UNSPECIFIED BREAST: ICD-10-CM

## 2023-06-16 PROCEDURE — 77067 SCR MAMMO BI INCL CAD: CPT

## 2023-06-16 PROCEDURE — 77063 BREAST TOMOSYNTHESIS BI: CPT

## 2023-06-16 PROCEDURE — 76641 ULTRASOUND BREAST COMPLETE: CPT

## 2023-06-16 PROCEDURE — 77067 SCR MAMMO BI INCL CAD: CPT | Mod: 26

## 2023-06-16 PROCEDURE — 77063 BREAST TOMOSYNTHESIS BI: CPT | Mod: 26

## 2023-06-16 PROCEDURE — 76641 ULTRASOUND BREAST COMPLETE: CPT | Mod: 26,50

## 2023-06-23 ENCOUNTER — APPOINTMENT (OUTPATIENT)
Dept: MAMMOGRAPHY | Facility: IMAGING CENTER | Age: 48
End: 2023-06-23
Payer: SELF-PAY

## 2023-06-23 ENCOUNTER — OUTPATIENT (OUTPATIENT)
Dept: OUTPATIENT SERVICES | Facility: HOSPITAL | Age: 48
LOS: 1 days | End: 2023-06-23
Payer: SELF-PAY

## 2023-06-23 ENCOUNTER — RESULT REVIEW (OUTPATIENT)
Age: 48
End: 2023-06-23

## 2023-06-23 ENCOUNTER — APPOINTMENT (OUTPATIENT)
Dept: ULTRASOUND IMAGING | Facility: IMAGING CENTER | Age: 48
End: 2023-06-23
Payer: SELF-PAY

## 2023-06-23 ENCOUNTER — APPOINTMENT (OUTPATIENT)
Dept: INTERNAL MEDICINE | Facility: CLINIC | Age: 48
End: 2023-06-23
Payer: COMMERCIAL

## 2023-06-23 DIAGNOSIS — Z98.890 OTHER SPECIFIED POSTPROCEDURAL STATES: Chronic | ICD-10-CM

## 2023-06-23 DIAGNOSIS — Z00.8 ENCOUNTER FOR OTHER GENERAL EXAMINATION: ICD-10-CM

## 2023-06-23 PROCEDURE — 77065 DX MAMMO INCL CAD UNI: CPT | Mod: 26,LT

## 2023-06-23 PROCEDURE — G0279: CPT

## 2023-06-23 PROCEDURE — 76642 ULTRASOUND BREAST LIMITED: CPT

## 2023-06-23 PROCEDURE — G0279: CPT | Mod: 26

## 2023-06-23 PROCEDURE — 76642 ULTRASOUND BREAST LIMITED: CPT | Mod: 26,LT

## 2023-06-23 PROCEDURE — 77065 DX MAMMO INCL CAD UNI: CPT

## 2023-06-27 ENCOUNTER — OUTPATIENT (OUTPATIENT)
Dept: OUTPATIENT SERVICES | Facility: HOSPITAL | Age: 48
LOS: 1 days | End: 2023-06-27
Payer: SELF-PAY

## 2023-06-27 ENCOUNTER — APPOINTMENT (OUTPATIENT)
Dept: GASTROENTEROLOGY | Facility: HOSPITAL | Age: 48
End: 2023-06-27
Payer: COMMERCIAL

## 2023-06-27 VITALS
HEART RATE: 67 BPM | WEIGHT: 105 LBS | DIASTOLIC BLOOD PRESSURE: 62 MMHG | RESPIRATION RATE: 16 BRPM | SYSTOLIC BLOOD PRESSURE: 110 MMHG | TEMPERATURE: 98.2 F | HEIGHT: 59 IN | BODY MASS INDEX: 21.17 KG/M2

## 2023-06-27 DIAGNOSIS — K21.9 GASTRO-ESOPHAGEAL REFLUX DISEASE WITHOUT ESOPHAGITIS: ICD-10-CM

## 2023-06-27 DIAGNOSIS — R14.0 ABDOMINAL DISTENSION (GASEOUS): ICD-10-CM

## 2023-06-27 DIAGNOSIS — K31.9 DISEASE OF STOMACH AND DUODENUM, UNSPECIFIED: ICD-10-CM

## 2023-06-27 DIAGNOSIS — R19.5 OTHER FECAL ABNORMALITIES: ICD-10-CM

## 2023-06-27 DIAGNOSIS — Z98.890 OTHER SPECIFIED POSTPROCEDURAL STATES: Chronic | ICD-10-CM

## 2023-06-27 DIAGNOSIS — R10.9 UNSPECIFIED ABDOMINAL PAIN: ICD-10-CM

## 2023-06-27 PROCEDURE — G0463: CPT

## 2023-06-27 PROCEDURE — ZZZZZ: CPT | Mod: GC

## 2023-06-30 ENCOUNTER — RESULT REVIEW (OUTPATIENT)
Age: 48
End: 2023-06-30

## 2023-06-30 ENCOUNTER — APPOINTMENT (OUTPATIENT)
Dept: ULTRASOUND IMAGING | Facility: IMAGING CENTER | Age: 48
End: 2023-06-30
Payer: SELF-PAY

## 2023-06-30 ENCOUNTER — OUTPATIENT (OUTPATIENT)
Dept: OUTPATIENT SERVICES | Facility: HOSPITAL | Age: 48
LOS: 1 days | End: 2023-06-30
Payer: SELF-PAY

## 2023-06-30 DIAGNOSIS — Z98.890 OTHER SPECIFIED POSTPROCEDURAL STATES: Chronic | ICD-10-CM

## 2023-06-30 DIAGNOSIS — Z00.8 ENCOUNTER FOR OTHER GENERAL EXAMINATION: ICD-10-CM

## 2023-06-30 PROCEDURE — 88305 TISSUE EXAM BY PATHOLOGIST: CPT

## 2023-06-30 PROCEDURE — A4648: CPT

## 2023-06-30 PROCEDURE — 88305 TISSUE EXAM BY PATHOLOGIST: CPT | Mod: 26

## 2023-06-30 PROCEDURE — 19083 BX BREAST 1ST LESION US IMAG: CPT

## 2023-06-30 PROCEDURE — 77065 DX MAMMO INCL CAD UNI: CPT

## 2023-06-30 PROCEDURE — 19083 BX BREAST 1ST LESION US IMAG: CPT | Mod: LT

## 2023-06-30 PROCEDURE — 77065 DX MAMMO INCL CAD UNI: CPT | Mod: 26,LT

## 2023-06-30 NOTE — PHYSICAL EXAM
[Alert] : alert [Normal Voice/Communication] : normal voice/communication [Sclera] : the sclera and conjunctiva were normal [Hearing Threshold Finger Rub Not Cook] : hearing was normal [Normal Appearance] : the appearance of the neck was normal [No Respiratory Distress] : no respiratory distress [No Acc Muscle Use] : no accessory muscle use [Heart Rate And Rhythm] : heart rate was normal and rhythm regular [Bowel Sounds] : normal bowel sounds [Abdomen Tenderness] : non-tender [Abnormal Walk] : normal gait [Normal Color / Pigmentation] : normal skin color and pigmentation [Oriented To Time, Place, And Person] : oriented to person, place, and time

## 2023-07-06 ENCOUNTER — NON-APPOINTMENT (OUTPATIENT)
Age: 48
End: 2023-07-06

## 2023-07-06 ENCOUNTER — OUTPATIENT (OUTPATIENT)
Dept: OUTPATIENT SERVICES | Facility: HOSPITAL | Age: 48
LOS: 1 days | End: 2023-07-06
Payer: SELF-PAY

## 2023-07-06 ENCOUNTER — APPOINTMENT (OUTPATIENT)
Dept: OBGYN | Facility: CLINIC | Age: 48
End: 2023-07-06
Payer: COMMERCIAL

## 2023-07-06 ENCOUNTER — LABORATORY RESULT (OUTPATIENT)
Age: 48
End: 2023-07-06

## 2023-07-06 ENCOUNTER — RESULT REVIEW (OUTPATIENT)
Age: 48
End: 2023-07-06

## 2023-07-06 VITALS
SYSTOLIC BLOOD PRESSURE: 112 MMHG | DIASTOLIC BLOOD PRESSURE: 70 MMHG | WEIGHT: 105 LBS | BODY MASS INDEX: 21.17 KG/M2 | HEIGHT: 59 IN

## 2023-07-06 DIAGNOSIS — R93.5 ABNORMAL FINDINGS ON DIAGNOSTIC IMAGING OF OTHER ABDOMINAL REGIONS, INCLUDING RETROPERITONEUM: ICD-10-CM

## 2023-07-06 DIAGNOSIS — Z98.890 OTHER SPECIFIED POSTPROCEDURAL STATES: Chronic | ICD-10-CM

## 2023-07-06 DIAGNOSIS — N76.0 ACUTE VAGINITIS: ICD-10-CM

## 2023-07-06 DIAGNOSIS — Z01.419 ENCOUNTER FOR GYNECOLOGICAL EXAMINATION (GENERAL) (ROUTINE) W/OUT ABNORMAL FINDINGS: ICD-10-CM

## 2023-07-06 PROCEDURE — 87491 CHLMYD TRACH DNA AMP PROBE: CPT

## 2023-07-06 PROCEDURE — 87624 HPV HI-RISK TYP POOLED RSLT: CPT

## 2023-07-06 PROCEDURE — G0463: CPT

## 2023-07-06 PROCEDURE — 99396 PREV VISIT EST AGE 40-64: CPT

## 2023-07-06 PROCEDURE — 87591 N.GONORRHOEAE DNA AMP PROB: CPT

## 2023-07-06 NOTE — HISTORY OF PRESENT ILLNESS
[Definite:  ___ (Date)] : the last menstrual period was [unfilled] [Normal Amount/Duration] : was of a normal amount and duration [Spotting Between  Menses] : no spotting between menses [Sexually Active] : is sexually active

## 2023-07-07 LAB
C TRACH RRNA SPEC QL NAA+PROBE: SIGNIFICANT CHANGE UP
HPV HIGH+LOW RISK DNA PNL CVX: SIGNIFICANT CHANGE UP
N GONORRHOEA RRNA SPEC QL NAA+PROBE: SIGNIFICANT CHANGE UP
SPECIMEN SOURCE: SIGNIFICANT CHANGE UP

## 2023-07-08 LAB — CYTOLOGY SPEC DOC CYTO: SIGNIFICANT CHANGE UP

## 2023-07-10 ENCOUNTER — APPOINTMENT (OUTPATIENT)
Dept: INTERNAL MEDICINE | Facility: CLINIC | Age: 48
End: 2023-07-10
Payer: COMMERCIAL

## 2023-07-10 ENCOUNTER — OUTPATIENT (OUTPATIENT)
Dept: OUTPATIENT SERVICES | Facility: HOSPITAL | Age: 48
LOS: 1 days | End: 2023-07-10
Payer: SELF-PAY

## 2023-07-10 DIAGNOSIS — Z98.890 OTHER SPECIFIED POSTPROCEDURAL STATES: Chronic | ICD-10-CM

## 2023-07-10 DIAGNOSIS — I10 ESSENTIAL (PRIMARY) HYPERTENSION: ICD-10-CM

## 2023-07-10 PROCEDURE — ZZZZZ: CPT

## 2023-07-10 PROCEDURE — 90834 PSYTX W PT 45 MINUTES: CPT

## 2023-07-11 DIAGNOSIS — F43.10 POST-TRAUMATIC STRESS DISORDER, UNSPECIFIED: ICD-10-CM

## 2023-07-13 ENCOUNTER — OUTPATIENT (OUTPATIENT)
Dept: OUTPATIENT SERVICES | Facility: HOSPITAL | Age: 48
LOS: 1 days | End: 2023-07-13
Payer: SELF-PAY

## 2023-07-13 ENCOUNTER — APPOINTMENT (OUTPATIENT)
Dept: DERMATOLOGY | Facility: HOSPITAL | Age: 48
End: 2023-07-13
Payer: COMMERCIAL

## 2023-07-13 ENCOUNTER — APPOINTMENT (OUTPATIENT)
Dept: SURGICAL ONCOLOGY | Facility: CLINIC | Age: 48
End: 2023-07-13
Payer: COMMERCIAL

## 2023-07-13 VITALS
RESPIRATION RATE: 16 BRPM | SYSTOLIC BLOOD PRESSURE: 118 MMHG | HEIGHT: 59 IN | HEART RATE: 72 BPM | OXYGEN SATURATION: 98 % | WEIGHT: 107 LBS | BODY MASS INDEX: 21.57 KG/M2 | DIASTOLIC BLOOD PRESSURE: 76 MMHG

## 2023-07-13 VITALS
SYSTOLIC BLOOD PRESSURE: 113 MMHG | RESPIRATION RATE: 16 BRPM | HEART RATE: 66 BPM | WEIGHT: 105 LBS | BODY MASS INDEX: 21.17 KG/M2 | DIASTOLIC BLOOD PRESSURE: 75 MMHG | TEMPERATURE: 98 F | HEIGHT: 59 IN

## 2023-07-13 DIAGNOSIS — R92.8 OTHER ABNORMAL AND INCONCLUSIVE FINDINGS ON DIAGNOSTIC IMAGING OF BREAST: ICD-10-CM

## 2023-07-13 DIAGNOSIS — D22.9 MELANOCYTIC NEVI, UNSPECIFIED: ICD-10-CM

## 2023-07-13 DIAGNOSIS — L81.1 CHLOASMA: ICD-10-CM

## 2023-07-13 DIAGNOSIS — Z98.890 OTHER SPECIFIED POSTPROCEDURAL STATES: Chronic | ICD-10-CM

## 2023-07-13 DIAGNOSIS — Z01.419 ENCOUNTER FOR GYNECOLOGICAL EXAMINATION (GENERAL) (ROUTINE) WITHOUT ABNORMAL FINDINGS: ICD-10-CM

## 2023-07-13 DIAGNOSIS — L98.9 DISORDER OF THE SKIN AND SUBCUTANEOUS TISSUE, UNSPECIFIED: ICD-10-CM

## 2023-07-13 DIAGNOSIS — R93.5 ABNORMAL FINDINGS ON DIAGNOSTIC IMAGING OF OTHER ABDOMINAL REGIONS, INCLUDING RETROPERITONEUM: ICD-10-CM

## 2023-07-13 DIAGNOSIS — L30.9 DERMATITIS, UNSPECIFIED: ICD-10-CM

## 2023-07-13 DIAGNOSIS — R93.89 ABNORMAL FINDINGS ON DIAGNOSTIC IMAGING OF OTHER SPECIFIED BODY STRUCTURES: ICD-10-CM

## 2023-07-13 PROCEDURE — 99214 OFFICE O/P EST MOD 30 MIN: CPT

## 2023-07-13 PROCEDURE — 99213 OFFICE O/P EST LOW 20 MIN: CPT

## 2023-07-13 PROCEDURE — G0463: CPT

## 2023-07-14 NOTE — RESULTS/DATA
[FreeTextEntry1] : BREAST PATH/RAD REVIEW\par \par [facility]: \par 2/19/2020 Breast MRI: BIRADs 2. B/L cysts ( R up to 2.7, L up to 3.3 cm)\par 8/4/2020 B/L US: BIRADs 2, B/L cysts ( L up to 3.7 cm, R up to 3.4 cm) \par 3/2/201 B/L SC MG/US (TC 8.7%) :BIRADs 2. Extremely dense. R 12:00 & 4:00 at palp AOC c/w multiple simple & complicated cysts up to 3.1 cm, L multiple cysts including cyst cont calcs measuring 5 mm.\par 5/27/2022 B/L SC MG/US (TC 55.3%) : BIRADs 0.Multiple B/L cysts, L 12:00-RA cyst w/ possible intraductal mass ( Rec L Dx US)\par 6/6/2022 L DX US: BIRADs 4B. L 12:00-RA 0.5 cm intracystic mass within a 1.4 cm cyst (Rec USGBx) \par \par 6/14/22 Left USGBx\par ~ Left 12:00 Retroareolar core bx: Breast w/ stromal fibrosis, apocrine metaplasia and focal accumulation of histiocytes. Benign and concordant. Heart clip.( Rec f/u US in 1 yr at time of annual MG) \par \par 6/16/2023 B/L SC MG/ B/L Dx MG (TC 55.6%): BIRADs 0. L UOQ and central outer subtle areas of architectural distortion (Rec L Dx MG/US). B/l scattered simple/complex cysts.\par 6/23/2023 L Dx MG/US: BIRADs 4C. Suspicious L 6N4 0.8 cm irregular hypoechoic mildly shadowing mass ( Rec USGBx and given pts hx may meet criteria for annual SC MRI) \par 6/30/2023 L USGBx\par ~ Left 6N4 core bx: Non- proliferative and focal proliferative fibrocystic change w/ cyst formation, columnar cell change and stromal fibrosis. scant microcalcs present. DISCORDANT. Ribbon Clip (rec Surgical c/s)

## 2023-07-14 NOTE — PHYSICAL EXAM
[Normal] : normal breast inspection and palpation of axillas [Normal Neck Lymph Nodes] : normal neck lymph nodes  [Normal Supraclavicular Lymph Nodes] : normal supraclavicular lymph nodes [Normal Axillary Lymph Nodes] : normal axillary lymph nodes [Normal] : oriented to person, place and time, with appropriate affect [de-identified] : No palpable massses in either breast.  No clinical lymphadenopathy.   Normal bilateral nipple eversion [de-identified] : Normal respiratory effort

## 2023-07-14 NOTE — ASSESSMENT
[FreeTextEntry1] : ANEL DEL REAL is a 47 year F s/p USGBx of Left 6N4 0.8 irregular hypoechoic mass w/ benign findings found to be discordant. \par \par Clinical breast exam is unremarkable. \par I reviewed her imaging with her and the irregular mass appears highly suspicious on ultrasound imaging.  I agree that the benign pathology results are discordant with the imaging.   I recommend excision of this mass for definitive diagnosis.   I also recommend a stat preop MRI to ensure no additional disease prior to surgery.  \par \par Planned procedure:  Left breast lisbeth localized lumpectomy, possible tissue transfer \par [facility] Surgical Date:  8/7/23 depending on availability and preop imaging. \par \par - Pre-operative appointments: Will need PST and lisbeth  placement ( after MRI) \par - Stat Breast MRI to further to further characterize left breast mass and to evaluate for additional suspicious masses \par - RTO POV

## 2023-07-14 NOTE — HISTORY OF PRESENT ILLNESS
[de-identified] : ANEL DEL REAL is a 47 year F who presents for discordant Left USGBx findings \par \par Patient underwent USGBx of Left 6N4 0.8 cm irregular mass with findings of  non- proliferative and focal proliferative fibrocystic change w/ cyst formation, columnar cell change and stromal fibrosis. scant microcalcs present - found to be radiologically discordant.  \par \par Undergoing HR screening, prior hx of b/l cyst aspiration in 2019. Denies any skin changes or nipple discharge, states it hard to tell if shes felt a palpable mass as she has fibrocystic breasts. \par \par PMHx: Depression, \par PSHx: s/p Left ovary and fallopian tube 2/2 ovarian cyst 2020, \par Meds: duloxetine 30 mg, famotidine 20 mg, Melatonin, Fish oil, magnesium, collagen, \par NKDA\par Family Hx: Bone cancer ( paternal aunt), Question of cervical cancer ( Paternal grandmother) ??, Throat cancer (maternal uncle), skin cancer ( mother) ?\par GYN: , menarche age 17, LMP  25\par Bra size: 34 A\par Social: Smoking:Denies       ETOH:Denies \par

## 2023-07-17 ENCOUNTER — APPOINTMENT (OUTPATIENT)
Dept: MRI IMAGING | Facility: IMAGING CENTER | Age: 48
End: 2023-07-17
Payer: SELF-PAY

## 2023-07-17 ENCOUNTER — RESULT REVIEW (OUTPATIENT)
Age: 48
End: 2023-07-17

## 2023-07-17 ENCOUNTER — OUTPATIENT (OUTPATIENT)
Dept: OUTPATIENT SERVICES | Facility: HOSPITAL | Age: 48
LOS: 1 days | End: 2023-07-17
Payer: SELF-PAY

## 2023-07-17 DIAGNOSIS — Z98.890 OTHER SPECIFIED POSTPROCEDURAL STATES: Chronic | ICD-10-CM

## 2023-07-17 DIAGNOSIS — R92.8 OTHER ABNORMAL AND INCONCLUSIVE FINDINGS ON DIAGNOSTIC IMAGING OF BREAST: ICD-10-CM

## 2023-07-17 PROCEDURE — C8937: CPT

## 2023-07-17 PROCEDURE — A9585: CPT

## 2023-07-17 PROCEDURE — C8908: CPT

## 2023-07-17 PROCEDURE — 77049 MRI BREAST C-+ W/CAD BI: CPT | Mod: 26

## 2023-07-18 PROBLEM — R14.0 ABDOMINAL BLOATING: Status: ACTIVE | Noted: 2021-11-09

## 2023-07-18 PROBLEM — R19.5: Status: ACTIVE | Noted: 2023-02-21

## 2023-07-18 NOTE — ASSESSMENT
[FreeTextEntry1] : 47 year old woman with lactose intolerance, chronic constipation, fibromyalgia and functional dyspepsia who presents for follow up of chronic GI problems.Today, patient's main concerns are acid reflux and regurgitation.\par \par #Functional dyspepsia/heartburn: Patient s/p extensive prior w/u. Patient now endorsing improvement in her GERD symptoms since starting duloxetine\par \par #Soft, sticky stool x 2-3 years. Previously diagnosed with Type 2 dyssynergia. Previously tried fiber which may have helped but she is not taking it consistently.\par \par Plan \par - c/w duloxetine 20 mg qd\par - famotidine qd\par - patient instructed to start psyllium (such as Metamucil) 1 scoop BID\par \par Karissa Rahman PGY5\par GI/Hepatology Fellow\par D/w Dr. Palma\par \par \par

## 2023-07-18 NOTE — END OF VISIT
[] : Fellow [FreeTextEntry3] : As modified and discussed with patient\par MD [facility]\par Associate Professor of Medicine\par [facility] [employer]\par

## 2023-07-18 NOTE — HISTORY OF PRESENT ILLNESS
[FreeTextEntry1] : 47 year old woman with lactose intolerance, chronic constipation, fibromyalgia and functional dyspepsia who presents for follow up of chronic GI problems. Her main concerns today are reflux and soft stool.\par \par #Acid reflux 2/2 functional dyspepsia: patient has long standing acid reflux. Pt wakes up with acidic taste in her mouth and has a persistent sore throat. The patient is taking famotidine again and states it decreases her duration of heart burn but does not completely take away her symptoms. \par She was evaluated by cardiology for her bradycardia and was subsequently started by us on imipramine. She took it for 4 days but states she had a worsening of her reflux so she stopped it. Notably, patient stopped taking the famotidine prior to starting the imipramine.\par In the past, amitriptyline worked for her but stopped helping about 6 months into treatment.\par \par She has also had a negative gastric emptying study this past year. Prior BRAVO testing revealed normal [place]. Regurgitation had a +SAP with a -SI. Overall, normal study. \par \par At her last visit 4/2023, patient was started on duloxetine for her functional dyspepsia. She endorses significant improvement in frequency and duration of symptoms and would like to continue on it. She is also still taking once daily famotidine.\par \par #Soft stool: Pt states she has had soft stool for two years. She states it started after she took antibiotics for an infection. Sticky, foul-smelling. No melena, hematochezia. She has tried fiber in the past but has not been using it regularly, does not think it helped.

## 2023-07-18 NOTE — REVIEW OF SYSTEMS
[Constipation] : constipation [Heartburn] : heartburn [Bloating (gassiness)] : bloating [Fever] : no fever [Chills] : no chills [Loss Of Hearing] : no hearing loss [Chest Pain] : no chest pain [Shortness Of Breath] : no shortness of breath [Abdominal Pain] : no abdominal pain [Vomiting] : no vomiting [Diarrhea] : no diarrhea [Melena (black stool)] : no melena [Bleeding] : no bleeding [Fecal Incontinence (soiling)] : no fecal incontinence [Skin Lesions] : no skin lesions

## 2023-07-21 ENCOUNTER — OUTPATIENT (OUTPATIENT)
Dept: OUTPATIENT SERVICES | Facility: HOSPITAL | Age: 48
LOS: 1 days | End: 2023-07-21
Payer: SELF-PAY

## 2023-07-21 ENCOUNTER — APPOINTMENT (OUTPATIENT)
Dept: ULTRASOUND IMAGING | Facility: IMAGING CENTER | Age: 48
End: 2023-07-21
Payer: SELF-PAY

## 2023-07-21 DIAGNOSIS — R93.89 ABNORMAL FINDINGS ON DIAGNOSTIC IMAGING OF OTHER SPECIFIED BODY STRUCTURES: ICD-10-CM

## 2023-07-21 DIAGNOSIS — Z98.890 OTHER SPECIFIED POSTPROCEDURAL STATES: Chronic | ICD-10-CM

## 2023-07-21 PROCEDURE — 76856 US EXAM PELVIC COMPLETE: CPT | Mod: 26

## 2023-07-21 PROCEDURE — 76830 TRANSVAGINAL US NON-OB: CPT

## 2023-07-21 PROCEDURE — 76856 US EXAM PELVIC COMPLETE: CPT

## 2023-07-21 PROCEDURE — 76830 TRANSVAGINAL US NON-OB: CPT | Mod: 26

## 2023-07-25 ENCOUNTER — APPOINTMENT (OUTPATIENT)
Dept: SURGICAL ONCOLOGY | Facility: CLINIC | Age: 48
End: 2023-07-25

## 2023-07-31 ENCOUNTER — OUTPATIENT (OUTPATIENT)
Dept: OUTPATIENT SERVICES | Facility: HOSPITAL | Age: 48
LOS: 1 days | End: 2023-07-31
Payer: SELF-PAY

## 2023-07-31 VITALS
SYSTOLIC BLOOD PRESSURE: 123 MMHG | TEMPERATURE: 99 F | HEIGHT: 60 IN | DIASTOLIC BLOOD PRESSURE: 77 MMHG | HEART RATE: 69 BPM | OXYGEN SATURATION: 99 % | WEIGHT: 104.06 LBS | RESPIRATION RATE: 16 BRPM

## 2023-07-31 DIAGNOSIS — Z98.890 OTHER SPECIFIED POSTPROCEDURAL STATES: Chronic | ICD-10-CM

## 2023-07-31 DIAGNOSIS — R92.8 OTHER ABNORMAL AND INCONCLUSIVE FINDINGS ON DIAGNOSTIC IMAGING OF BREAST: ICD-10-CM

## 2023-07-31 DIAGNOSIS — Z01.818 ENCOUNTER FOR OTHER PREPROCEDURAL EXAMINATION: ICD-10-CM

## 2023-07-31 LAB
HCT VFR BLD CALC: 39.4 % — SIGNIFICANT CHANGE UP (ref 34.5–45)
HGB BLD-MCNC: 13 G/DL — SIGNIFICANT CHANGE UP (ref 11.5–15.5)
MCHC RBC-ENTMCNC: 30.2 PG — SIGNIFICANT CHANGE UP (ref 27–34)
MCHC RBC-ENTMCNC: 33 GM/DL — SIGNIFICANT CHANGE UP (ref 32–36)
MCV RBC AUTO: 91.4 FL — SIGNIFICANT CHANGE UP (ref 80–100)
NRBC # BLD: 0 /100 WBCS — SIGNIFICANT CHANGE UP (ref 0–0)
PLATELET # BLD AUTO: 279 K/UL — SIGNIFICANT CHANGE UP (ref 150–400)
RBC # BLD: 4.31 M/UL — SIGNIFICANT CHANGE UP (ref 3.8–5.2)
RBC # FLD: 12.5 % — SIGNIFICANT CHANGE UP (ref 10.3–14.5)
WBC # BLD: 6.13 K/UL — SIGNIFICANT CHANGE UP (ref 3.8–10.5)
WBC # FLD AUTO: 6.13 K/UL — SIGNIFICANT CHANGE UP (ref 3.8–10.5)

## 2023-07-31 PROCEDURE — 36415 COLL VENOUS BLD VENIPUNCTURE: CPT

## 2023-07-31 PROCEDURE — 85027 COMPLETE CBC AUTOMATED: CPT

## 2023-07-31 PROCEDURE — G0463: CPT

## 2023-07-31 NOTE — H&P PST ADULT - HISTORY OF PRESENT ILLNESS
This is a 47 year F who presents presents to [facility] for pre-operative diagnosis of abnormal findings on left breast biopsy.  Lesion noted in left breast on routine imaging.  Biopsy returned with non-proliferative and focal proliferative fibrocystic change w/ cyst formation, columnar cell change and stromal fibrosis.  Pt denies any breast pain, palpable mass or nipple discharge bilaterally.  Otherwise patient feels well today and denies any acute symptoms. ?  ?  ?    ?

## 2023-07-31 NOTE — H&P PST ADULT - NSICDXPASTMEDICALHX_GEN_ALL_CORE_FT
PAST MEDICAL HISTORY:  Complex ovarian cyst     Fibromyalgia     GERD (gastroesophageal reflux disease)     H pylori ulcer treated    HPV (human papilloma virus) infection     Insomnia     Other abnormal and inconclusive findings on diagnostic imaging of breast

## 2023-07-31 NOTE — H&P PST ADULT - NSHP PST SURGERY DATE_DT_GEN_A_CORE
LVM to reschedule patients appointment for the 19th due to Dr. Mandie Reese being out of the office. 07-Aug-2023

## 2023-08-02 ENCOUNTER — OUTPATIENT (OUTPATIENT)
Dept: OUTPATIENT SERVICES | Facility: HOSPITAL | Age: 48
LOS: 1 days | End: 2023-08-02
Payer: SELF-PAY

## 2023-08-02 ENCOUNTER — APPOINTMENT (OUTPATIENT)
Dept: MAMMOGRAPHY | Facility: IMAGING CENTER | Age: 48
End: 2023-08-02
Payer: SELF-PAY

## 2023-08-02 DIAGNOSIS — Z98.890 OTHER SPECIFIED POSTPROCEDURAL STATES: Chronic | ICD-10-CM

## 2023-08-02 DIAGNOSIS — Z00.8 ENCOUNTER FOR OTHER GENERAL EXAMINATION: ICD-10-CM

## 2023-08-02 PROBLEM — R92.8 OTHER ABNORMAL AND INCONCLUSIVE FINDINGS ON DIAGNOSTIC IMAGING OF BREAST: Chronic | Status: ACTIVE | Noted: 2023-07-31

## 2023-08-02 PROCEDURE — 19281 PERQ DEVICE BREAST 1ST IMAG: CPT | Mod: LT

## 2023-08-02 PROCEDURE — 19281 PERQ DEVICE BREAST 1ST IMAG: CPT

## 2023-08-06 ENCOUNTER — TRANSCRIPTION ENCOUNTER (OUTPATIENT)
Age: 48
End: 2023-08-06

## 2023-08-07 ENCOUNTER — RESULT REVIEW (OUTPATIENT)
Age: 48
End: 2023-08-07

## 2023-08-07 ENCOUNTER — TRANSCRIPTION ENCOUNTER (OUTPATIENT)
Age: 48
End: 2023-08-07

## 2023-08-07 ENCOUNTER — OUTPATIENT (OUTPATIENT)
Dept: OUTPATIENT SERVICES | Facility: HOSPITAL | Age: 48
LOS: 1 days | End: 2023-08-07
Payer: SELF-PAY

## 2023-08-07 ENCOUNTER — APPOINTMENT (OUTPATIENT)
Dept: SURGICAL ONCOLOGY | Facility: HOSPITAL | Age: 48
End: 2023-08-07

## 2023-08-07 VITALS
DIASTOLIC BLOOD PRESSURE: 73 MMHG | OXYGEN SATURATION: 99 % | TEMPERATURE: 98 F | RESPIRATION RATE: 16 BRPM | HEART RATE: 74 BPM | SYSTOLIC BLOOD PRESSURE: 120 MMHG

## 2023-08-07 VITALS
OXYGEN SATURATION: 98 % | HEIGHT: 60 IN | WEIGHT: 104.06 LBS | HEART RATE: 61 BPM | SYSTOLIC BLOOD PRESSURE: 127 MMHG | RESPIRATION RATE: 14 BRPM | TEMPERATURE: 99 F | DIASTOLIC BLOOD PRESSURE: 78 MMHG

## 2023-08-07 DIAGNOSIS — Z98.890 OTHER SPECIFIED POSTPROCEDURAL STATES: Chronic | ICD-10-CM

## 2023-08-07 DIAGNOSIS — R92.8 OTHER ABNORMAL AND INCONCLUSIVE FINDINGS ON DIAGNOSTIC IMAGING OF BREAST: ICD-10-CM

## 2023-08-07 PROCEDURE — 76098 X-RAY EXAM SURGICAL SPECIMEN: CPT

## 2023-08-07 PROCEDURE — 88307 TISSUE EXAM BY PATHOLOGIST: CPT | Mod: 26

## 2023-08-07 PROCEDURE — 14001 TIS TRNFR TRUNK 10.1-30SQCM: CPT

## 2023-08-07 PROCEDURE — 19301 PARTIAL MASTECTOMY: CPT

## 2023-08-07 PROCEDURE — 88307 TISSUE EXAM BY PATHOLOGIST: CPT

## 2023-08-07 PROCEDURE — 76098 X-RAY EXAM SURGICAL SPECIMEN: CPT | Mod: 26

## 2023-08-07 RX ORDER — LANOLIN ALCOHOL/MO/W.PET/CERES
1 CREAM (GRAM) TOPICAL
Qty: 0 | Refills: 0 | DISCHARGE

## 2023-08-07 RX ORDER — SODIUM CHLORIDE 9 MG/ML
1000 INJECTION, SOLUTION INTRAVENOUS
Refills: 0 | Status: DISCONTINUED | OUTPATIENT
Start: 2023-08-07 | End: 2023-08-07

## 2023-08-07 RX ORDER — ONDANSETRON 8 MG/1
4 TABLET, FILM COATED ORAL ONCE
Refills: 0 | Status: DISCONTINUED | OUTPATIENT
Start: 2023-08-07 | End: 2023-08-07

## 2023-08-07 RX ORDER — FAMOTIDINE 10 MG/ML
1 INJECTION INTRAVENOUS
Qty: 0 | Refills: 0 | DISCHARGE

## 2023-08-07 RX ORDER — HYDROMORPHONE HYDROCHLORIDE 2 MG/ML
0.5 INJECTION INTRAMUSCULAR; INTRAVENOUS; SUBCUTANEOUS
Refills: 0 | Status: DISCONTINUED | OUTPATIENT
Start: 2023-08-07 | End: 2023-08-07

## 2023-08-07 RX ORDER — OXYCODONE AND ACETAMINOPHEN 5; 325 MG/1; MG/1
1 TABLET ORAL ONCE
Refills: 0 | Status: DISCONTINUED | OUTPATIENT
Start: 2023-08-07 | End: 2023-08-07

## 2023-08-07 RX ORDER — MULTIVIT WITH MIN/MFOLATE/K2 340-15/3 G
300 POWDER (GRAM) ORAL
Qty: 0 | Refills: 0 | DISCHARGE

## 2023-08-07 RX ADMIN — SODIUM CHLORIDE 50 MILLILITER(S): 9 INJECTION, SOLUTION INTRAVENOUS at 12:36

## 2023-08-07 RX ADMIN — HYDROMORPHONE HYDROCHLORIDE 0.5 MILLIGRAM(S): 2 INJECTION INTRAMUSCULAR; INTRAVENOUS; SUBCUTANEOUS at 18:14

## 2023-08-07 RX ADMIN — HYDROMORPHONE HYDROCHLORIDE 0.5 MILLIGRAM(S): 2 INJECTION INTRAMUSCULAR; INTRAVENOUS; SUBCUTANEOUS at 17:58

## 2023-08-07 NOTE — ASU PREOP CHECKLIST - PATIENT PROBLEMS/NEEDS
Advance Care Planning     Advance Care Planning Activator (Inpatient)  Conversation Note      Date of ACP Conversation: 4/18/2023     Conversation Conducted with: Patient with Decision Making Capacity    ACP Activator: Jina Roberts RN        Health Care Decision Maker:     Current Designated Health Care Decision Maker:     Primary Decision Maker: Josie Scherer - Child - [phone]  Click here to complete Healthcare Decision Makers including section of the Healthcare Decision Maker Relationship (ie \"Primary\")  Today we documented Decision Maker(s) consistent with Legal Next of Kin hierarchy. Care Preferences    Ventilation: \"If you were in your present state of health and suddenly became very ill and were unable to breathe on your own, what would your preference be about the use of a ventilator (breathing machine) if it were available to you? \"      Would the patient desire the use of ventilator (breathing machine)?: no    \"If your health worsens and it becomes clear that your chance of recovery is unlikely, what would your preference be about the use of a ventilator (breathing machine) if it were available to you? \"     Would the patient desire the use of ventilator (breathing machine)?: No      Resuscitation  \"CPR works best to restart the heart when there is a sudden event, like a heart attack, in someone who is otherwise healthy. Unfortunately, CPR does not typically restart the heart for people who have serious health conditions or who are very sick. \"    \"In the event your heart stopped as a result of an underlying serious health condition, would you want attempts to be made to restart your heart (answer \"yes\" for attempt to resuscitate) or would you prefer a natural death (answer \"no\" for do not attempt to resuscitate)? \" no       [x] Yes   [] No   Educated Patient / Sunday Huerta regarding differences between Advance Directives and portable DNR orders.     Length of ACP Conversation in minutes: Patient expressed no known problems or needs

## 2023-08-07 NOTE — BRIEF OPERATIVE NOTE - NSICDXBRIEFPOSTOP_GEN_ALL_CORE_FT
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

## 2023-08-07 NOTE — BRIEF OPERATIVE NOTE - NSICDXBRIEFPREOP_GEN_ALL_CORE_FT
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

## 2023-08-07 NOTE — BRIEF OPERATIVE NOTE - NSICDXBRIEFPROCEDURE_GEN_ALL_CORE_FT
PROCEDURES:  Partial mastectomy of left breast 07-Aug-2023 17:08:59 Seble localized Magi Landaverde  Adjacent tissue transfer for complicated defect 07-Aug-2023 17:11:07 20 cm2 Magi Landaverde

## 2023-08-07 NOTE — ASU DISCHARGE PLAN (ADULT/PEDIATRIC) - CARE PROVIDER_API CALL
Reyes, Sylvia Alicia  Surgical Oncology  [address] [place] [address], [place] [address]  Phone: [phone]  Fax: [phone]  Established Patient  Follow Up Time: 2 weeks

## 2023-08-07 NOTE — ASU PATIENT PROFILE, ADULT - FALL HARM RISK - UNIVERSAL INTERVENTIONS
Bed in lowest position, wheels locked, appropriate side rails in place/Call bell, personal items and telephone in reach/Instruct patient to call for assistance before getting out of bed or chair/Non-slip footwear when patient is out of bed/[place] to call system/Physically safe environment - no spills, clutter or unnecessary equipment/Purposeful Proactive Rounding/Room/bathroom lighting operational, light cord in reach

## 2023-08-07 NOTE — ASU DISCHARGE PLAN (ADULT/PEDIATRIC) - NURSING INSTRUCTIONS
All discharge information reviewed with patient including pain, safety, medication and follow up care . Pt acknowledges understanding of discharge instructions.  Dr Reyes office to email preprinted instructions tomorrow

## 2023-08-14 LAB — SURGICAL PATHOLOGY STUDY: SIGNIFICANT CHANGE UP

## 2023-08-16 NOTE — HISTORY OF PRESENT ILLNESS
[FreeTextEntry8] : This is a 42 y/o F with a PMH significant for breast cysts and fibromyalgia who presents today for an acute visit for follow up regarding headaches. \par \par She was seen last week for a CPE by Dr. Lan. She called her on Friday to discuss concerns regarding headaches so she came in today for a follow up visit. She states that she first started having headaches in January when she was having her tooth pain. She was taking high doses of motrin at that time but then stopped taking motrin and her headaches also resolved. However, they started to worsen again in the last month after she had her tooth abscess drained. She was prescribed high dose ibuprofen and meloxicam for the pain that she was taking daily and discontinued last week. She states the headaches sometimes are severe enough to cause nausea and vomiting. Denies photophobia or phonophobia. They don't wake her at night. She says she also has pain in her R ear intermittently. She is worried because her cousin was diagnosed with a brain tumor at age 37 and complained of headaches. \par \par Of note, patient had been complaining of headaches in 2015 and had an MRI which was negative. \par \par 
(4) rarely moist

## 2023-08-22 ENCOUNTER — APPOINTMENT (OUTPATIENT)
Dept: SURGICAL ONCOLOGY | Facility: CLINIC | Age: 48
End: 2023-08-22
Payer: COMMERCIAL

## 2023-08-22 VITALS
RESPIRATION RATE: 16 BRPM | HEART RATE: 80 BPM | HEIGHT: 59 IN | OXYGEN SATURATION: 99 % | WEIGHT: 104 LBS | SYSTOLIC BLOOD PRESSURE: 117 MMHG | DIASTOLIC BLOOD PRESSURE: 79 MMHG | BODY MASS INDEX: 20.96 KG/M2

## 2023-08-22 PROCEDURE — 99024 POSTOP FOLLOW-UP VISIT: CPT

## 2023-08-22 NOTE — REASON FOR VISIT
[de-identified] : Left breast lisbeth localized partial mastectomy, tissue transfer 20cm2 [de-identified] : 8/7/2023 [de-identified] : 17

## 2023-08-22 NOTE — PHYSICAL EXAM
[Normal] : oriented to person, place and time, with appropriate affect [de-identified] : Left breast surgical incision is c/d/i, skin sloughing noted on infra-periareolar incision, no hematoma/seroma, no evidence of infection, no parenchymal defect [de-identified] : Normal respiratory effort

## 2023-08-22 NOTE — ASSESSMENT
[FreeTextEntry1] : ANEL DEL REAL is a 47-year F s/p USGBx of Left 6N4 0.8 irregular hypoechoic mass w/ benign findings found to be discordant. She is now s/p Left lisbeth loc PM and tissue transfer on 8/7/2023.   She is recovering well from surgery.  The incision shows some skin-sloughing at the infra periareolar incision.  mild debridement performed and re-approximated with dermabond.   We discussed her surgical pathology which showed left breast radial scar with usual duct hyperplasia, fibrosis and calcifications. Fibroproliferative including sclerosing adenosis.   All benign results.    Next imaging:  B/l SC MG/US due June 2024  RTO in 2 weeks for wound check. She knows to return sooner if any breast abnormalities or if any breast issues/concerns arise.

## 2023-08-22 NOTE — HISTORY OF PRESENT ILLNESS
[de-identified] : ANEL DEL REAL is a 47-year F s/p USGBx of Left 6N4 0.8 irregular hypoechoic mass w/ benign findings found to be discordant. She is now s/p Left lisbeth loc PM and tissue transfer 20cm2 on 2023. Here for post operative visit.   23 Patient underwent USGBx of Left 6N4 0.8 cm irregular mass with findings of non- proliferative and focal proliferative fibrocystic change w/ cyst formation, columnar cell change and stromal fibrosis. scant microcalcs present - found to be radiologically discordant.   Undergoing HR screening, prior hx of b/l cyst aspiration in 2019. Denies any skin changes or nipple discharge, states it hard to tell if Shes felt a palpable mass as she has fibrocystic breasts.   23 Patient is POD # 15. Denies any fevers or chills. Reports some irritation and intermittent pain over surgical incision. After removing steri-strips there is some noted infra areolar skin slothing.    PMHx: Depression,  PSHx: s/p Left ovary and fallopian tube 2/2 ovarian cyst 2020,  Meds: duloxetine 30 mg, famotidine 20 mg, Melatonin, Fish oil, magnesium, collagen,  NKDA Family Hx: Bone cancer ( paternal aunt), Question of cervical cancer ( Paternal grandmother) ??, Throat cancer (maternal uncle), skin cancer ( mother) ? GYN: , menarche age 17, LMP  23 Bra size: 34 A Social: Smoking:Denies       ETOH:Denies

## 2023-08-22 NOTE — RESULTS/DATA
[FreeTextEntry1] : BREAST PATH/RAD REVIEW  [facility]:  2/19/2020 Breast MRI: BIRADs 2. B/L cysts ( R up to 2.7, L up to 3.3 cm) 8/4/2020 B/L US: BIRADs 2, B/L cysts ( L up to 3.7 cm, R up to 3.4 cm)  3/2/201 B/L SC MG/US (TC 8.7%) :BIRADs 2. Extremely dense. R 12:00 & 4:00 at palp AOC c/w multiple simple & complicated cysts up to 3.1 cm, L multiple cysts including cyst cont calcs measuring 5 mm. 5/27/2022 B/L SC MG/US (TC 55.3%) : BIRADs 0.Multiple B/L cysts, L 12:00-RA cyst w/ possible intraductal mass ( Rec L Dx US) 6/6/2022 L DX US: BIRADs 4B. L 12:00-RA 0.5 cm intracystic mass within a 1.4 cm cyst (Rec USGBx)   6/14/22 Left USGBx ~ Left 12:00 Retroareolar core bx: Breast w/ stromal fibrosis, apocrine metaplasia and focal accumulation of histiocytes. Benign and concordant. Heart clip.( Rec f/u US in 1 yr at time of annual MG)   6/16/2023 B/L SC MG/ B/L Dx MG (TC 55.6%): BIRADs 0. L UOQ and central outer subtle areas of architectural distortion (Rec L Dx MG/US). B/l scattered simple/complex cysts. 6/23/2023 L Dx MG/US: BIRADs 4C. Suspicious L 6N4 0.8 cm irregular hypoechoic mildly shadowing mass ( Rec USGBx and given pts hx may meet criteria for annual SC MRI)  6/30/2023 L USGBx ~ Left 6N4 core bx: Non- proliferative and focal proliferative fibrocystic change w/ cyst formation, columnar cell change and stromal fibrosis. scant microcalcs present. DISCORDANT. Ribbon Clip (rec Surgical c/s)   7/17/2023 Breast MRI: BIRADs 2. - L lower central bx marker associated w/ 1.2 cm enhancement at the site of recent benign but discordant bx (Rec continued surg c/s) - No suspicious enhancement elsewhere in either breast. No axillary or IM lymphadenopathy  - Incidental 6 mm T2 hyperintense Non enhancing hepatic cyst, newly seen  - b/l scattered cysts    8/7/23 s/p L PM, Tissue transfer at [facility]  Path: Left 6:00 mass: Radial scar with UDH, fibrosis and calcs. fibroproliferative including sclerosing adenosis, columnar cell change and cysts. bx site reaction.

## 2023-09-12 ENCOUNTER — APPOINTMENT (OUTPATIENT)
Dept: SURGICAL ONCOLOGY | Facility: CLINIC | Age: 48
End: 2023-09-12
Payer: COMMERCIAL

## 2023-09-12 PROCEDURE — 99024 POSTOP FOLLOW-UP VISIT: CPT

## 2023-09-14 ENCOUNTER — APPOINTMENT (OUTPATIENT)
Dept: OBGYN | Facility: CLINIC | Age: 48
End: 2023-09-14
Payer: COMMERCIAL

## 2023-09-14 ENCOUNTER — OUTPATIENT (OUTPATIENT)
Dept: OUTPATIENT SERVICES | Facility: HOSPITAL | Age: 48
LOS: 1 days | End: 2023-09-14
Payer: SELF-PAY

## 2023-09-14 VITALS — SYSTOLIC BLOOD PRESSURE: 114 MMHG | DIASTOLIC BLOOD PRESSURE: 74 MMHG | WEIGHT: 105 LBS | BODY MASS INDEX: 21.21 KG/M2

## 2023-09-14 DIAGNOSIS — Z98.890 OTHER SPECIFIED POSTPROCEDURAL STATES: Chronic | ICD-10-CM

## 2023-09-14 DIAGNOSIS — N76.0 ACUTE VAGINITIS: ICD-10-CM

## 2023-09-14 DIAGNOSIS — N84.0 POLYP OF CORPUS UTERI: ICD-10-CM

## 2023-09-14 PROCEDURE — G0463: CPT

## 2023-09-14 PROCEDURE — 99214 OFFICE O/P EST MOD 30 MIN: CPT | Mod: GC

## 2023-09-14 RX ORDER — MISOPROSTOL 200 UG/1
200 TABLET ORAL
Qty: 2 | Refills: 0 | Status: ACTIVE | COMMUNITY
Start: 2023-09-14 | End: 1900-01-01

## 2023-09-27 DIAGNOSIS — N84.0 POLYP OF CORPUS UTERI: ICD-10-CM

## 2023-10-12 ENCOUNTER — APPOINTMENT (OUTPATIENT)
Dept: DERMATOLOGY | Facility: HOSPITAL | Age: 48
End: 2023-10-12

## 2023-10-12 NOTE — ED ADULT TRIAGE NOTE - AS TEMP SITE
10/12/23 1142   Service Assessment   Patient Orientation Alert and Oriented   Cognition Alert   History Provided By Medical Record   Primary Caregiver Family   Accompanied By/Relationship    Support Systems Spouse/Significant Other   Patient's Healthcare Decision Maker is: Named in Brittny Gill   PCP Verified by CM Yes   Last Visit to PCP Within last 3 months   Prior Functional Level Assistance with the following:;Mobility   Current Functional Level Assistance with the following:;Mobility   Can patient return to prior living arrangement Yes   Ability to make needs known: Fair   Family able to assist with home care needs: Yes   Would you like for me to discuss the discharge plan with any other family members/significant others, and if so, who? Yes   Financial Resources Domain Surgical Resources None   Social/Functional History   Lives With Spouse   Type of [id number] centrose Cane;Walker, rolling   Receives Help From Family     Lives at address on file with . Has DME cane and walker but does not use usually. Has had home health in past.     Current dc plan home with family pending hospital course.      Readmission Assessment  Number of Days since last admission?: 8-30 days  Previous Disposition: Home with Family  Who is being Interviewed: Patient  What was the patient's/caregiver's perception as to why they think they needed to return back to the hospital?: Other (Comment) (pain with N/V at home)  Did you visit your Primary Care Physician after you left the hospital, before you returned this time?: Yes  Did you see a specialist, such as Cardiac, Pulmonary, Orthopedic Physician, etc. after you left the hospital?: No  Who advised the patient to return to the hospital?: Self-referral  Does the patient report anything that got in the way of taking their medications?: No  In our efforts to provide the best possible care to you and others like you, can you think of anything that we oral

## 2023-11-13 ENCOUNTER — OUTPATIENT (OUTPATIENT)
Dept: OUTPATIENT SERVICES | Facility: HOSPITAL | Age: 48
LOS: 1 days | End: 2023-11-13
Payer: SELF-PAY

## 2023-11-13 VITALS
OXYGEN SATURATION: 100 % | HEART RATE: 65 BPM | RESPIRATION RATE: 17 BRPM | DIASTOLIC BLOOD PRESSURE: 75 MMHG | SYSTOLIC BLOOD PRESSURE: 125 MMHG | WEIGHT: 102.07 LBS | HEIGHT: 59 IN | TEMPERATURE: 99 F

## 2023-11-13 DIAGNOSIS — Z98.890 OTHER SPECIFIED POSTPROCEDURAL STATES: Chronic | ICD-10-CM

## 2023-11-13 DIAGNOSIS — N84.0 POLYP OF CORPUS UTERI: ICD-10-CM

## 2023-11-13 DIAGNOSIS — Z01.818 ENCOUNTER FOR OTHER PREPROCEDURAL EXAMINATION: ICD-10-CM

## 2023-11-13 LAB
HCT VFR BLD CALC: 36.7 % — SIGNIFICANT CHANGE UP (ref 34.5–45)
HCT VFR BLD CALC: 36.7 % — SIGNIFICANT CHANGE UP (ref 34.5–45)
HGB BLD-MCNC: 12.5 G/DL — SIGNIFICANT CHANGE UP (ref 11.5–15.5)
HGB BLD-MCNC: 12.5 G/DL — SIGNIFICANT CHANGE UP (ref 11.5–15.5)
MCHC RBC-ENTMCNC: 30.9 PG — SIGNIFICANT CHANGE UP (ref 27–34)
MCHC RBC-ENTMCNC: 30.9 PG — SIGNIFICANT CHANGE UP (ref 27–34)
MCHC RBC-ENTMCNC: 34.1 GM/DL — SIGNIFICANT CHANGE UP (ref 32–36)
MCHC RBC-ENTMCNC: 34.1 GM/DL — SIGNIFICANT CHANGE UP (ref 32–36)
MCV RBC AUTO: 90.8 FL — SIGNIFICANT CHANGE UP (ref 80–100)
MCV RBC AUTO: 90.8 FL — SIGNIFICANT CHANGE UP (ref 80–100)
NRBC # BLD: 0 /100 WBCS — SIGNIFICANT CHANGE UP (ref 0–0)
NRBC # BLD: 0 /100 WBCS — SIGNIFICANT CHANGE UP (ref 0–0)
PLATELET # BLD AUTO: 276 K/UL — SIGNIFICANT CHANGE UP (ref 150–400)
PLATELET # BLD AUTO: 276 K/UL — SIGNIFICANT CHANGE UP (ref 150–400)
RBC # BLD: 4.04 M/UL — SIGNIFICANT CHANGE UP (ref 3.8–5.2)
RBC # BLD: 4.04 M/UL — SIGNIFICANT CHANGE UP (ref 3.8–5.2)
RBC # FLD: 12.4 % — SIGNIFICANT CHANGE UP (ref 10.3–14.5)
RBC # FLD: 12.4 % — SIGNIFICANT CHANGE UP (ref 10.3–14.5)
WBC # BLD: 8.17 K/UL — SIGNIFICANT CHANGE UP (ref 3.8–10.5)
WBC # BLD: 8.17 K/UL — SIGNIFICANT CHANGE UP (ref 3.8–10.5)
WBC # FLD AUTO: 8.17 K/UL — SIGNIFICANT CHANGE UP (ref 3.8–10.5)
WBC # FLD AUTO: 8.17 K/UL — SIGNIFICANT CHANGE UP (ref 3.8–10.5)

## 2023-11-13 PROCEDURE — 86850 RBC ANTIBODY SCREEN: CPT

## 2023-11-13 PROCEDURE — G0463: CPT

## 2023-11-13 PROCEDURE — 86900 BLOOD TYPING SEROLOGIC ABO: CPT

## 2023-11-13 PROCEDURE — 86901 BLOOD TYPING SEROLOGIC RH(D): CPT

## 2023-11-13 RX ORDER — MULTIVIT WITH MIN/MFOLATE/K2 340-15/3 G
1 POWDER (GRAM) ORAL
Refills: 0 | DISCHARGE

## 2023-11-13 RX ORDER — SODIUM CHLORIDE 9 MG/ML
1000 INJECTION, SOLUTION INTRAVENOUS
Refills: 0 | Status: DISCONTINUED | OUTPATIENT
Start: 2023-12-07 | End: 2023-12-21

## 2023-11-13 RX ORDER — SODIUM CHLORIDE 9 MG/ML
3 INJECTION INTRAMUSCULAR; INTRAVENOUS; SUBCUTANEOUS EVERY 8 HOURS
Refills: 0 | Status: DISCONTINUED | OUTPATIENT
Start: 2023-12-07 | End: 2023-12-21

## 2023-11-13 NOTE — H&P PST ADULT - NSICDXPASTSURGICALHX_GEN_ALL_CORE_FT
PAST SURGICAL HISTORY:  H/O breast surgery     H/O oral surgery     History of salpingostomy     S/P endoscopy 9/2020

## 2023-11-13 NOTE — H&P PST ADULT - HISTORY OF PRESENT ILLNESS
47 yo G0 female LMP 10/28 h/o thickened endometrium noted on US, uterine polyps, presents to [facility] for D&C, OPERATIVE HYSTEROSCOPY, POLYPECTOMY W/AVETA 12/7/2023. Denies any palpitations, SOB, N/V, fever or chills.      47 yo G0 female LMP 10/28 h/o thickened endometrium noted on US, uterine polyps, presents to [facility] for D&C, OPERATIVE HYSTEROSCOPY, POLYPECTOMY W/AVETA 12/7/2023. Denies any palpitations, SOB, N/V, fever or chills.      49 yo G0 female LMP 10/28 h/o thickened endometrium noted on US, uterine polyps, presents to [facility] for D&C, OPERATIVE HYSTEROSCOPY, POLYPECTOMY W/AVETA 12/7/2023. Denies any palpitations, SOB, N/V, fever or chills.

## 2023-11-13 NOTE — H&P PST ADULT - HISTORY OF COVID-19 VACCINATION
RN in to check to see how pumping has been going. Mom was pumping when RN came into the room.  Mom notes she has been pumping every 2-3 hours.  Mom did get 37 ml the second time pumping and has recently been getting drops or smaller amounts.  Mom is aware that this normal. RN noted that the breast shields are the correct fit.  Mom was initially pumping the Maintain Phase and RN changed it to the Initiate Phase and explained why she needs to pump on that phase.  Parents verbalixed understanding and how to set the pump up to that phase.   Mom denies any further questions.  Support given.  Enc to call with any needs.      Time spent with pt: 10 min    JUANA Alvarez, RN, CLC     Yes

## 2023-11-13 NOTE — H&P PST ADULT - PROBLEM SELECTOR PLAN 1
D&C  OPERATIVE HYSTEROSCOPY  POLYPECTOMY W/AVETA  Pre-op education provided - all questions answered   CBC and T&S sent in PST

## 2023-11-13 NOTE — H&P PST ADULT - ATTENDING COMMENTS
Attending Note    pt seen at bedside, DAMION-P plan for dx hysteroscopy d&c, discussed risks of procedure including but not limited to VTE, SSI, damage to bowel, bladder ureter, uterine perforation, need for laparoscopy, laparotomy. Understands learners are part of her case and performing EUA. All questions answered    Rossy Orta MD

## 2023-11-14 ENCOUNTER — APPOINTMENT (OUTPATIENT)
Dept: INTERNAL MEDICINE | Facility: CLINIC | Age: 48
End: 2023-11-14

## 2023-11-14 RX ORDER — DULOXETINE HYDROCHLORIDE 30 MG/1
30 CAPSULE, DELAYED RELEASE PELLETS ORAL
Qty: 60 | Refills: 3 | Status: ACTIVE | COMMUNITY
Start: 2023-04-25 | End: 1900-01-01

## 2023-11-20 ENCOUNTER — OUTPATIENT (OUTPATIENT)
Dept: OUTPATIENT SERVICES | Facility: HOSPITAL | Age: 48
LOS: 1 days | End: 2023-11-20
Payer: SELF-PAY

## 2023-11-20 ENCOUNTER — APPOINTMENT (OUTPATIENT)
Dept: INTERNAL MEDICINE | Facility: CLINIC | Age: 48
End: 2023-11-20
Payer: COMMERCIAL

## 2023-11-20 VITALS
SYSTOLIC BLOOD PRESSURE: 120 MMHG | DIASTOLIC BLOOD PRESSURE: 70 MMHG | BODY MASS INDEX: 20.16 KG/M2 | WEIGHT: 100 LBS | OXYGEN SATURATION: 99 % | HEART RATE: 85 BPM | HEIGHT: 59 IN

## 2023-11-20 DIAGNOSIS — Z98.890 OTHER SPECIFIED POSTPROCEDURAL STATES: Chronic | ICD-10-CM

## 2023-11-20 DIAGNOSIS — I10 ESSENTIAL (PRIMARY) HYPERTENSION: ICD-10-CM

## 2023-11-20 DIAGNOSIS — Z01.818 ENCOUNTER FOR OTHER PREPROCEDURAL EXAMINATION: ICD-10-CM

## 2023-11-20 PROCEDURE — 99213 OFFICE O/P EST LOW 20 MIN: CPT | Mod: GE

## 2023-11-20 PROCEDURE — G0463: CPT

## 2023-11-27 DIAGNOSIS — Z01.818 ENCOUNTER FOR OTHER PREPROCEDURAL EXAMINATION: ICD-10-CM

## 2023-12-06 ENCOUNTER — TRANSCRIPTION ENCOUNTER (OUTPATIENT)
Age: 48
End: 2023-12-06

## 2023-12-07 ENCOUNTER — TRANSCRIPTION ENCOUNTER (OUTPATIENT)
Age: 48
End: 2023-12-07

## 2023-12-07 ENCOUNTER — RESULT REVIEW (OUTPATIENT)
Age: 48
End: 2023-12-07

## 2023-12-07 ENCOUNTER — OUTPATIENT (OUTPATIENT)
Dept: OUTPATIENT SERVICES | Facility: HOSPITAL | Age: 48
LOS: 1 days | End: 2023-12-07
Payer: SELF-PAY

## 2023-12-07 ENCOUNTER — APPOINTMENT (OUTPATIENT)
Dept: OBGYN | Facility: HOSPITAL | Age: 48
End: 2023-12-07

## 2023-12-07 VITALS
SYSTOLIC BLOOD PRESSURE: 99 MMHG | HEIGHT: 59 IN | HEART RATE: 99 BPM | TEMPERATURE: 97 F | DIASTOLIC BLOOD PRESSURE: 66 MMHG | OXYGEN SATURATION: 100 % | WEIGHT: 102.07 LBS | RESPIRATION RATE: 16 BRPM

## 2023-12-07 VITALS
SYSTOLIC BLOOD PRESSURE: 119 MMHG | RESPIRATION RATE: 16 BRPM | HEART RATE: 65 BPM | DIASTOLIC BLOOD PRESSURE: 54 MMHG | OXYGEN SATURATION: 100 %

## 2023-12-07 DIAGNOSIS — Z98.890 OTHER SPECIFIED POSTPROCEDURAL STATES: Chronic | ICD-10-CM

## 2023-12-07 DIAGNOSIS — R93.89 ABNORMAL FINDINGS ON DIAGNOSTIC IMAGING OF OTHER SPECIFIED BODY STRUCTURES: ICD-10-CM

## 2023-12-07 DIAGNOSIS — N84.0 POLYP OF CORPUS UTERI: ICD-10-CM

## 2023-12-07 PROCEDURE — 86900 BLOOD TYPING SEROLOGIC ABO: CPT

## 2023-12-07 PROCEDURE — C1782: CPT

## 2023-12-07 PROCEDURE — 88305 TISSUE EXAM BY PATHOLOGIST: CPT

## 2023-12-07 PROCEDURE — 88305 TISSUE EXAM BY PATHOLOGIST: CPT | Mod: 26

## 2023-12-07 PROCEDURE — 86850 RBC ANTIBODY SCREEN: CPT

## 2023-12-07 PROCEDURE — 58558 HYSTEROSCOPY BIOPSY: CPT

## 2023-12-07 PROCEDURE — 86901 BLOOD TYPING SEROLOGIC RH(D): CPT

## 2023-12-07 DEVICE — AVETA FLEX RESECTING DEVICE 2.9MM: Type: IMPLANTABLE DEVICE | Status: FUNCTIONAL

## 2023-12-07 RX ORDER — LIDOCAINE HCL 20 MG/ML
0.2 VIAL (ML) INJECTION ONCE
Refills: 0 | Status: COMPLETED | OUTPATIENT
Start: 2023-12-07 | End: 2023-12-07

## 2023-12-07 RX ORDER — BENZOCAINE AND MENTHOL 5; 1 G/100ML; G/100ML
1 LIQUID ORAL ONCE
Refills: 0 | Status: COMPLETED | OUTPATIENT
Start: 2023-12-07 | End: 2023-12-07

## 2023-12-07 RX ORDER — KETOROLAC TROMETHAMINE 30 MG/ML
15 SYRINGE (ML) INJECTION ONCE
Refills: 0 | Status: DISCONTINUED | OUTPATIENT
Start: 2023-12-07 | End: 2023-12-07

## 2023-12-07 RX ORDER — OXYCODONE HYDROCHLORIDE 5 MG/1
5 TABLET ORAL ONCE
Refills: 0 | Status: DISCONTINUED | OUTPATIENT
Start: 2023-12-07 | End: 2023-12-07

## 2023-12-07 RX ORDER — ONDANSETRON 8 MG/1
4 TABLET, FILM COATED ORAL ONCE
Refills: 0 | Status: DISCONTINUED | OUTPATIENT
Start: 2023-12-07 | End: 2023-12-21

## 2023-12-07 RX ADMIN — BENZOCAINE AND MENTHOL 1 LOZENGE: 5; 1 LIQUID ORAL at 10:53

## 2023-12-07 RX ADMIN — Medication 15 MILLIGRAM(S): at 10:54

## 2023-12-07 RX ADMIN — SODIUM CHLORIDE 3 MILLILITER(S): 9 INJECTION INTRAMUSCULAR; INTRAVENOUS; SUBCUTANEOUS at 09:06

## 2023-12-07 RX ADMIN — SODIUM CHLORIDE 100 MILLILITER(S): 9 INJECTION, SOLUTION INTRAVENOUS at 08:54

## 2023-12-07 NOTE — ASU DISCHARGE PLAN (ADULT/PEDIATRIC) - ASU DC SPECIAL INSTRUCTIONSFT
Discharge Instructions:  1. Diet: regular  2. Activity limited by:   - no running, heavy lifting, strenuous exercise until cleared by MD   - nothing in vagina (bath, swim, intercourse, douching, tampons) for 2 weeks and until cleared by MD   3. Medications:   - Ibuprofen 600mg every 6 hours as needed for pain, Acetaminophen 500mg every 6 hours as needed for pain  4. Follow-up appointment - 2 weeks. Please call the office upon discharge to schedule your appointment if you do not have one already.   5. Precautions:  - Call the office or go to the ED if you have any of the followin) Fever >100.4 that does not resolve  2) Intractable pain  3) Heavy bleeding Discharge Instructions  ******************************************************************************************   Diet: regular  ******************************************************************************************   Activity limited by:  no running, heavy lifting, strenuous exercise until cleared by MD. Nothing in vagina (bath, swim, intercourse, douching, tampons) for 2 weeks and until cleared by MD   ******************************************************************************************   Medications:  Ibuprofen 600mg every 6 hours as needed for pain, Acetaminophen 500mg every 6 hours as needed for pain  ******************************************************************************************   Follow-up appointment - 2 weeks. Please call the office upon discharge to schedule your appointment if you do not have one already.   ******************************************************************************************   Precautions: Call the office or go to the ED if you have any of the followin) Fever >100.4 that does not resolve 2) Intractable pain 3) Heavy bleeding

## 2023-12-07 NOTE — PRE-ANESTHESIA EVALUATION ADULT - NSANTHPMHFT_GEN_ALL_CORE
47 yo G0 female LMP 10/28 h/o thickened endometrium noted on US, uterine polyps, presents to [facility] for D&C, OPERATIVE HYSTEROSCOPY, POLYPECTOMY W/AVETA 12/7/2023. Denies any palpitations, SOB, N/V, fever or chills.    Aug 2023 left  Breast lumpectomy 47 yo G0 female LMP 10/28 h/o thickened endometrium noted on US, uterine polyps, presents to [facility] for D&C, OPERATIVE HYSTEROSCOPY, POLYPECTOMY W/AVETA 12/7/2023. Denies any palpitations, SOB, N/V, fever or chills.    Aug 2023 left  Breast lumpectomy

## 2023-12-07 NOTE — BRIEF OPERATIVE NOTE - OPERATION/FINDINGS
EUA revealed normal external genitalia, palpable midline cervix, anteverted mobile uterus, nonpalpable adnexa. Hysteroscopy revealed 2 small polyps at uterine fundus, resected with Aveta. Curettage performed with samples to be sent to pathology. Ostia visualized and within normal limits bilaterally.     Fluid deficit: 250mL

## 2023-12-07 NOTE — ASU DISCHARGE PLAN (ADULT/PEDIATRIC) - NURSING INSTRUCTIONS
OK to take Tylenol/Acetaminophen at 4PM TODAY 12/7 (last dose @  10AM   in operating room)  for pain and every 6 hours after as needed. OK to take Motrin/Ibuprofen at ____________ for pain and every 6 hours after as needed.

## 2023-12-07 NOTE — ASU DISCHARGE PLAN (ADULT/PEDIATRIC) - NS MD DC FALL RISK RISK
For information on Fall & Injury Prevention, visit: [email or web address] OR  [email or web address] OR  https://www.cdc.gov/steadi/patient.html For information on Fall & Injury Prevention, visit: [email or web address] OR  [email or web address]-injury OR  https://www.cdc.gov/steadi/patient.html

## 2023-12-07 NOTE — BRIEF OPERATIVE NOTE - NSICDXBRIEFPROCEDURE_GEN_ALL_CORE_FT
PROCEDURES:  Hysteroscopic polypectomy of uterus with dilation and curettage 07-Dec-2023 10:17:13  Vanessa Maddox

## 2023-12-07 NOTE — ASU DISCHARGE PLAN (ADULT/PEDIATRIC) - CARE PROVIDER_API CALL
[facility],   [facility]  [phone] [address]  [place] [address]  Phone: (   )    -  Fax: (   )    -  Follow Up Time: 2 weeks   [facility],   [facility]  [phone] [address]  [place] [address]  Phone: (   )    -  Fax: (   )    -  Follow Up Time: 2 weeks

## 2023-12-07 NOTE — ASU DISCHARGE PLAN (ADULT/PEDIATRIC) - PROVIDER TOKENS
FREE:[LAST:[[facility]],PHONE:[(   )    -],FAX:[(   )    -],ADDRESS:[[facility]  [phone] [address] [place] [address]],FOLLOWUP:[2 weeks]] FREE:[LAST:[[facility]],PHONE:[(   )    -],FAX:[(   )    -],ADDRESS:[[facility]  [phone] [address] [place] [address]],FOLLOWUP:[2 weeks]]

## 2023-12-07 NOTE — ASU PATIENT PROFILE, ADULT - FALL HARM RISK - UNIVERSAL INTERVENTIONS
Consent 3/Introductory Paragraph: I gave the patient a chance to ask questions they had about the procedure.  Following this I explained the Mohs procedure and consent was obtained. The risks, benefits and alternatives to therapy were discussed in detail. Specifically, the risks of infection, scarring, bleeding, prolonged wound healing, incomplete removal, allergy to anesthesia, nerve injury and recurrence were addressed. Prior to the procedure, the treatment site was clearly identified and confirmed by the patient. All components of Universal Protocol/PAUSE Rule completed. Bed in lowest position, wheels locked, appropriate side rails in place/Call bell, personal items and telephone in reach/Instruct patient to call for assistance before getting out of bed or chair/Non-slip footwear when patient is out of bed/[place] to call system/Physically safe environment - no spills, clutter or unnecessary equipment/Purposeful Proactive Rounding/Room/bathroom lighting operational, light cord in reach Bed in lowest position, wheels locked, appropriate side rails in place/Call bell, personal items and telephone in reach/Instruct patient to call for assistance before getting out of bed or chair/Non-slip footwear when patient is out of bed/[place] to call system/Physically safe environment - no spills, clutter or unnecessary equipment/Purposeful Proactive Rounding/Room/bathroom lighting operational, light cord in reach

## 2023-12-12 LAB
SURGICAL PATHOLOGY STUDY: SIGNIFICANT CHANGE UP
SURGICAL PATHOLOGY STUDY: SIGNIFICANT CHANGE UP

## 2023-12-13 ENCOUNTER — NON-APPOINTMENT (OUTPATIENT)
Age: 48
End: 2023-12-13

## 2023-12-19 ENCOUNTER — APPOINTMENT (OUTPATIENT)
Dept: SURGICAL ONCOLOGY | Facility: CLINIC | Age: 48
End: 2023-12-19
Payer: COMMERCIAL

## 2023-12-19 VITALS
DIASTOLIC BLOOD PRESSURE: 75 MMHG | HEART RATE: 80 BPM | RESPIRATION RATE: 16 BRPM | WEIGHT: 100 LBS | OXYGEN SATURATION: 99 % | SYSTOLIC BLOOD PRESSURE: 116 MMHG | BODY MASS INDEX: 20.16 KG/M2 | HEIGHT: 59 IN

## 2023-12-19 DIAGNOSIS — N60.02 SOLITARY CYST OF RIGHT BREAST: ICD-10-CM

## 2023-12-19 DIAGNOSIS — N60.01 SOLITARY CYST OF RIGHT BREAST: ICD-10-CM

## 2023-12-19 PROCEDURE — 10160 PNXR ASPIR ABSC HMTMA BULLA: CPT

## 2023-12-19 PROCEDURE — 99213 OFFICE O/P EST LOW 20 MIN: CPT | Mod: 25

## 2023-12-19 NOTE — HISTORY OF PRESENT ILLNESS
[de-identified] : ANEL DEL REAL is a 47-year F s/p USGBx of Left 6N4 0.8 irregular hypoechoic mass w/ benign findings found to be discordant. She is now s/p Left lisbeth loc PM and tissue transfer 20cm2 on 2023. Here for 3-month wound check.   23 Patient underwent USGBx of Left 6N4 0.8 cm irregular mass with findings of non- proliferative and focal proliferative fibrocystic change w/ cyst formation, columnar cell changes and stromal fibrosis. scant microcalcs present - found to be radiologically discordant.   Undergoing HR screening, prior hx of b/l cyst aspiration in 2019. Denies any skin changes or nipple discharge, states it hard to tell if Shes felt a palpable mass as she has fibrocystic breasts.  23 Patient is POD # 15. Denies any fevers or chills. Reports some irritation and intermittent pain over surgical incision. After removing steristrips there is some noted infra-areolar skin slothing.   23 POD #36. Denies any fevers or chills. Reports intermittent burning/ skin irritation over surgical site after dermabond application over wound but states they weren't severe enough for her to notify our office and has improved over time.  Left infra-areolar incision is closed with noted granulation tissue and hypopigmentation. No signs of infection. 23 Since last visit she has noted improvement of the left post-lumpectomy scar.  She has increased bilateral (left>right) fibrocystic pain and palpable cysts.    PMHx: Depression,  PSHx: s/p Left ovary and fallopian tube 2/2 ovarian cyst 2020,  Meds: duloxetine 30 mg, famotidine 20 mg, Melatonin, Fish oil, magnesium, collagen,  NKDA Family Hx: Bone cancer (paternal aunt), Question of cervical cancer (paternal grandmother) ??, Throat cancer (maternal uncle), skin cancer ( mother) ? GYN: , menarche age 17, LMP  23 Bra size: 34 A Social: Smoking: Denies       ETOH: Denies

## 2023-12-19 NOTE — ASSESSMENT
[FreeTextEntry1] : ANEL DEL REAL is a 47-year F s/p USGBx of Left 6N4 0.8 irregular hypoechoic mass w/ benign findings found to be discordant. She is now s/p Left lisbeth loc PM and tissue transfer on 8/7/2023. Pathology revealed radial scar w/ other benign changes. Here for 3 month wound check.   In-office ultrasound was used to identify symptomatic cysts in the left breast.  1 cyst (3N2) was aspirated (3ml) under ultrasound guidance using sterile technique and local anesthesia with injection of 25cc 1% lidocaine with epi throughout the left breast.  Cyst aspirations were attempted at the other two largest cyst sites, however, there was no return of fluid on aspirations.    Will refer to radiology for bilateral US guided Cyst aspiration.  Next imaging: B/l SC MG/US due June 2024 RTO in June 2024 after imaging. She knows to return sooner if any breast imaging abnormalities or if any breast issues/concerns arise.

## 2023-12-19 NOTE — PHYSICAL EXAM
[Normal] : supple, no neck mass and thyroid not enlarged [Normal Neck Lymph Nodes] : normal neck lymph nodes  [Normal Supraclavicular Lymph Nodes] : normal supraclavicular lymph nodes [Normal Axillary Lymph Nodes] : normal axillary lymph nodes [Normal] : oriented to person, place and time, with appropriate affect [de-identified] : Diffuse fibrocystic breast tissue.   mobile palpable 1-1.5cm cysts on left breast retroareolar, 3N2, and 7N4.  Left breast surgical incision is well-healed and previous noted hypopigmentation has resolved.  no parenchymal defect.   No palpable mass in either breast.   No clinical lymphadenopathy.  [de-identified] : Normal respiratory effort
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
## 2023-12-19 NOTE — RESULTS/DATA
EKG DONE   [FreeTextEntry1] : BREAST PATH/RAD REVIEW [facility]:  2/19/2020 Breast MRI: BIRADs 2. B/L cysts ( R up to 2.7, L up to 3.3 cm) 8/4/2020 B/L US: BIRADs 2, B/L cysts ( L up to 3.7 cm, R up to 3.4 cm)  3/2/201 B/L SC MG/US (TC 8.7%): BIRADs 2. Extremely dense. R 12:00 & 4:00 at palp AOC c/w multiple simple & complicated cysts up to 3.1 cm, L multiple cysts including cyst cont calcs measuring 5 mm. 5/27/2022 B/L SC MG/US (TC 55.3%): BIRADs 0. Multiple B/L cysts, L 12:00-RA cyst w/ possible intraductal mass (Rec L Dx US) 6/6/2022 L DX US: BIRADs 4B. L 12:00-RA 0.5 cm intracystic mass within a 1.4 cm cyst (Rec USGBx)   6/14/22 Left USGBx ~ Left 12:00 Retroareolar core bx: Breast w/ stromal fibrosis, apocrine metaplasia and focal accumulation of histiocytes. Benign and concordant. Heart clip.( Rec f/u US in 1 yr at time of annual MG)   6/16/2023 B/L SC MG/ B/L Dx MG (TC 55.6%): BIRADs 0. L UOQ and central outer subtle areas of architectural distortion (Rec L Dx MG/US). B/l scattered simple/complex cysts. 6/23/2023 L Dx MG/US: BIRADs 4C. Suspicious L 6N4 0.8 cm irregular hypoechoic mildly shadowing mass ( Rec USGBx and given pts hx may meet criteria for annual SC MRI)  6/30/2023 L USGBx ~ Left 6N4 core bx: Non- proliferative and focal proliferative fibrocystic change w/ cyst formation, columnar cell change and stromal fibrosis. scant microcalcs present. DISCORDANT. Ribbon Clip (rec Surgical c/s)   7/17/2023 Breast MRI: BIRADs 2. - L lower central bx marker associated w/ 1.2 cm enhancement at the site of recent benign but discordant bx (Rec continued surg c/s) - No suspicious enhancement elsewhere in either breast. No axillary or IM lymphadenopathy  - Incidental 6 mm T2 hyperintense Non enhancing hepatic cyst, newly seen  - b/l scattered cysts    8/7/23 s/p L PM, Tissue transfer at [facility]  Path: Left 6:00 mass: Radial scar with UDH, fibrosis and calcs. fibroproliferative including sclerosing adenosis, columnar cell changes and cysts. bx site reaction.

## 2023-12-28 ENCOUNTER — APPOINTMENT (OUTPATIENT)
Dept: OBGYN | Facility: CLINIC | Age: 48
End: 2023-12-28
Payer: COMMERCIAL

## 2023-12-28 ENCOUNTER — OUTPATIENT (OUTPATIENT)
Dept: OUTPATIENT SERVICES | Facility: HOSPITAL | Age: 48
LOS: 1 days | End: 2023-12-28
Payer: SELF-PAY

## 2023-12-28 VITALS — WEIGHT: 103 LBS | SYSTOLIC BLOOD PRESSURE: 124 MMHG | DIASTOLIC BLOOD PRESSURE: 80 MMHG | BODY MASS INDEX: 20.8 KG/M2

## 2023-12-28 DIAGNOSIS — Z98.890 OTHER SPECIFIED POSTPROCEDURAL STATES: Chronic | ICD-10-CM

## 2023-12-28 DIAGNOSIS — N76.0 ACUTE VAGINITIS: ICD-10-CM

## 2023-12-28 DIAGNOSIS — R93.89 ABNORMAL FINDINGS ON DIAGNOSTIC IMAGING OF OTHER SPECIFIED BODY STRUCTURES: ICD-10-CM

## 2023-12-28 PROCEDURE — G0463: CPT

## 2023-12-28 PROCEDURE — 99024 POSTOP FOLLOW-UP VISIT: CPT

## 2023-12-28 PROCEDURE — 99213 OFFICE O/P EST LOW 20 MIN: CPT

## 2023-12-28 NOTE — HISTORY OF PRESENT ILLNESS
[Pain is well-controlled] : pain is well-controlled [Fever] : no fever [Diarrhea] : no diarrhea [Vaginal Discharge] : no vaginal discharge [Normal] : normal [Pathology reviewed] : pathology reviewed [de-identified] : d&c hysteroscopy [de-identified] : aub/polyp [de-identified] : pt seen today, no complaints, states bleeding wnl.

## 2023-12-28 NOTE — ASSESSMENT
[Doing Well] : is doing well [No Sign of Infection] : is showing no signs of infection [de-identified] : path reviewed, discussed perimenopause sx  reviewed plan for annual declining mirena IUD Rossy Orta MD

## 2023-12-29 DIAGNOSIS — R93.89 ABNORMAL FINDINGS ON DIAGNOSTIC IMAGING OF OTHER SPECIFIED BODY STRUCTURES: ICD-10-CM

## 2024-01-16 ENCOUNTER — APPOINTMENT (OUTPATIENT)
Dept: ULTRASOUND IMAGING | Facility: IMAGING CENTER | Age: 49
End: 2024-01-16
Payer: SELF-PAY

## 2024-01-16 ENCOUNTER — RESULT REVIEW (OUTPATIENT)
Age: 49
End: 2024-01-16

## 2024-01-16 ENCOUNTER — OUTPATIENT (OUTPATIENT)
Dept: OUTPATIENT SERVICES | Facility: HOSPITAL | Age: 49
LOS: 1 days | End: 2024-01-16
Payer: SELF-PAY

## 2024-01-16 DIAGNOSIS — Z00.8 ENCOUNTER FOR OTHER GENERAL EXAMINATION: ICD-10-CM

## 2024-01-16 DIAGNOSIS — Z98.890 OTHER SPECIFIED POSTPROCEDURAL STATES: Chronic | ICD-10-CM

## 2024-01-16 PROCEDURE — 76942 ECHO GUIDE FOR BIOPSY: CPT

## 2024-01-16 PROCEDURE — 76642 ULTRASOUND BREAST LIMITED: CPT | Mod: 26,RT

## 2024-01-16 PROCEDURE — 19000 PUNCTURE ASPIR CYST BREAST: CPT | Mod: LT

## 2024-01-16 PROCEDURE — 88173 CYTOPATH EVAL FNA REPORT: CPT | Mod: 26

## 2024-01-16 PROCEDURE — 76642 ULTRASOUND BREAST LIMITED: CPT

## 2024-01-16 PROCEDURE — 76942 ECHO GUIDE FOR BIOPSY: CPT | Mod: 26

## 2024-01-16 PROCEDURE — 88305 TISSUE EXAM BY PATHOLOGIST: CPT | Mod: 26

## 2024-01-16 PROCEDURE — 88173 CYTOPATH EVAL FNA REPORT: CPT

## 2024-01-16 PROCEDURE — 19000 PUNCTURE ASPIR CYST BREAST: CPT

## 2024-01-16 PROCEDURE — 88305 TISSUE EXAM BY PATHOLOGIST: CPT

## 2024-01-20 LAB — NON-GYNECOLOGICAL CYTOLOGY STUDY: SIGNIFICANT CHANGE UP

## 2024-01-30 ENCOUNTER — NON-APPOINTMENT (OUTPATIENT)
Age: 49
End: 2024-01-30

## 2024-02-12 NOTE — ED PROVIDER NOTE - PRINCIPAL DIAGNOSIS
Spoke to patient  Patient states the Lyrica is for the nerves in her legs. She states she did talk to  about this at her previous appointment.     She states the provider in [place] only prescribes the pain medication for her back. She states that provider only does the oxycodone and Tramadol    Patient stated again how this was discussed at her last appointment     Please advise           Undifferentiated abdominal pain

## 2024-02-13 ENCOUNTER — OUTPATIENT (OUTPATIENT)
Dept: OUTPATIENT SERVICES | Facility: HOSPITAL | Age: 49
LOS: 1 days | End: 2024-02-13
Payer: SELF-PAY

## 2024-02-13 ENCOUNTER — APPOINTMENT (OUTPATIENT)
Dept: GASTROENTEROLOGY | Facility: HOSPITAL | Age: 49
End: 2024-02-13
Payer: COMMERCIAL

## 2024-02-13 VITALS — HEART RATE: 65 BPM | SYSTOLIC BLOOD PRESSURE: 99 MMHG | DIASTOLIC BLOOD PRESSURE: 61 MMHG | TEMPERATURE: 96.3 F

## 2024-02-13 DIAGNOSIS — K59.09 OTHER CONSTIPATION: ICD-10-CM

## 2024-02-13 DIAGNOSIS — R19.8 OTHER SPECIFIED SYMPTOMS AND SIGNS INVOLVING THE DIGESTIVE SYSTEM AND ABDOMEN: ICD-10-CM

## 2024-02-13 DIAGNOSIS — K21.9 GASTRO-ESOPHAGEAL REFLUX DISEASE WITHOUT ESOPHAGITIS: ICD-10-CM

## 2024-02-13 DIAGNOSIS — Z98.890 OTHER SPECIFIED POSTPROCEDURAL STATES: Chronic | ICD-10-CM

## 2024-02-13 DIAGNOSIS — M62.89 OTHER SPECIFIED DISORDERS OF MUSCLE: ICD-10-CM

## 2024-02-13 DIAGNOSIS — R10.9 UNSPECIFIED ABDOMINAL PAIN: ICD-10-CM

## 2024-02-13 DIAGNOSIS — R10.814 LEFT LOWER QUADRANT ABDOMINAL TENDERNESS: ICD-10-CM

## 2024-02-13 DIAGNOSIS — K21.9 GASTRO-ESOPHAGEAL REFLUX DISEASE W/OUT ESOPHAGITIS: ICD-10-CM

## 2024-02-13 PROCEDURE — ZZZZZ: CPT | Mod: GC

## 2024-02-13 PROCEDURE — G0463: CPT

## 2024-02-13 RX ORDER — POLYETHYLENE GLYCOL 3350 AND ELECTROLYTES WITH LEMON FLAVOR 236; 22.74; 6.74; 5.86; 2.97 G/4L; G/4L; G/4L; G/4L; G/4L
236 POWDER, FOR SOLUTION ORAL
Qty: 1 | Refills: 0 | Status: ACTIVE | COMMUNITY
Start: 2024-02-13 | End: 1900-01-01

## 2024-02-13 NOTE — ASSESSMENT
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  #Acid reflux 2/2 functional dyspepsia -c/w duloxetine 30mg qD (has supply) -discussed using a pill box to help remember to take tablets and discussed taking at same time everyday -patient agreeable to continue with same dose as symptoms are controlled and she has no side effects  #Chronic Constipation & LLQ AP -start psyllium BiD (was only taking 1x/wk) -start miralax qHs -discussed titrating meds if going to often or still without imrovement/regularity -patient refuses pelvic floor rehab referral, she completed 6 months in the past, and states she has the exercises on a hand out and would like to do them on her own at home. [place] and [place] would find it hard to go  #BRBPR -plan for colonoscopy -2 day clear liquids -miralax TIDAC -golytely split prep -dulcolax tabs   Wisam Camara MD PGY-5 GI/Hepatology Fellow  Discussed with Dr. Padilla

## 2024-02-13 NOTE — END OF VISIT
[] : Fellow [FreeTextEntry3] : Agree with above. Patient with chronic constipation, and has pelvic floor dyssynergy. She wants to continue independent pelvic floor therapy as she's had prior instruction. Continue fiber supplementation BID with Miralax once daily. Will repeat colonoscopy given new hemotochezia. Continue duloxetine for functional dyspepsia which has been effective without side effects. [Time Spent: ___ minutes] : I have spent [unfilled] minutes of time on the encounter.

## 2024-02-13 NOTE — PHYSICAL EXAM
[Alert] : alert [Healthy Appearing] : healthy appearing [No Acc Muscle Use] : no accessory muscle use [Bowel Sounds] : normal bowel sounds [Abdomen Tenderness] : non-tender [No Masses] : no abdominal mass palpated [Oriented To Time, Place, And Person] : oriented to person, place, and time [Normal S1, S2] : normal S1 and S2 [Abnormal Walk] : normal gait [Normal Color / Pigmentation] : normal skin color and pigmentation

## 2024-02-13 NOTE — HISTORY OF PRESENT ILLNESS
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  #Acid reflux 2/2 functional dyspepsia being controlled on duloxetine 30mg qD. Tried amityryptiline and imipramine in the past (discontinued as did not find benefit). No longer taking pepcid. Patient reports that she occasionally misses 1 dose/wk and takes medication at different times everyday, but has not noted any side effects with medication. We discussed using a pill box and trying to take duloxetine at regular time everyday.  #Chronic Constipation & LLQ AP: w/u as below. Patient did pelvic floor training/therapy x6 months, but currently stopped. Patient reports she would not liek to go back to do it again, but remembers the exercises and has a print out from when she went to the classes at home. She reports because of her work and schedule she would prefer to do exercises at home independently. Patient also reports she has not been taking psyllium bid as recommended and has only been taking once per week. She has not been taking any laxatives. Patient notes that she has some LLQ AP, worse when she hasnt had a BM and better when she has a BM. She still has straining. No manual disimpaction needed.  #BRBPR patient notes 1 month ago she had 2 episodes of small amount of blood on toilet paper and on stool (not mixed in). She has a known hx of internal hemorrhoids. Patient reports her most recent BM's have not had blood.  Anorectal manometry consistent with type 2 dyssynergia  MR Defecography 6/2022 - IMPRESSION: *  Anatomic findings: No significant thinning or tearing *  Exam is markedly limited by poor defecatory effort. The patient was unable to evacuate any of the administered rectal gel. *  Posterior compartment: Small 3.0 cm anterior rectocele. Paradoxical contraction of the puborectalis muscle during attempted defecation suggestive of pelvic floor dyssynergia.  EGD 9/9/2022 - gastritis. bx negative for HP and IM C-scope - 2020 - nonbleeding internal hemorrhoids, no polyps. Path negative

## 2024-02-13 NOTE — REVIEW OF SYSTEMS
[As Noted in HPI] : as noted in HPI [Recent Weight Loss (___ Lbs)] : no recent weight loss [Negative] : Heme/Lymph

## 2024-02-28 ENCOUNTER — APPOINTMENT (OUTPATIENT)
Dept: INTERNAL MEDICINE | Facility: CLINIC | Age: 49
End: 2024-02-28
Payer: COMMERCIAL

## 2024-02-28 ENCOUNTER — OUTPATIENT (OUTPATIENT)
Dept: OUTPATIENT SERVICES | Facility: HOSPITAL | Age: 49
LOS: 1 days | End: 2024-02-28
Payer: SELF-PAY

## 2024-02-28 VITALS
HEART RATE: 57 BPM | SYSTOLIC BLOOD PRESSURE: 100 MMHG | DIASTOLIC BLOOD PRESSURE: 60 MMHG | HEIGHT: 60 IN | WEIGHT: 102 LBS | BODY MASS INDEX: 20.03 KG/M2 | OXYGEN SATURATION: 97 %

## 2024-02-28 DIAGNOSIS — Z00.00 ENCOUNTER FOR GENERAL ADULT MEDICAL EXAMINATION W/OUT ABNORMAL FINDINGS: ICD-10-CM

## 2024-02-28 DIAGNOSIS — Z98.890 OTHER SPECIFIED POSTPROCEDURAL STATES: Chronic | ICD-10-CM

## 2024-02-28 DIAGNOSIS — I10 ESSENTIAL (PRIMARY) HYPERTENSION: ICD-10-CM

## 2024-02-28 PROCEDURE — 80061 LIPID PANEL: CPT

## 2024-02-28 PROCEDURE — 87086 URINE CULTURE/COLONY COUNT: CPT

## 2024-02-28 PROCEDURE — 80053 COMPREHEN METABOLIC PANEL: CPT

## 2024-02-28 PROCEDURE — G0463: CPT

## 2024-02-28 PROCEDURE — 83036 HEMOGLOBIN GLYCOSYLATED A1C: CPT

## 2024-02-28 PROCEDURE — 99214 OFFICE O/P EST MOD 30 MIN: CPT | Mod: GC

## 2024-02-28 RX ORDER — FAMOTIDINE 20 MG/1
20 TABLET, FILM COATED ORAL
Qty: 60 | Refills: 3 | Status: DISCONTINUED | COMMUNITY
Start: 2022-11-22 | End: 2024-02-28

## 2024-02-29 LAB
ALBUMIN SERPL ELPH-MCNC: 4.3 G/DL
ALP BLD-CCNC: 46 U/L
ALT SERPL-CCNC: 17 U/L
ANION GAP SERPL CALC-SCNC: 12 MMOL/L
APPEARANCE: CLEAR
AST SERPL-CCNC: 16 U/L
BILIRUB SERPL-MCNC: 0.4 MG/DL
BILIRUBIN URINE: NEGATIVE
BLOOD URINE: NEGATIVE
BUN SERPL-MCNC: 9 MG/DL
CALCIUM SERPL-MCNC: 8.7 MG/DL
CHLORIDE SERPL-SCNC: 103 MMOL/L
CHOLEST SERPL-MCNC: 157 MG/DL
CO2 SERPL-SCNC: 22 MMOL/L
COLOR: YELLOW
CREAT SERPL-MCNC: 0.71 MG/DL
EGFR: 106 ML/MIN/1.73M2
ESTIMATED AVERAGE GLUCOSE: 100 MG/DL
GLUCOSE QUALITATIVE U: NEGATIVE MG/DL
GLUCOSE SERPL-MCNC: 77 MG/DL
HBA1C MFR BLD HPLC: 5.1 %
HDLC SERPL-MCNC: 62 MG/DL
KETONES URINE: 15 MG/DL
LDLC SERPL CALC-MCNC: 81 MG/DL
LEUKOCYTE ESTERASE URINE: NEGATIVE
NITRITE URINE: NEGATIVE
NONHDLC SERPL-MCNC: 95 MG/DL
PH URINE: 6
POTASSIUM SERPL-SCNC: 4.3 MMOL/L
PROT SERPL-MCNC: 6.8 G/DL
PROTEIN URINE: NEGATIVE MG/DL
SODIUM SERPL-SCNC: 137 MMOL/L
SPECIFIC GRAVITY URINE: 1.02
TRIGL SERPL-MCNC: 74 MG/DL
UROBILINOGEN URINE: 0.2 MG/DL

## 2024-03-01 LAB — BACTERIA UR CULT: NORMAL

## 2024-03-06 NOTE — REVIEW OF SYSTEMS
[Vision Problems] : vision problems [Negative] : Heme/Lymph [FreeTextEntry8] : +Urge incontinence [FreeTextEntry9] : +L elbow and knee pain

## 2024-03-06 NOTE — ASSESSMENT
[FreeTextEntry1] : Ms. Arce is a 49 YO woman with PMH of fibromyalgia, somatic symptom disorder, anxiety, IBS, R benign breast mass, who presents for CPE.   L-sided elbow + knee pain -Encouraged pt to purchase an elbow brace to aid with elbow pain. - Voltaren gel as needed  Urge incontinence - Pt was offered trial of oxybutynin but declined due to possible exacerbation of constipation. - Continue pelvic floor exercises  HCM - Mammogram due 6/2024 per surgical oncology - CBC, CMP, A1c, lipids - Declined Flu vaccine today

## 2024-03-06 NOTE — HEALTH RISK ASSESSMENT
[PHQ-2 Negative - No further assessment needed] : PHQ-2 Negative - No further assessment needed [No] : In the past 12 months have you used drugs other than those required for medical reasons? No [Employed] : employed [Single] : single [With Family] : lives with family [Fully functional (using the telephone, shopping, preparing meals, housekeeping, doing laundry, using] : Fully functional and needs no help or supervision to perform IADLs (using the telephone, shopping, preparing meals, housekeeping, doing laundry, using transportation, managing medications and managing finances) [Fully functional (bathing, dressing, toileting, transferring, walking, feeding)] : Fully functional (bathing, dressing, toileting, transferring, walking, feeding) [Reports changes in dental health] : Reports changes in dental health [Reports changes in vision] : Reports changes in vision [Never] : Never [de-identified] : Work [de-identified] : Feels she is subjectively overeating as above [Sexually Active] : not sexually active [de-identified] : Lives with 2 cousins [Reports changes in hearing] : Reports no changes in hearing [de-identified] : Pt notes she will make an appointment with optometrist [FreeTextEntry2] :  [de-identified] : S/p tooth implant 2 weeks ago. S/p course of amoxicillin

## 2024-03-06 NOTE — PHYSICAL EXAM
[No Acute Distress] : no acute distress [Well Developed] : well developed [Well-Appearing] : well-appearing [Normal Outer Ear/Nose] : the outer ears and nose were normal in appearance [Regular Rhythm] : with a regular rhythm [Normal Rate] : normal rate  [Normal S1, S2] : normal S1 and S2 [Pedal Pulses Present] : the pedal pulses are present [No Extremity Clubbing/Cyanosis] : no extremity clubbing/cyanosis [Soft] : abdomen soft [Non Tender] : non-tender [Non-distended] : non-distended [No Rash] : no rash [Normal Supraclavicular Nodes] : no supraclavicular lymphadenopathy [Grossly Normal Strength/Tone] : grossly normal strength/tone [Normal Posterior Cervical Nodes] : no posterior cervical lymphadenopathy [Normal Gait] : normal gait [Coordination Grossly Intact] : coordination grossly intact [Kyphosis] : no kyphosis [de-identified] : L lateral epicondyle pain  L lateral epicondyle pain; L hand pain throughout all 5 digits [de-identified] : Somewhat anxious appearing

## 2024-03-06 NOTE — INTERPRETER SERVICES
[Patient Declined  Services] : - None: Patient declined  services [FreeTextEntry3] : Patient speaks English

## 2024-03-06 NOTE — HISTORY OF PRESENT ILLNESS
[FreeTextEntry1] : CPE [de-identified] : Ms. Arce is a 47 YO woman with PMH of fibromyalgia, somatic symptom disorder, anxiety, IBS, R benign breast mass, who presents for CPE.   L sided elbow + knee pain -For past 2 months pt endorses increased L sided elbow + knee pain that is constant & non-radiating in nature. 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 Mentions that "collagen supplements" are the only thing that helps the pain. Pt denies erythema, inflammation, pain on palpation.   Diet -Pt is concerned about her recent unhealthy diet choices, including excess consumption of "sweets." Pt finds herself frequently buying foods with more sugar, citing "fruits" and "chocolates" as common purchases. Is interested in getting bloodwork to check her risk for DM.  Urge incontinence -Pt states she has trouble making it to the bathroom in time and endorses a recent episode of "peeing in pants." Pt admits to having a "container" by her bedside that she uses at night as she is unable to get to the bathroom quick enough. Pt denies dysuria, hematuria.

## 2024-03-11 DIAGNOSIS — Z00.00 ENCOUNTER FOR GENERAL ADULT MEDICAL EXAMINATION WITHOUT ABNORMAL FINDINGS: ICD-10-CM

## 2024-03-13 ENCOUNTER — OUTPATIENT (OUTPATIENT)
Dept: OUTPATIENT SERVICES | Facility: HOSPITAL | Age: 49
LOS: 1 days | End: 2024-03-13
Payer: SELF-PAY

## 2024-03-13 ENCOUNTER — TRANSCRIPTION ENCOUNTER (OUTPATIENT)
Age: 49
End: 2024-03-13

## 2024-03-13 VITALS
OXYGEN SATURATION: 100 % | SYSTOLIC BLOOD PRESSURE: 124 MMHG | HEART RATE: 50 BPM | DIASTOLIC BLOOD PRESSURE: 61 MMHG | RESPIRATION RATE: 18 BRPM

## 2024-03-13 VITALS
HEIGHT: 59 IN | SYSTOLIC BLOOD PRESSURE: 114 MMHG | HEART RATE: 57 BPM | DIASTOLIC BLOOD PRESSURE: 56 MMHG | OXYGEN SATURATION: 100 % | RESPIRATION RATE: 12 BRPM | TEMPERATURE: 98 F | WEIGHT: 102.96 LBS

## 2024-03-13 DIAGNOSIS — Z98.890 OTHER SPECIFIED POSTPROCEDURAL STATES: Chronic | ICD-10-CM

## 2024-03-13 DIAGNOSIS — K59.09 OTHER CONSTIPATION: ICD-10-CM

## 2024-03-13 PROCEDURE — 45378 DIAGNOSTIC COLONOSCOPY: CPT

## 2024-03-13 PROCEDURE — 45378 DIAGNOSTIC COLONOSCOPY: CPT | Mod: GC

## 2024-03-13 DEVICE — NET RETRV ROT ROTH 2.5MMX230CM: Type: IMPLANTABLE DEVICE | Status: FUNCTIONAL

## 2024-03-13 RX ORDER — FAMOTIDINE 10 MG/ML
1 INJECTION INTRAVENOUS
Refills: 0 | DISCHARGE

## 2024-03-13 RX ORDER — OMEGA-3 ACID ETHYL ESTERS 1 G
700 CAPSULE ORAL
Refills: 0 | DISCHARGE

## 2024-03-13 RX ORDER — DULOXETINE HYDROCHLORIDE 30 MG/1
1 CAPSULE, DELAYED RELEASE ORAL
Refills: 0 | DISCHARGE

## 2024-03-13 RX ORDER — CALCIUM CARBONATE 500(1250)
2 TABLET ORAL
Refills: 0 | DISCHARGE

## 2024-03-13 RX ORDER — LANOLIN ALCOHOL/MO/W.PET/CERES
1 CREAM (GRAM) TOPICAL
Refills: 0 | DISCHARGE

## 2024-03-13 RX ORDER — SODIUM CHLORIDE 9 MG/ML
500 INJECTION INTRAMUSCULAR; INTRAVENOUS; SUBCUTANEOUS
Refills: 0 | Status: COMPLETED | OUTPATIENT
Start: 2024-03-13 | End: 2024-03-13

## 2024-03-13 RX ADMIN — SODIUM CHLORIDE 75 MILLILITER(S): 9 INJECTION INTRAMUSCULAR; INTRAVENOUS; SUBCUTANEOUS at 14:19

## 2024-03-13 NOTE — PRE PROCEDURE NOTE - PRE PROCEDURE EVALUATION
Pre-Endoscopy Evaluation    Attending Physician: Dr. Corazon Fernandez    Procedure:  Colonoscopy     Indication for Procedure: Hematochezia     Pertinent History: See [employer] chart    PAST MEDICAL & SURGICAL HISTORY:  H pylori ulcer  treated      Insomnia      Fibromyalgia      HPV (human papilloma virus) infection      Complex ovarian cyst      GERD (gastroesophageal reflux disease)      Other abnormal and inconclusive findings on diagnostic imaging of breast      H/O oral surgery      S/P endoscopy  9/2020      History of salpingostomy      H/O breast surgery          Allergies    No Known Drug Allergies  latex (Rash (Mild to Mod); Urticaria (Mild to Mod))    Intolerances        Medications: MEDICATIONS  (STANDING):    MEDICATIONS  (PRN):      Pertinent lab data:                      Physical Examination:  Daily Height in cm: 149.86 (13 Mar 2024 13:03)    Daily   Vital Signs Last 24 Hrs  T(C): 36.7 (13 Mar 2024 13:03), Max: 36.7 (13 Mar 2024 13:03)  T(F): 98.1 (13 Mar 2024 13:03), Max: 98.1 (13 Mar 2024 13:03)  HR: 57 (13 Mar 2024 13:03) (57 - 57)  BP: 114/56 (13 Mar 2024 13:03) (114/56 - 114/56)  BP(mean): --  RR: 12 (13 Mar 2024 13:03) (12 - 12)  SpO2: 100% (13 Mar 2024 13:03) (100% - 100%)    Parameters below as of 13 Mar 2024 13:03  Patient On (Oxygen Delivery Method): room air      Constitutional: NAD  HEENT: EOMI  Neck:  No JVD  Respiratory: normal respiratory effort  Gastrointestinal: soft, NT/ND  Extremities: No peripheral edema  Neurological: A/O x 3, no focal deficits  Psychiatric: Normal mood, normal affect  : No Root  Skin: No rashes    Comments:    ASA Class: I []  II [x]  III []  IV []    The patient is a suitable candidate for the planned procedure unless box checked [ ]  No, explain:

## 2024-03-13 NOTE — ASU PATIENT PROFILE, ADULT - FALL HARM RISK - CONCLUSION
Pt calling and wondering if Dr could schedule back injections in his back per the radiology reports and recommendation from them. Please call him. Also calling stating that Ambien is not working for him and wondering if there is something else that he can take.    Universal Safety Interventions

## 2024-04-11 ENCOUNTER — NON-APPOINTMENT (OUTPATIENT)
Age: 49
End: 2024-04-11

## 2024-05-09 NOTE — ASU PREOP CHECKLIST - HEIGHT IN CM
149.86 Patient with significant past medical history of osteoarthritis presents emergency department for evaluation of left shoulder pain.  Patient states that she had gradual onset of pain 2 days ago that has been constant nonradiating dull in nature made worse with activity improved by nothing.  Patient denies numbness tingling loss sensation headache neck pain weakness shortness of breath difficulty breathing chest pain.

## 2024-06-18 ENCOUNTER — APPOINTMENT (OUTPATIENT)
Dept: SURGICAL ONCOLOGY | Facility: CLINIC | Age: 49
End: 2024-06-18

## 2024-06-18 VITALS
DIASTOLIC BLOOD PRESSURE: 74 MMHG | SYSTOLIC BLOOD PRESSURE: 129 MMHG | OXYGEN SATURATION: 99 % | RESPIRATION RATE: 17 BRPM | HEIGHT: 60 IN | HEART RATE: 56 BPM

## 2024-06-18 PROCEDURE — 99212 OFFICE O/P EST SF 10 MIN: CPT

## 2024-06-18 NOTE — ASSESSMENT
[FreeTextEntry1] : ANEL DEL REAL is a 48-year F w/ L 6N4 0.8 cm mass w/ benign and discordant findings s/p Left PM, TT on 8/7/2023 [path w/ radial scar]. s/p needle aspiration of L 3:00-RA cyst on 1/16/24. Here for follow up.    Next imaging: B/l SC MG/US due June 2024 (now)  RTO in 6 months. She knows to return sooner if any breast imaging abnormalities or if any breast issues/concerns arise.

## 2024-06-18 NOTE — HISTORY OF PRESENT ILLNESS
[de-identified] : ANEL DEL REAL is a 48-year F w/ L 6N4 0.8 cm mass w/ benign and discordant findings s/p Left PM, TT on 2023 [path w/ radial scar]. s/p needle aspiration of L 3:00-RA cyst on 24. Here for follow up.   23 Patient underwent USGBx of Left 6N4 0.8 cm irregular mass with findings of non- proliferative and focal proliferative fibrocystic change w/ cyst formation, columnar cell changes and stromal fibrosis. scant microcalcs present - found to be radiologically discordant.   Undergoing HR screening, prior hx of b/l cyst aspiration in 2019. Denies any skin changes or nipple discharge, states it hard to tell if Shes felt a palpable mass as she has fibrocystic breasts.  23 Patient is POD # 15. Denies any fevers or chills. Reports some irritation and intermittent pain over surgical incision. After removing steri-strips there is some noted infra-areolar skin slothing.   23 POD #36. Denies any fevers or chills. Reports intermittent burning/ skin irritation over surgical site after dermabond application over wound but states they weren't severe enough for her to notify our office and has improved over time.  Left infra-areolar incision is closed with noted granulation tissue and hypopigmentation. No signs of infection. 23 Since last visit she has noted improvement of the left post-lumpectomy scar.  She has increased bilateral (left>right) fibrocystic pain and palpable cysts.  s/p aspiration of L 3N2 cyst (3 ccs removed). Cyst aspirations were attempted at the other two largest cyst sites, however, there was no return of fluid on aspirations. She was referred for bilateral US guided Cyst aspiration. 24 R Dx US showed a 5.1 cm R RA cyst that was not symptomatic. Patient declined US-guided cyst aspiration. s/p needle aspiration of L RA-3:00 symptomatic cyst.  24. Since last visit reports intermittent burning pain diffusely over left breast, but more prominent over left axilla. States that she tries not to shave over the area 2/2 pain.   PMHx: Depression,  PSHx: s/p Left ovary and fallopian tube 2/2 ovarian cyst 2020,  Meds: duloxetine 30 mg, famotidine 20 mg, Melatonin, Fish oil, magnesium, collagen,  NKDA Family Hx: Bone cancer (paternal aunt), Question of cervical cancer (paternal grandmother) ??, Throat cancer (maternal uncle), skin cancer ( mother) ? GYN: , menarche age 17, LMP  23 Bra size: 34 A Social: Smoking: Denies       ETOH: Denies

## 2024-06-18 NOTE — PHYSICAL EXAM
[Normal] : supple, no neck mass and thyroid not enlarged [Normal Neck Lymph Nodes] : normal neck lymph nodes  [Normal Supraclavicular Lymph Nodes] : normal supraclavicular lymph nodes [Normal Axillary Lymph Nodes] : normal axillary lymph nodes [Normal] : oriented to person, place and time, with appropriate affect [de-identified] : Diffuse fibrocystic breast tissue.   mobile palpable 1-1.5cm cysts on left breast retroareolar, 3N2, and 7N4.  Left breast surgical incision is well-healed and previous noted hypopigmentation has resolved.  no parenchymal defect.   No palpable mass in either breast.   No clinical lymphadenopathy.  [de-identified] : Normal respiratory effort

## 2024-06-18 NOTE — RESULTS/DATA
[FreeTextEntry1] : BREAST PATH/RAD REVIEW [facility]:  2/19/2020 Breast MRI: BIRADs 2. B/L cysts ( R up to 2.7, L up to 3.3 cm) 8/4/2020 B/L US: BIRADs 2, B/L cysts ( L up to 3.7 cm, R up to 3.4 cm)  3/2/201 B/L SC MG/US (TC 8.7%): BIRADs 2. Extremely dense. R 12:00 & 4:00 at palp AOC c/w multiple simple & complicated cysts up to 3.1 cm, L multiple cysts including cyst cont calcs measuring 5 mm. 5/27/2022 B/L SC MG/US (TC 55.3%): BIRADs 0. Multiple B/L cysts, L 12:00-RA cyst w/ possible intraductal mass (Rec L Dx US) 6/6/2022 L DX US: BIRADs 4B. L 12:00-RA 0.5 cm intracystic mass within a 1.4 cm cyst (Rec USGBx)   6/14/22 Left USGBx ~ Left 12:00 Retroareolar core bx: Breast w/ stromal fibrosis, apocrine metaplasia and focal accumulation of histiocytes. Benign and concordant. Heart clip.( Rec f/u US in 1 yr at time of annual MG)   6/16/2023 B/L SC MG/ B/L Dx MG (TC 55.6%): BIRADs 0. L UOQ and central outer subtle areas of architectural distortion (Rec L Dx MG/US). B/l scattered simple/complex cysts. 6/23/2023 L Dx MG/US: BIRADs 4C. Suspicious L 6N4 0.8 cm irregular hypoechoic mildly shadowing mass ( Rec USGBx and given pts hx may meet criteria for annual SC MRI)  6/30/2023 L USGBx ~ Left 6N4 core bx: Non- proliferative and focal proliferative fibrocystic change w/ cyst formation, columnar cell change and stromal fibrosis. scant microcalcs present. DISCORDANT. Ribbon Clip (rec Surgical c/s)   7/17/2023 Breast MRI: BIRADs 2. - L lower central bx marker associated w/ 1.2 cm enhancement at the site of recent benign but discordant bx (Rec continued surg c/s) - No suspicious enhancement elsewhere in either breast. No axillary or IM lymphadenopathy  - Incidental 6 mm T2 hyperintense Non enhancing hepatic cyst, newly seen  - b/l scattered cysts    8/7/23 s/p L PM, [facility] transfer at [facility]  Path: Left 6:00 mass: Radial scar with UDH, fibrosis and calcs. fibroproliferative including sclerosing adenosis, columnar cell changes and cysts. bx site reaction.   1/16/24 R Dx US: BIRADs 2. R RA 5.1 cm simple cyst w/o tenderness or pain on palpation. Patient declined US-guided cyst aspiration at this time.  1/16/24 s/p US-guided needle aspiration of tender L 3:00 RA cyst. Negative for malignant cells c/w apocrine cyst contents. Benign and concordant. (Rec clinical f/u regarding any residual tenderness at sites of aspiration)

## 2024-07-23 ENCOUNTER — APPOINTMENT (OUTPATIENT)
Dept: GASTROENTEROLOGY | Facility: HOSPITAL | Age: 49
End: 2024-07-23
Payer: SELF-PAY

## 2024-07-23 DIAGNOSIS — G89.29 LEFT LOWER QUADRANT PAIN: ICD-10-CM

## 2024-07-23 DIAGNOSIS — Z78.9 OTHER SPECIFIED HEALTH STATUS: ICD-10-CM

## 2024-07-23 DIAGNOSIS — R12 HEARTBURN: ICD-10-CM

## 2024-07-23 DIAGNOSIS — R10.32 LEFT LOWER QUADRANT PAIN: ICD-10-CM

## 2024-07-23 DIAGNOSIS — K59.02 OUTLET DYSFUNCTION CONSTIPATION: ICD-10-CM

## 2024-07-23 DIAGNOSIS — K59.09 OTHER CONSTIPATION: ICD-10-CM

## 2024-07-23 PROCEDURE — ZZZZZ: CPT | Mod: GC

## 2024-07-23 RX ORDER — FAMOTIDINE 20 MG/1
20 TABLET, FILM COATED ORAL
Qty: 60 | Refills: 2 | Status: ACTIVE | COMMUNITY
Start: 2024-07-23 | End: 1900-01-01

## 2024-07-23 RX ORDER — SUCRALFATE 1 G/1
1 TABLET ORAL 4 TIMES DAILY
Qty: 56 | Refills: 0 | Status: ACTIVE | COMMUNITY
Start: 2024-07-23 | End: 1900-01-01

## 2024-07-23 RX ORDER — SUCRALFATE 1 G/10ML
1 SUSPENSION ORAL 4 TIMES DAILY
Qty: 1 | Refills: 1 | Status: ACTIVE | COMMUNITY
Start: 2024-07-23 | End: 1900-01-01

## 2024-07-23 NOTE — ASSESSMENT
[FreeTextEntry1] : 48 year old woman with lactose intolerance, chronic constipation, type 2 dysnergia, fibromyalgia, and functional dyspepsia who presents for follow up of chronic GI problems.  #Heartburn 2/2 functional dyspepsia -c/w duloxetine 30mg qD for now (instructed patient to not stop suddenly) until further workup -Refilled Pepcid -Trial of sucralfate (tablet crushed and mix with 10-15 mL water) -Will send for esophageal manometry + pH impedance test  #Chronic Constipation & LLQ abdominal pain -Consistent psyllium use to ensure easy BM's daily  Case d/w Dr. Louie Raines, PGY-6

## 2024-07-23 NOTE — REVIEW OF SYSTEMS
[As Noted in HPI] : as noted in HPI [Negative] : Respiratory [Fever] : no fever [Chills] : no chills [Recent Weight Loss (___ Lbs)] : no recent weight loss

## 2024-07-23 NOTE — PHYSICAL EXAM
[Normal Voice/Communication] : normal voice/communication [No Acute Distress] : no acute distress [No Respiratory Distress] : no respiratory distress [No Acc Muscle Use] : no accessory muscle use [Abdomen Soft] : soft [Epigastric] : epigastric [Abnormal Walk] : normal gait [No Clubbing, Cyanosis] : no clubbing or cyanosis of the fingernails [No Joint Swelling] : no joint swelling seen [Oriented To Time, Place, And Person] : oriented to person, place, and time

## 2024-07-23 NOTE — HISTORY OF PRESENT ILLNESS
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  #Heartburn 2/2 functional dyspepsia: Patient previously had good control on duloxetine 30mg qD, but symptoms progressed in the past 3 months despite taking daily medication. She tried amitryptline and imipramine in the past (discontinued as did not find benefit), as well as many forms of PPI. Pepcid works sometimes, and patient still takes it intermittently. Patient reported GI symptoms started 2 months after COVID infection during the first wave. She had prior EGD's without evidence of EoE, reflux esophagitis, HP or GIM and had negative BRAVO study (9/2022), as well as normal gastric emptying study. No prior manometry or pH impedance test. Patient has been restricting her diet as a lot of foods/fruits/liquids can trigger heartburn.   #Chronic Constipation & LLQ AP: w/u as below. Patient did pelvic floor training/therapy x6 months in the past, and didn't want t resume during last visit. Patient reported taking psyllium as needed (not daily) due to concern of dependence. When she took psyllium, it worked very well. Otherwise she eats a lot of fruits (only the ones tolerable for heartburn symptoms, likely banana, papaya, apple etc.) and vegetables, and has BM's every other day. She hasn't had any GIB episodes.    Anorectal manometry consistent with type 2 dysynergia  MR Defecography 6/2022 - IMPRESSION: * Anatomic findings: No significant thinning or tearing * Exam is markedly limited by poor defecatory effort. The patient was unable to evacuate any of the administered rectal gel. * Posterior compartment: Small 3.0 cm anterior rectocele. Paradoxical contraction of the puborectalis muscle during attempted defecation suggestive of pelvic floor dyssynergia.  EGD 9/9/2022 - gastritis. bx negative for HP and IM C-scope - 2020 - nonbleeding internal hemorrhoids, no polyps. Path negative

## 2024-07-23 NOTE — END OF VISIT
[] : Fellow [FreeTextEntry3] : As modified and discussed with patient MD KERRY Batres [facility] Associate Professor of Medicine [facility] of [facility]   [Time Spent: ___ minutes] : I have spent [unfilled] minutes of time on the encounter.

## 2024-08-29 ENCOUNTER — OUTPATIENT (OUTPATIENT)
Dept: OUTPATIENT SERVICES | Facility: HOSPITAL | Age: 49
LOS: 1 days | End: 2024-08-29
Payer: SELF-PAY

## 2024-08-29 ENCOUNTER — APPOINTMENT (OUTPATIENT)
Dept: INTERNAL MEDICINE | Facility: CLINIC | Age: 49
End: 2024-08-29
Payer: COMMERCIAL

## 2024-08-29 VITALS
HEART RATE: 63 BPM | HEIGHT: 60 IN | DIASTOLIC BLOOD PRESSURE: 70 MMHG | OXYGEN SATURATION: 99 % | WEIGHT: 106 LBS | SYSTOLIC BLOOD PRESSURE: 124 MMHG | BODY MASS INDEX: 20.81 KG/M2

## 2024-08-29 DIAGNOSIS — Z98.890 OTHER SPECIFIED POSTPROCEDURAL STATES: Chronic | ICD-10-CM

## 2024-08-29 DIAGNOSIS — L73.9 FOLLICULAR DISORDER, UNSPECIFIED: ICD-10-CM

## 2024-08-29 DIAGNOSIS — N76.4 ABSCESS OF VULVA: ICD-10-CM

## 2024-08-29 DIAGNOSIS — H52.4 PRESBYOPIA: ICD-10-CM

## 2024-08-29 PROCEDURE — 99213 OFFICE O/P EST LOW 20 MIN: CPT | Mod: GE

## 2024-08-29 PROCEDURE — G0463: CPT

## 2024-08-29 RX ORDER — BENZOYL PEROXIDE 10 %
10 GEL (GRAM) TOPICAL 3 TIMES DAILY
Qty: 1 | Refills: 0 | Status: ACTIVE | COMMUNITY
Start: 2024-08-29 | End: 1900-01-01

## 2024-08-29 RX ORDER — CLINDAMYCIN PHOSPHATE 1 G/10ML
1 GEL TOPICAL DAILY
Qty: 1 | Refills: 0 | Status: ACTIVE | COMMUNITY
Start: 2024-08-29 | End: 1900-01-01

## 2024-08-29 NOTE — HISTORY OF PRESENT ILLNESS
[FreeTextEntry1] : f/u [de-identified] : 49F PMH fibromyalgia, somatic symptom dx, anxiety, IBS, R benign breast mass p/f vulvar mass and blurry bision. Pt states that blurred vision has been worsening for 4 months. She wears glasses and notes difficulty reading and watching TV. She states that her whole vision is blurry and is not localized to specific area of her visual field. Denies any pain, no redness.   Pt also endorses vulvar lesion that she noticed 3 days ago after she shaved. Denies any pain, discharge. Denies sexual contacts, fevers or chills. Not pruritic. Pt also states similar lesion on her back lateral thigh.

## 2024-08-29 NOTE — PHYSICAL EXAM
[No Acute Distress] : no acute distress [Well Nourished] : well nourished [Well Developed] : well developed [Well-Appearing] : well-appearing [Normal Sclera/Conjunctiva] : normal sclera/conjunctiva [PERRL] : pupils equal round and reactive to light [EOMI] : extraocular movements intact [Normal Outer Ear/Nose] : the outer ears and nose were normal in appearance [Normal Oropharynx] : the oropharynx was normal [No JVD] : no jugular venous distention [No Lymphadenopathy] : no lymphadenopathy [Supple] : supple [Thyroid Normal, No Nodules] : the thyroid was normal and there were no nodules present [No Respiratory Distress] : no respiratory distress  [No Accessory Muscle Use] : no accessory muscle use [Clear to Auscultation] : lungs were clear to auscultation bilaterally [Normal Rate] : normal rate  [Regular Rhythm] : with a regular rhythm [Normal S1, S2] : normal S1 and S2 [No Murmur] : no murmur heard [No Edema] : there was no peripheral edema [Soft] : abdomen soft [Non Tender] : non-tender [Non-distended] : non-distended [No Masses] : no abdominal mass palpated [No HSM] : no HSM [Normal Bowel Sounds] : normal bowel sounds [No CVA Tenderness] : no CVA  tenderness [No Spinal Tenderness] : no spinal tenderness [No Joint Swelling] : no joint swelling [Grossly Normal Strength/Tone] : grossly normal strength/tone [Coordination Grossly Intact] : coordination grossly intact [No Focal Deficits] : no focal deficits [Normal Affect] : the affect was normal [Normal Insight/Judgement] : insight and judgment were intact [de-identified] : +pruritic, folliculitis like lesion on back of lateral thigh

## 2024-08-29 NOTE — PHYSICAL EXAM
[No Acute Distress] : no acute distress [Well Nourished] : well nourished [Well Developed] : well developed [Well-Appearing] : well-appearing [Normal Sclera/Conjunctiva] : normal sclera/conjunctiva [PERRL] : pupils equal round and reactive to light [EOMI] : extraocular movements intact [Normal Outer Ear/Nose] : the outer ears and nose were normal in appearance [Normal Oropharynx] : the oropharynx was normal [No JVD] : no jugular venous distention [No Lymphadenopathy] : no lymphadenopathy [Supple] : supple [Thyroid Normal, No Nodules] : the thyroid was normal and there were no nodules present [No Respiratory Distress] : no respiratory distress  [No Accessory Muscle Use] : no accessory muscle use [Clear to Auscultation] : lungs were clear to auscultation bilaterally [Normal Rate] : normal rate  [Regular Rhythm] : with a regular rhythm [Normal S1, S2] : normal S1 and S2 [No Murmur] : no murmur heard [No Edema] : there was no peripheral edema [Soft] : abdomen soft [Non Tender] : non-tender [Non-distended] : non-distended [No Masses] : no abdominal mass palpated [No HSM] : no HSM [Normal Bowel Sounds] : normal bowel sounds [No CVA Tenderness] : no CVA  tenderness [No Spinal Tenderness] : no spinal tenderness [No Joint Swelling] : no joint swelling [Grossly Normal Strength/Tone] : grossly normal strength/tone [Coordination Grossly Intact] : coordination grossly intact [No Focal Deficits] : no focal deficits [Normal Affect] : the affect was normal [Normal Insight/Judgement] : insight and judgment were intact [de-identified] : +pruritic, folliculitis like lesion on back of lateral thigh

## 2024-08-29 NOTE — HISTORY OF PRESENT ILLNESS
[FreeTextEntry1] : f/u [de-identified] : 49F PMH fibromyalgia, somatic symptom dx, anxiety, IBS, R benign breast mass p/f vulvar mass and blurry bision. Pt states that blurred vision has been worsening for 4 months. She wears glasses and notes difficulty reading and watching TV. She states that her whole vision is blurry and is not localized to specific area of her visual field. Denies any pain, no redness.   Pt also endorses vulvar lesion that she noticed 3 days ago after she shaved. Denies any pain, discharge. Denies sexual contacts, fevers or chills. Not pruritic. Pt also states similar lesion on her back lateral thigh.

## 2024-08-30 DIAGNOSIS — I10 ESSENTIAL (PRIMARY) HYPERTENSION: ICD-10-CM

## 2024-09-04 DIAGNOSIS — H52.4 PRESBYOPIA: ICD-10-CM

## 2024-09-04 DIAGNOSIS — N76.4 ABSCESS OF VULVA: ICD-10-CM

## 2024-09-04 DIAGNOSIS — L73.9 FOLLICULAR DISORDER, UNSPECIFIED: ICD-10-CM

## 2024-10-11 NOTE — HISTORY OF PRESENT ILLNESS
[Urinary Incontinence] : urinary incontinence [Urinary Urgency] : urinary urgency [Urinary Frequency] : urinary frequency [FreeTextEntry1] : 45 year old female with PMHx of Fibromyalgia, GERD, Left ovarian cyst s/p D&C, Hysteroscopy, Lap LSO with benign pathology, presents with 7 months of urinary urgency.  \benedicto First felt she had been urinating more than baseline around 5 years ago, but has been a problem persistently for 7 months.  Notably COVID + around the same time.\benedicto Has a severe urge to urinate and has to run to the bathroom or else she will leak a little\par No reported leakage with sneezing or coughing\benedicto Reports feeling like pins and needles are sticking her urethra and her vagina when urinating occasionally, and has felt like her labia are red and irritated when urinating but denies urine touching the labia during urination.\benedicto Still has persistent abdominal and gastrointestinal discomfort [Urinary Retention] : no urinary retention [Nocturia] : no nocturia [Straining] : no straining [Weak Stream] : no weak stream [Dysuria] : no dysuria [Hematuria - Gross] : no gross hematuria [Fever] : no fever [Fatigue] : no fatigue [Nausea] : no nausea Previously Declined (within the last year)

## 2025-01-07 NOTE — ASU PREOP CHECKLIST - ADDITIONAL CONSENTS
Airway  Urgency: elective    Date/Time: 1/7/2025 1:47 PM  Airway not difficult    General Information and Staff    Patient location during procedure: OR  Anesthesiologist: Zeinab Padgett MD    Indications and Patient Condition  Indications for airway management: airway protection and CNS depression    Preoxygenated: yes  Mask difficulty assessment: 1 - vent by mask    Final Airway Details  Final airway type: endotracheal airway      Successful airway: ETT  Cuffed: yes   Successful intubation technique: direct laryngoscopy  Facilitating devices/methods: intubating stylet and cricoid pressure  Endotracheal tube insertion site: oral  Blade: Alvarez  Blade size: 3  ETT size (mm): 8.5  Cormack-Lehane Classification: grade IIa - partial view of glottis  Placement verified by: chest auscultation and capnometry   Measured from: gums  ETT/EBT to gums (cm): 21  Number of attempts at approach: 2  Assessment: lips, teeth, and gum same as pre-op and atraumatic intubation            
rep consent

## 2025-02-28 NOTE — ASU PREOP CHECKLIST - PATIENT'S PERSONAL PROPERTY GIVEN TO
eMERGENCY dEPARTMENT eNCOUnter      CHIEF COMPLAINT    Chief Complaint   Patient presents with    Finger Pain       HPI    TAMMY Wheat is a 16 year old female who presents co pain to left middle finger since jamming it playing volleyball last night. Her friend went for the ball at the same time and she jammed her finger on her.  States she can still fully move it . She states she really only has pain when she moves it and declines analgesic  right now. Denies prior injury to this hand. left hand dominant.    ALLERGIES    ALLERGIES:  No Known Allergies    CURRENT MEDICATIONS    No current facility-administered medications for this encounter.     No current outpatient medications on file.       PAST MEDICAL HISTORY    No past medical history on file.    SURGICAL HISTORY    No past surgical history on file.    SOCIAL HISTORY    Social History     Substance Use Topics    Alcohol use: No    Drug use: No       FAMILY HISTORY    No family history on file.    REVIEW OF SYSTEMS      Constitutional:  Denies fever or chills.   Musculoskeletal:  See the history of present illness.  Integument:  Denies rash. Denies laceration and abrasion.  Neurologic:  Denies focal weakness or sensory changes.         PHYSICAL EXAM    ED Triage Vitals [02/28/25 0752]   /63   Heart Rate 99   Resp 16   Temp 97.6 °F (36.4 °C)   SpO2 99 %     Pulse Ox Interpretation:  99  Constitutional:  Well developed, well nourished. No acute distress, non-toxic appearance.   HENT:  Atraumatic. External ears normal. Nose normal. Oropharynx moist.  Respiratory:  No respiratory distress, normal breath sounds. No rales. No wheezing.   Cardiovascular:  Normal rate, normal rhythm. No murmurs, gallops, or rubs. Radial and DP pulses 2+ bilaterally. Capillary refill is 2 seconds.  Musculoskeletal:  left hand: no deformity. Diffuse mild swelling to left 3rd digit. No erythema/warmth. There is normal ROM. There is no bony tenderness.  Remainder hand /wrist non  tender.  Integument:  Well hydrated, no rash. No erythema or ecchymosis. No abrasions or lacerations.  Neurologic:  Alert & oriented x 3.  Normal motor function. Normal sensory function. No focal deficits noted.   Psychiatric:  Speech and behavior appropriate.         RADIOLOGY    XR HAND 3 OR MORE VIEWS LEFT   Final Result   Impression: Soft tissue swelling of the third finger without fracture or   malalignment of the finger or remainder of the left hand      Electronically Signed by: Xavier Daley M.D.   Signed on: 2/28/2025 8:21 AM   Created on Workstation ID: [id number]   Signed on Workstation ID: [id number]                ED COURSE & MEDICAL DECISION MAKING    VSS. NAD.  For pain control, patient may take IBU as advised.  May take ibuprofen 400 mg every 6-8 hrs with food as needed for pain.  Splint provided.    Discussed with patient ED findings and plan for discharge. Patient/family was given ED warnings, discharge instructions, and follow up information to go home with. Patient is to follow up with primary care provider in 7 days and return if symptoms are worsening or if any urgent concerns arise. Patient understands and agrees with plan for discharge. Any questions have been answered.          FINAL IMPRESSION    1. Sprain of left middle finger, unspecified site of digit, initial encounter           Ruth Castrejon PA-C  02/28/25 1141     on unit

## 2025-05-13 NOTE — ED ADULT NURSE NOTE - ISOLATION TYPE:
Ebglyss Counseling: I discussed with the patient the risks of lebrikizumab including but not limited to eye inflammation and irritation, cold sores, injection site reactions, allergic reactions and increased risk of parasitic infection. The patient understands that monitoring is required and they must alert us or the primary physician if symptoms of infection or other concerning signs are noted. Tetracycline Counseling: Patient counseled regarding possible photosensitivity and increased risk for sunburn.  Patient instructed to avoid sunlight, if possible.  When exposed to sunlight, patients should wear protective clothing, sunglasses, and sunscreen.  The patient was instructed to call the office immediately if the following severe adverse effects occur:  hearing changes, easy bruising/bleeding, severe headache, or vision changes.  The patient verbalized understanding of the proper use and possible adverse effects of tetracycline.  All of the patient's questions and concerns were addressed. Patient understands to avoid pregnancy while on therapy due to potential birth defects. Litfulo Counseling: I discussed with the patient the risks of Litfulo therapy including but not limited to upper respiratory tract infections, shingles, cold sores, and nausea. Live vaccines should be avoided.  This medication has been linked to serious infections; higher rate of mortality; malignancy and lymphoproliferative disorders; major adverse cardiovascular events; thrombosis; gastrointestinal perforations; neutropenia; lymphopenia; anemia; liver enzyme elevations; and lipid elevations. Cyclophosphamide Pregnancy And Lactation Text: This medication is Pregnancy Category D and it isn't considered safe during pregnancy. This medication is excreted in breast milk. Benzoyl Peroxide Pregnancy And Lactation Text: This medication is Pregnancy Category C. It is unknown if benzoyl peroxide is excreted in breast milk. Opzelura Pregnancy And Lactation Text: There is insufficient data to evaluate drug-associated risk for major birth defects, miscarriage, or other adverse maternal or fetal outcomes.  There is a pregnancy registry that monitors pregnancy outcomes in pregnant persons exposed to the medication during pregnancy.  It is unknown if this medication is excreted in breast milk.  Do not breastfeed during treatment and for about 4 weeks after the last dose. High Dose Vitamin A Pregnancy And Lactation Text: High dose vitamin A therapy is contraindicated during pregnancy and breast feeding. Oxybutynin Pregnancy And Lactation Text: This medication is Pregnancy Category B and is considered safe during pregnancy. It is unknown if it is excreted in breast milk. Simlandi Pregnancy And Lactation Text: This medication is Pregnancy Category B and is considered safe during pregnancy. It is unknown if this medication is excreted in breast milk. Cyclosporine Counseling:  I discussed with the patient the risks of cyclosporine including but not limited to hypertension, gingival hyperplasia,myelosuppression, immunosuppression, liver damage, kidney damage, neurotoxicity, lymphoma, and serious infections. The patient understands that monitoring is required including baseline blood pressure, CBC, CMP, lipid panel and uric acid, and then 1-2 times monthly CMP and blood pressure. Zepbound Counseling: I reviewed the possible side effects including: thyroid tumors, kidney disease, gallbladder disease, abdominal pain, constipation, diarrhea, nausea, vomiting and pancreatitis. Do not take this medication if you have a history or family history of multiple endocrine neoplasia syndrome type 2. Side effects reviewed, pt to contact office should one occur. Cephalexin Counseling: I counseled the patient regarding use of cephalexin as an antibiotic for prophylactic and/or therapeutic purposes. Cephalexin (commonly prescribed under brand name Keflex) is a cephalosporin antibiotic which is active against numerous classes of bacteria, including most skin bacteria. Side effects may include nausea, diarrhea, gastrointestinal upset, rash, hives, yeast infections, and in rare cases, hepatitis, kidney disease, seizures, fever, confusion, neurologic symptoms, and others. Patients with severe allergies to penicillin medications are cautioned that there is about a 10% incidence of cross-reactivity with cephalosporins. When possible, patients with penicillin allergies should use alternatives to cephalosporins for antibiotic therapy. Topical Ketoconazole Counseling: Patient counseled that this medication may cause skin irritation or allergic reactions.  In the event of skin irritation, the patient was advised to reduce the amount of the drug applied or use it less frequently.   The patient verbalized understanding of the proper use and possible adverse effects of ketoconazole.  All of the patient's questions and concerns were addressed. Sotyktu Pregnancy And Lactation Text: There is insufficient data to evaluate whether or not Sotyktu is safe to use during pregnancy.   It is not known if Sotyktu passes into breast milk and whether or not it is safe to use when breastfeeding.   Litfulo Pregnancy And Lactation Text: Based on animal studies, Lifulo may cause embryo-fetal harm when administered to pregnant women.  The medication should not be used in pregnancy.  Breastfeeding is not recommended during treatment. Cephalexin Pregnancy And Lactation Text: This medication is Pregnancy Category B and considered safe during pregnancy.  It is also excreted in breast milk but can be used safely for shorter doses. Zepbound Pregnancy And Lactation Text: The fetal risk of this medication is unknown and taking while pregnant is not recommended. It is unknown if this medication is present in breast milk. Carac Counseling:  I discussed with the patient the risks of Carac including but not limited to erythema, scaling, itching, weeping, crusting, and pain. Tetracycline Pregnancy And Lactation Text: This medication is Pregnancy Category D and not consider safe during pregnancy. It is also excreted in breast milk. Picato Counseling:  I discussed with the patient the risks of Picato including but not limited to erythema, scaling, itching, weeping, crusting, and pain. Ebglyss Pregnancy And Lactation Text: This medication likely crosses the placenta but the risk for the fetus is uncertain. It is unknown if this medication is excreted in breast milk. Picato Pregnancy And Lactation Text: This medication is Pregnancy Category C. It is unknown if this medication is excreted in breast milk. Cyclosporine Pregnancy And Lactation Text: This medication is Pregnancy Category C and it isn't know if it is safe during pregnancy. This medication is excreted in breast milk. Enbrel Counseling:  I discussed with the patient the risks of etanercept including but not limited to myelosuppression, immunosuppression, autoimmune hepatitis, demyelinating diseases, lymphoma, and infections.  The patient understands that monitoring is required including a PPD at baseline and must alert us or the primary physician if symptoms of infection or other concerning signs are noted. Carac Pregnancy And Lactation Text: This medication is Pregnancy Category X and contraindicated in pregnancy and in women who may become pregnant. It is unknown if this medication is excreted in breast milk. Propranolol Counseling:  I discussed with the patient the risks of propranolol including but not limited to low heart rate, low blood pressure, low blood sugar, restlessness and increased cold sensitivity. They should call the office if they experience any of these side effects. Albendazole Counseling:  I discussed with the patient the risks of albendazole including but not limited to cytopenia, kidney damage, nausea/vomiting and severe allergy.  The patient understands that this medication is being used in an off-label manner. Simponi Counseling:  I discussed with the patient the risks of golimumab including but not limited to myelosuppression, immunosuppression, autoimmune hepatitis, demyelinating diseases, lymphoma, and serious infections.  The patient understands that monitoring is required including a PPD at baseline and must alert us or the primary physician if symptoms of infection or other concerning signs are noted. Topical Metronidazole Counseling: Metronidazole is a topical antibiotic medication. You may experience burning, stinging, redness, or allergic reactions.  Please call our office if you develop any problems from using this medication. Albendazole Pregnancy And Lactation Text: This medication is Pregnancy Category C and it isn't known if it is safe during pregnancy. It is also excreted in breast milk. Simponi Pregnancy And Lactation Text: The risk during pregnancy and breastfeeding is uncertain with this medication. Clindamycin Counseling: I counseled the patient regarding use of clindamycin as an antibiotic for prophylactic and/or therapeutic purposes. Clindamycin is active against numerous classes of bacteria, including skin bacteria. Side effects may include nausea, diarrhea, gastrointestinal upset, rash, hives, yeast infections, and in rare cases, colitis. None Propranolol Pregnancy And Lactation Text: This medication is Pregnancy Category C and it isn't known if it is safe during pregnancy. It is excreted in breast milk. Olumiant Counseling: I discussed with the patient the risks of Olumiant therapy including but not limited to upper respiratory tract infections, shingles, cold sores, and nausea. Live vaccines should be avoided.  This medication has been linked to serious infections; higher rate of mortality; malignancy and lymphoproliferative disorders; major adverse cardiovascular events; thrombosis; gastrointestinal perforations; neutropenia; lymphopenia; anemia; liver enzyme elevations; and lipid elevations. Fluconazole Counseling:  Patient counseled regarding adverse effects of fluconazole including but not limited to headache, diarrhea, nausea, upset stomach, liver function test abnormalities, taste disturbance, and stomach pain.  There is a rare possibility of liver failure that can occur when taking fluconazole.  The patient understands that monitoring of LFTs and kidney function test may be required, especially at baseline. The patient verbalized understanding of the proper use and possible adverse effects of fluconazole.  All of the patient's questions and concerns were addressed. Olumiant Pregnancy And Lactation Text: Based on animal studies, Olumiant may cause embryo-fetal harm when administered to pregnant women.  The medication should not be used in pregnancy.  Breastfeeding is not recommended during treatment. Calcipotriene Counseling:  I discussed with the patient the risks of calcipotriene including but not limited to erythema, scaling, itching, and irritation. Arava Counseling:  Patient counseled regarding adverse effects of Arava including but not limited to nausea, vomiting, abnormalities in liver function tests. Patients may develop mouth sores, rash, diarrhea, and abnormalities in blood counts. The patient understands that monitoring is required including LFTs and blood counts.  There is a rare possibility of scarring of the liver and lung problems that can occur when taking methotrexate. Persistent nausea, loss of appetite, pale stools, dark urine, cough, and shortness of breath should be reported immediately. Patient advised to discontinue Arava treatment and consult with a physician prior to attempting conception. The patient will have to undergo a treatment to eliminate Arava from the body prior to conception. Protopic Counseling: Patient may experience a mild burning sensation during topical application. Protopic is not approved in children less than 2 years of age. There have been case reports of hematologic and skin malignancies in patients using topical calcineurin inhibitors although causality is questionable. Methotrexate Counseling:  Patient counseled regarding adverse effects of methotrexate including but not limited to nausea, vomiting, abnormalities in liver function tests. Patients may develop mouth sores, rash, diarrhea, and abnormalities in blood counts. The patient understands that monitoring is required including LFT's and blood counts.  There is a rare possibility of scarring of the liver and lung problems that can occur when taking methotrexate. Persistent nausea, loss of appetite, pale stools, dark urine, cough, and shortness of breath should be reported immediately. Patient advised to discontinue methotrexate treatment at least three months before attempting to become pregnant.  I discussed the need for folate supplements while taking methotrexate.  These supplements can decrease side effects during methotrexate treatment. The patient verbalized understanding of the proper use and possible adverse effects of methotrexate.  All of the patient's questions and concerns were addressed. Clindamycin Pregnancy And Lactation Text: This medication can be used in pregnancy if certain situations. Clindamycin is also present in breast milk. Skyrizi Counseling: I discussed with the patient the risks of risankizumab-rzaa including but not limited to immunosuppression, and serious infections.  The patient understands that monitoring is required including a PPD at baseline and must alert us or the primary physician if symptoms of infection or other concerning signs are noted. Ivermectin Counseling:  Patient instructed to take medication on an empty stomach with a full glass of water.  Patient informed of potential adverse effects including but not limited to nausea, diarrhea, dizziness, itching, and swelling of the extremities or lymph nodes.  The patient verbalized understanding of the proper use and possible adverse effects of ivermectin.  All of the patient's questions and concerns were addressed. SSKI Counseling:  I discussed with the patient the risks of SSKI including but not limited to thyroid abnormalities, metallic taste, GI upset, fever, headache, acne, arthralgias, paraesthesias, lymphadenopathy, easy bleeding, arrhythmias, and allergic reaction. Topical Metronidazole Pregnancy And Lactation Text: This medication is Pregnancy Category B and considered safe during pregnancy.  It is also considered safe to use while breastfeeding. Rinvoq Counseling: I discussed with the patient the risks of Rinvoq therapy including but not limited to upper respiratory tract infections, shingles, cold sores, bronchitis, nausea, cough, fever, acne, and headache. Live vaccines should be avoided.  This medication has been linked to serious infections; higher rate of mortality; malignancy and lymphoproliferative disorders; major adverse cardiovascular events; thrombosis; thrombocytopenia, anemia, and neutropenia; lipid elevations; liver enzyme elevations; and gastrointestinal perforations. Humira Counseling:  I discussed with the patient the risks of adalimumab including but not limited to myelosuppression, immunosuppression, autoimmune hepatitis, demyelinating diseases, lymphoma, and serious infections.  The patient understands that monitoring is required including a PPD at baseline and must alert us or the primary physician if symptoms of infection or other concerning signs are noted. Fluconazole Pregnancy And Lactation Text: This medication is Pregnancy Category C and it isn't know if it is safe during pregnancy. It is also excreted in breast milk. Dutasteride Male Counseling: Dustasteride Counseling:  I discussed with the patient the risks of use of dutasteride including but not limited to decreased libido, decreased ejaculate volume, and gynecomastia. Women who can become pregnant should not handle medication.  All of the patient's questions and concerns were addressed. Methotrexate Pregnancy And Lactation Text: This medication is Pregnancy Category X and is known to cause fetal harm. This medication is excreted in breast milk. Arava Pregnancy And Lactation Text: This medication is Pregnancy Category X and is absolutely contraindicated during pregnancy. It is unknown if it is excreted in breast milk. Protopic Pregnancy And Lactation Text: This medication is Pregnancy Category C. It is unknown if this medication is excreted in breast milk when applied topically. Calcipotriene Pregnancy And Lactation Text: The use of this medication during pregnancy or lactation is not recommended as there is insufficient data. Oral Minoxidil Counseling- I discussed with the patient the risks of oral minoxidil including but not limited to shortness of breath, swelling of the feet or ankles, dizziness, lightheadedness, unwanted hair growth and allergic reaction.  The patient verbalized understanding of the proper use and possible adverse effects of oral minoxidil.  All of the patient's questions and concerns were addressed. Azathioprine Pregnancy And Lactation Text: This medication is Pregnancy Category D and isn't considered safe during pregnancy. It is unknown if this medication is excreted in breast milk. Rituxan Counseling:  I discussed with the patient the risks of Rituxan infusions. Side effects can include infusion reactions, severe drug rashes including mucocutaneous reactions, reactivation of latent hepatitis and other infections and rarely progressive multifocal leukoencephalopathy.  All of the patient's questions and concerns were addressed. Glycopyrrolate Pregnancy And Lactation Text: This medication is Pregnancy Category B and is considered safe during pregnancy. It is unknown if it is excreted breast milk. Spironolactone Pregnancy And Lactation Text: This medication can cause feminization of the male fetus and should be avoided during pregnancy. The active metabolite is also found in breast milk. Cosentyx Counseling:  I discussed with the patient the risks of Cosentyx including but not limited to worsening of Crohn's disease, immunosuppression, allergic reactions and infections.  The patient understands that monitoring is required including a PPD at baseline and must alert us or the primary physician if symptoms of infection or other concerning signs are noted. Xolair Pregnancy And Lactation Text: This medication is Pregnancy Category B and is considered safe during pregnancy. This medication is excreted in breast milk. Rifampin Counseling: I discussed with the patient the risks of rifampin including but not limited to liver damage, kidney damage, red-orange body fluids, nausea/vomiting and severe allergy. Semaglutide Counseling: I reviewed the possible side effects including: thyroid tumors, kidney disease, gallbladder disease, abdominal pain, constipation, diarrhea, nausea, vomiting and pancreatitis. Do not take this medication if you have a history or family history of multiple endocrine neoplasia syndrome type 2. Side effects reviewed, pt to contact office should one occur. Hydroquinone Counseling:  Patient advised that medication may result in skin irritation, lightening (hypopigmentation), dryness, and burning.  In the event of skin irritation, the patient was advised to reduce the amount of the drug applied or use it less frequently.  Rarely, spots that are treated with hydroquinone can become darker (pseudoochronosis).  Should this occur, patient instructed to stop medication and call the office. The patient verbalized understanding of the proper use and possible adverse effects of hydroquinone.  All of the patient's questions and concerns were addressed. Aklief Pregnancy And Lactation Text: It is unknown if this medication is safe to use during pregnancy.  It is unknown if this medication is excreted in breast milk.  Breastfeeding women should use the topical cream on the smallest area of the skin for the shortest time needed while breastfeeding.  Do not apply to nipple and areola. Bexarotene Pregnancy And Lactation Text: This medication is Pregnancy Category X and should not be given to women who are pregnant or may become pregnant. This medication should not be used if you are breast feeding. Zoryve Counseling:  I discussed with the patient that Zoryve is not for use in the eyes, mouth or vagina. The most commonly reported side effects include diarrhea, headache, insomnia, application site pain, upper respiratory tract infections, and urinary tract infections.  All of the patient's questions and concerns were addressed. Minoxidil Pregnancy And Lactation Text: This medication has not been assigned a Pregnancy Risk Category but animal studies failed to show danger with the topical medication. It is unknown if the medication is excreted in breast milk. Rituxan Pregnancy And Lactation Text: This medication is Pregnancy Category C and it isn't know if it is safe during pregnancy. It is unknown if this medication is excreted in breast milk but similar antibodies are known to be excreted. Cellcept Counseling:  I discussed with the patient the risks of mycophenolate mofetil including but not limited to infection/immunosuppression, GI upset, hypokalemia, hypercholesterolemia, bone marrow suppression, lymphoproliferative disorders, malignancy, GI ulceration/bleed/perforation, colitis, interstitial lung disease, kidney failure, progressive multifocal leukoencephalopathy, and birth defects.  The patient understands that monitoring is required including a baseline creatinine and regular CBC testing. In addition, patient must alert us immediately if symptoms of infection or other concerning signs are noted. Isotretinoin Counseling: Patient should get monthly blood tests, not donate blood, not drive at night if vision affected, not share medication, and not undergo elective surgery for 6 months after tx completed. Side effects reviewed, pt to contact office should one occur. Hydroxychloroquine Counseling:  I discussed with the patient that a baseline ophthalmologic exam is needed at the start of therapy and every year thereafter while on therapy. A CBC may also be warranted for monitoring.  The side effects of this medication were discussed with the patient, including but not limited to agranulocytosis, aplastic anemia, seizures, rashes, retinopathy, and liver toxicity. Patient instructed to call the office should any adverse effect occur.  The patient verbalized understanding of the proper use and possible adverse effects of Plaquenil.  All the patient's questions and concerns were addressed. Azithromycin Counseling:  I discussed with the patient the risks of azithromycin including but not limited to GI upset, allergic reaction, drug rash, diarrhea, and yeast infections. Oral Minoxidil Pregnancy And Lactation Text: This medication should only be used when clearly needed if you are pregnant, attempting to become pregnant or breast feeding. Tazorac Counseling:  Patient advised that medication is irritating and drying.  Patient may need to apply sparingly and wash off after an hour before eventually leaving it on overnight.  The patient verbalized understanding of the proper use and possible adverse effects of tazorac.  All of the patient's questions and concerns were addressed. Cantharidin Pregnancy And Lactation Text: This medication has not been proven safe during pregnancy. It is unknown if this medication is excreted in breast milk. Rifampin Pregnancy And Lactation Text: This medication is Pregnancy Category C and it isn't know if it is safe during pregnancy. It is also excreted in breast milk and should not be used if you are breast feeding. Mirvaso Counseling: Mirvaso is a topical medication which can decrease superficial blood flow where applied. Side effects are uncommon and include stinging, redness and allergic reactions. Azelaic Acid Counseling: Patient counseled that medicine may cause skin irritation and to avoid applying near the eyes.  In the event of skin irritation, the patient was advised to reduce the amount of the drug applied or use it less frequently.   The patient verbalized understanding of the proper use and possible adverse effects of azelaic acid.  All of the patient's questions and concerns were addressed. Zoryve Pregnancy And Lactation Text: It is unknown if this medication can cause problems during pregnancy and breastfeeding. Mirvaso Pregnancy And Lactation Text: This medication has not been assigned a Pregnancy Risk Category. It is unknown if the medication is excreted in breast milk. Isotretinoin Pregnancy And Lactation Text: This medication is Pregnancy Category X and is considered extremely dangerous during pregnancy. It is unknown if it is excreted in breast milk. Sarecycline Counseling: Patient advised regarding possible photosensitivity and discoloration of the teeth, skin, lips, tongue and gums.  Patient instructed to avoid sunlight, if possible.  When exposed to sunlight, patients should wear protective clothing, sunglasses, and sunscreen.  The patient was instructed to call the office immediately if the following severe adverse effects occur:  hearing changes, easy bruising/bleeding, severe headache, or vision changes.  The patient verbalized understanding of the proper use and possible adverse effects of sarecycline.  All of the patient's questions and concerns were addressed. Azelaic Acid Pregnancy And Lactation Text: This medication is considered safe during pregnancy and breast feeding. Otezla Counseling: The side effects of Otezla were discussed with the patient, including but not limited to worsening or new depression, weight loss, diarrhea, nausea, upper respiratory tract infection, and headache. Patient instructed to call the office should any adverse effect occur.  The patient verbalized understanding of the proper use and possible adverse effects of Otezla.  All the patient's questions and concerns were addressed. Azithromycin Pregnancy And Lactation Text: This medication is considered safe during pregnancy and is also secreted in breast milk. Tazorac Pregnancy And Lactation Text: This medication is not safe during pregnancy. It is unknown if this medication is excreted in breast milk. Hydroxychloroquine Pregnancy And Lactation Text: This medication has been shown to cause fetal harm but it isn't assigned a Pregnancy Risk Category. There are small amounts excreted in breast milk. Siliq Counseling:  I discussed with the patient the risks of Siliq including but not limited to new or worsening depression, suicidal thoughts and behavior, immunosuppression, malignancy, posterior leukoencephalopathy syndrome, and serious infections.  The patient understands that monitoring is required including a PPD at baseline and must alert us or the primary physician if symptoms of infection or other concerning signs are noted. There is also a special program designed to monitor depression which is required with Siliq. Topical Clindamycin Counseling: Patient counseled that this medication may cause skin irritation or allergic reactions.  In the event of skin irritation, the patient was advised to reduce the amount of the drug applied or use it less frequently.   The patient verbalized understanding of the proper use and possible adverse effects of clindamycin.  All of the patient's questions and concerns were addressed. Wegovy Counseling: I reviewed the possible side effects including: thyroid tumors, kidney disease, gallbladder disease, abdominal pain, constipation, diarrhea, nausea, vomiting and pancreatitis. Do not take this medication if you have a history or family history of multiple endocrine neoplasia syndrome type 2. Side effects reviewed, pt to contact office should one occur. Bactrim Counseling:  I discussed with the patient the risks of sulfa antibiotics including but not limited to GI upset, allergic reaction, drug rash, diarrhea, dizziness, photosensitivity, and yeast infections.  Rarely, more serious reactions can occur including but not limited to aplastic anemia, agranulocytosis, methemoglobinemia, blood dyscrasias, liver or kidney failure, lung infiltrates or desquamative/blistering drug rashes. Otezla Pregnancy And Lactation Text: This medication is Pregnancy Category C and it isn't known if it is safe during pregnancy. It is unknown if it is excreted in breast milk. Cibinqo Counseling: I discussed with the patient the risks of Cibinqo therapy including but not limited to common cold, nausea, headache, cold sores, increased blood CPK levels, dizziness, UTIs, fatigue, acne, and vomitting. Live vaccines should be avoided.  This medication has been linked to serious infections; higher rate of mortality; malignancy and lymphoproliferative disorders; major adverse cardiovascular events; thrombosis; thrombocytopenia and lymphopenia; lipid elevations; and retinal detachment. Zyclara Counseling:  I discussed with the patient the risks of imiquimod including but not limited to erythema, scaling, itching, weeping, crusting, and pain.  Patient understands that the inflammatory response to imiquimod is variable from person to person and was educated regarded proper titration schedule.  If flu-like symptoms develop, patient knows to discontinue the medication and contact us. Dupixent Counseling: I discussed with the patient the risks of dupilumab including but not limited to eye infection and irritation, cold sores, injection site reactions, worsening of asthma, allergic reactions and increased risk of parasitic infection.  Live vaccines should be avoided while taking dupilumab. Dupilumab will also interact with certain medications such as warfarin and cyclosporine. The patient understands that monitoring is required and they must alert us or the primary physician if symptoms of infection or other concerning signs are noted. Opzelura Counseling:  I discussed with the patient the risks of Opzelura including but not limited to nasopharngitis, bronchitis, ear infection, eosinophila, hives, diarrhea, folliculitis, tonsillitis, and rhinorrhea.  Taken orally, this medication has been linked to serious infections; higher rate of mortality; malignancy and lymphoproliferative disorders; major adverse cardiovascular events; thrombosis; thrombocytopenia, anemia, and neutropenia; and lipid elevations. Dupixent Pregnancy And Lactation Text: This medication likely crosses the placenta but the risk for the fetus is uncertain. This medication is excreted in breast milk. Cibinqo Pregnancy And Lactation Text: It is unknown if this medication will adversely affect pregnancy or breast feeding.  You should not take this medication if you are currently pregnant or planning a pregnancy or while breastfeeding. Benzoyl Peroxide Counseling: Patient counseled that medicine may cause skin irritation and bleach clothing.  In the event of skin irritation, the patient was advised to reduce the amount of the drug applied or use it less frequently.   The patient verbalized understanding of the proper use and possible adverse effects of benzoyl peroxide.  All of the patient's questions and concerns were addressed. High Dose Vitamin A Counseling: Side effects reviewed, pt to contact office should one occur. Cyclophosphamide Counseling:  I discussed with the patient the risks of cyclophosphamide including but not limited to hair loss, hormonal abnormalities, decreased fertility, abdominal pain, diarrhea, nausea and vomiting, bone marrow suppression and infection. The patient understands that monitoring is required while taking this medication. Bactrim Pregnancy And Lactation Text: This medication is Pregnancy Category D and is known to cause fetal risk.  It is also excreted in breast milk. Oxybutynin Counseling:  I discussed with the patient the risks of oxybutynin including but not limited to skin rash, drowsiness, dry mouth, difficulty urinating, and blurred vision. Simlandi Counseling:  I discussed with the patient the risks of adalimumab including but not limited to myelosuppression, immunosuppression, autoimmune hepatitis, demyelinating diseases, lymphoma, and serious infections.  The patient understands that monitoring is required including a PPD at baseline and must alert us or the primary physician if symptoms of infection or other concerning signs are noted. Sotyktu Counseling:  I discussed the most common side effects of Sotyktu including: common cold, sore throat, sinus infections, cold sores, canker sores, folliculitis, and acne.  I also discussed more serious side effects of Sotyktu including but not limited to: serious allergic reactions; increased risk for infections such as TB; cancers such as lymphomas; rhabdomyolysis and elevated CPK; and elevated triglycerides and liver enzymes.  Imiquimod Counseling:  I discussed with the patient the risks of imiquimod including but not limited to erythema, scaling, itching, weeping, crusting, and pain.  Patient understands that the inflammatory response to imiquimod is variable from person to person and was educated regarded proper titration schedule.  If flu-like symptoms develop, patient knows to discontinue the medication and contact us. Metronidazole Pregnancy And Lactation Text: This medication is Pregnancy Category B and considered safe during pregnancy.  It is also excreted in breast milk. Adbry Pregnancy And Lactation Text: It is unknown if this medication will adversely affect pregnancy or breast feeding. Taltz Counseling: I discussed with the patient the risks of ixekizumab including but not limited to immunosuppression, serious infections, worsening of inflammatory bowel disease and drug reactions.  The patient understands that monitoring is required including a PPD at baseline and must alert us or the primary physician if symptoms of infection or other concerning signs are noted. Hydroxyzine Pregnancy And Lactation Text: This medication is not safe during pregnancy and should not be taken. It is also excreted in breast milk and breast feeding isn't recommended. Finasteride Pregnancy And Lactation Text: This medication is absolutely contraindicated during pregnancy. It is unknown if it is excreted in breast milk. Tranexamic Acid Pregnancy And Lactation Text: It is unknown if this medication is safe during pregnancy or breast feeding. Solaraze Counseling:  I discussed with the patient the risks of Solaraze including but not limited to erythema, scaling, itching, weeping, crusting, and pain. Dapsone Counseling: I discussed with the patient the risks of dapsone including but not limited to hemolytic anemia, agranulocytosis, rashes, methemoglobinemia, kidney failure, peripheral neuropathy, headaches, GI upset, and liver toxicity.  Patients who start dapsone require monitoring including baseline LFTs and weekly CBCs for the first month, then every month thereafter.  The patient verbalized understanding of the proper use and possible adverse effects of dapsone.  All of the patient's questions and concerns were addressed. Ketoconazole Counseling:   Patient counseled regarding improving absorption with orange juice.  Adverse effects include but are not limited to breast enlargement, headache, diarrhea, nausea, upset stomach, liver function test abnormalities, taste disturbance, and stomach pain.  There is a rare possibility of liver failure that can occur when taking ketoconazole. The patient understands that monitoring of LFTs may be required, especially at baseline. The patient verbalized understanding of the proper use and possible adverse effects of ketoconazole.  All of the patient's questions and concerns were addressed. Solaraze Pregnancy And Lactation Text: This medication is Pregnancy Category B and is considered safe. There is some data to suggest avoiding during the third trimester. It is unknown if this medication is excreted in breast milk. Valtrex Counseling: I discussed with the patient the risks of valacyclovir including but not limited to kidney damage, nausea, vomiting and severe allergy.  The patient understands that if the infection seems to be worsening or is not improving, they are to call. Infliximab Counseling:  I discussed with the patient the risks of infliximab including but not limited to myelosuppression, immunosuppression, autoimmune hepatitis, demyelinating diseases, lymphoma, and serious infections.  The patient understands that monitoring is required including a PPD at baseline and must alert us or the primary physician if symptoms of infection or other concerning signs are noted. Ketoconazole Pregnancy And Lactation Text: This medication is Pregnancy Category C and it isn't know if it is safe during pregnancy. It is also excreted in breast milk and breast feeding isn't recommended. Dapsone Pregnancy And Lactation Text: This medication is Pregnancy Category C and is not considered safe during pregnancy or breast feeding. Nsaids Counseling: NSAID Counseling: I discussed with the patient that NSAIDs should be taken with food. Prolonged use of NSAIDs can result in the development of stomach ulcers.  Patient advised to stop taking NSAIDs if abdominal pain occurs.  The patient verbalized understanding of the proper use and possible adverse effects of NSAIDs.  All of the patient's questions and concerns were addressed. Libtayo Pregnancy And Lactation Text: This medication is contraindicated in pregnancy and when breast feeding. Opioid Counseling: I discussed with the patient the potential side effects of opioids including but not limited to addiction, altered mental status, and depression. I stressed avoiding alcohol, benzodiazepines, muscle relaxants and sleep aids unless specifically okayed by a physician. The patient verbalized understanding of the proper use and possible adverse effects of opioids. All of the patient's questions and concerns were addressed. They were instructed to flush the remaining pills down the toilet if they did not need them for pain. Winlevi Counseling:  I discussed with the patient the risks of topical clascoterone including but not limited to erythema, scaling, itching, and stinging. Patient voiced their understanding. Minocycline Counseling: Patient advised regarding possible photosensitivity and discoloration of the teeth, skin, lips, tongue and gums.  Patient instructed to avoid sunlight, if possible.  When exposed to sunlight, patients should wear protective clothing, sunglasses, and sunscreen.  The patient was instructed to call the office immediately if the following severe adverse effects occur:  hearing changes, easy bruising/bleeding, severe headache, or vision changes.  The patient verbalized understanding of the proper use and possible adverse effects of minocycline.  All of the patient's questions and concerns were addressed. Bimzelx Counseling:  I discussed with the patient the risks of Bimzelx including but not limited to depression, immunosuppression, allergic reactions and infections.  The patient understands that monitoring is required including a PPD at baseline and must alert us or the primary physician if symptoms of infection or other concerning signs are noted. Ozempic Counseling: I reviewed the possible side effects including: thyroid tumors, kidney disease, gallbladder disease, abdominal pain, constipation, diarrhea, nausea, vomiting and pancreatitis. Do not take this medication if you have a history or family history of multiple endocrine neoplasia syndrome type 2. Side effects reviewed, pt to contact office should one occur. Birth Control Pills Counseling: Birth Control Pill Counseling: I discussed with the patient the potential side effects of OCPs including but not limited to increased risk of stroke, heart attack, thrombophlebitis, deep venous thrombosis, hepatic adenomas, breast changes, GI upset, headaches, and depression.  The patient verbalized understanding of the proper use and possible adverse effects of OCPs. All of the patient's questions and concerns were addressed. Elidel Counseling: Patient may experience a mild burning sensation during topical application. Elidel is not approved in children less than 2 years of age. There have been case reports of hematologic and skin malignancies in patients using topical calcineurin inhibitors although causality is questionable. Terbinafine Counseling: Patient counseling regarding adverse effects of terbinafine including but not limited to headache, diarrhea, rash, upset stomach, liver function test abnormalities, itching, taste/smell disturbance, nausea, abdominal pain, and flatulence.  There is a rare possibility of liver failure that can occur when taking terbinafine.  The patient understands that a baseline LFT and kidney function test may be required. The patient verbalized understanding of the proper use and possible adverse effects of terbinafine.  All of the patient's questions and concerns were addressed. Klisyri Counseling:  I discussed with the patient the risks of Klisyri including but not limited to erythema, scaling, itching, weeping, crusting, and pain. Acitretin Counseling:  I discussed with the patient the risks of acitretin including but not limited to hair loss, dry lips/skin/eyes, liver damage, hyperlipidemia, depression/suicidal ideation, photosensitivity.  Serious rare side effects can include but are not limited to pancreatitis, pseudotumor cerebri, bony changes, clot formation/stroke/heart attack.  Patient understands that alcohol is contraindicated since it can result in liver toxicity and significantly prolong the elimination of the drug by many years. Opioid Pregnancy And Lactation Text: These medications can lead to premature delivery and should be avoided during pregnancy. These medications are also present in breast milk in small amounts. Gabapentin Counseling: I discussed with the patient the risks of gabapentin including but not limited to dizziness, somnolence, fatigue and ataxia. Nsaids Pregnancy And Lactation Text: These medications are considered safe up to 30 weeks gestation. It is excreted in breast milk. Detail Level: Simple Odomzo Counseling- I discussed with the patient the risks of Odomzo including but not limited to nausea, vomiting, diarrhea, constipation, weight loss, changes in the sense of taste, decreased appetite, muscle spasms, and hair loss.  The patient verbalized understanding of the proper use and possible adverse effects of Odomzo.  All of the patient's questions and concerns were addressed. Valtrex Pregnancy And Lactation Text: this medication is Pregnancy Category B and is considered safe during pregnancy. This medication is not directly found in breast milk but it's metabolite acyclovir is present. Soolantra Counseling: I discussed with the patients the risks of topial Soolantra. This is a medicine which decreases the number of mites and inflammation in the skin. You experience burning, stinging, eye irritation or allergic reactions.  Please call our office if you develop any problems from using this medication. Tremfya Counseling: I discussed with the patient the risks of guselkumab including but not limited to immunosuppression, serious infections, and drug reactions.  The patient understands that monitoring is required including a PPD at baseline and must alert us or the primary physician if symptoms of infection or other concerning signs are noted. Bimzelx Pregnancy And Lactation Text: This medication crosses the placenta and the safety is uncertain during pregnancy. It is unknown if this medication is present in breast milk. Olanzapine Counseling- I discussed with the patient the common side effects of olanzapine including but are not limited to: lack of energy, dry mouth, increased appetite, sleepiness, tremor, constipation, dizziness, changes in behavior, or restlessness.  Explained that teenagers are more likely to experience headaches, abdominal pain, pain in the arms or legs, tiredness, and sleepiness.  Serious side effects include but are not limited: increased risk of death in elderly patients who are confused, have memory loss, or dementia-related psychosis; hyperglycemia; increased cholesterol and triglycerides; and weight gain. Use Enhanced Medication Counseling?: No Birth Control Pills Pregnancy And Lactation Text: This medication should be avoided if pregnant and for the first 30 days post-partum. Winlevi Pregnancy And Lactation Text: This medication is considered safe during pregnancy and breastfeeding. Acitretin Pregnancy And Lactation Text: This medication is Pregnancy Category X and should not be given to women who are pregnant or may become pregnant in the future. This medication is excreted in breast milk. Terbinafine Pregnancy And Lactation Text: This medication is Pregnancy Category B and is considered safe during pregnancy. It is also excreted in breast milk and breast feeding isn't recommended. Eucrisa Counseling: Patient may experience a mild burning sensation during topical application. Eucrisa is not approved in children less than 2 years of age. Include Pregnancy/Lactation Warning?: Add Automatically Based on Childbearing Potential and Patient Age Gabapentin Pregnancy And Lactation Text: This medication is Pregnancy Category C and isn't considered safe during pregnancy. It is excreted in breast milk. Soolantra Pregnancy And Lactation Text: This medication is Pregnancy Category C. This medication is considered safe during breast feeding. Nemluvio Counseling: I discussed with the patient the risks of nemolizumab including but not limited to headache, gastrointestinal complaints, nasopharyngitis, musculoskeletal complaints, injection site reactions, and allergic reactions. The patient understands that monitoring is required and they must alert us or the primary physician if any side effects are noted. Topical Retinoid counseling:  Patient advised to apply a pea-sized amount only at bedtime and wait 30 minutes after washing their face before applying.  If too drying, patient may add a non-comedogenic moisturizer. The patient verbalized understanding of the proper use and possible adverse effects of retinoids.  All of the patient's questions and concerns were addressed. Saxenda Counseling: I reviewed the possible side effects including: thyroid tumors, kidney disease, gallbladder disease, abdominal pain, constipation, diarrhea, nausea, vomiting and pancreatitis. Do not take this medication if you have a history or family history of multiple endocrine neoplasia syndrome type 2. Side effects reviewed, pt to contact office should one occur. Nemluvio Pregnancy And Lactation Text: It is not known if Nemluvio causes fetal harm or is present in breast milk. Please proceed with caution if patients who are pregnant or breastfeeding. VTAMA Counseling: I discussed with the patient that VTAMA is not for use in the eyes, mouth or mouth. They should call the office if they develop any signs of allergic reactions to VTAMA. The patient verbalized understanding of the proper use and possible adverse effects of VTAMA.  All of the patient's questions and concerns were addressed. Spironolactone Counseling: Patient advised regarding risks of diarrhea, abdominal pain, hyperkalemia, birth defects (for female patients), liver toxicity and renal toxicity. The patient may need blood work to monitor liver and kidney function and potassium levels while on therapy. The patient verbalized understanding of the proper use and possible adverse effects of spironolactone.  All of the patient's questions and concerns were addressed. Klisyri Pregnancy And Lactation Text: It is unknown if this medication can harm a developing fetus or if it is excreted in breast milk. Quinolones Counseling:  I discussed with the patient the risks of fluoroquinolones including but not limited to GI upset, allergic reaction, drug rash, diarrhea, dizziness, photosensitivity, yeast infections, liver function test abnormalities, tendonitis/tendon rupture. Olanzapine Pregnancy And Lactation Text: This medication is pregnancy category C.   There are no adequate and well controlled trials with olanzapine in pregnant females.  Olanzapine should be used during pregnancy only if the potential benefit justifies the potential risk to the fetus.   In a study in lactating healthy women, olanzapine was excreted in breast milk.  It is recommended that women taking olanzapine should not breast feed. Cimzia Counseling:  I discussed with the patient the risks of Cimzia including but not limited to immunosuppression, allergic reactions and infections.  The patient understands that monitoring is required including a PPD at baseline and must alert us or the primary physician if symptoms of infection or other concerning signs are noted. Minoxidil Counseling: Minoxidil is a topical medication which can increase blood flow where it is applied. It is uncertain how this medication increases hair growth. Side effects are uncommon and include stinging and allergic reactions. Cimzia Pregnancy And Lactation Text: This medication crosses the placenta but can be considered safe in certain situations. Cimzia may be excreted in breast milk. Xolair Counseling:  Patient informed of potential adverse effects including but not limited to fever, muscle aches, rash and allergic reactions.  The patient verbalized understanding of the proper use and possible adverse effects of Xolair.  All of the patient's questions and concerns were addressed. Aklief counseling:  Patient advised to apply a pea-sized amount only at bedtime and wait 30 minutes after washing their face before applying.  If too drying, patient may add a non-comedogenic moisturizer.  The most commonly reported side effects including irritation, redness, scaling, dryness, stinging, burning, itching, and increased risk of sunburn.  The patient verbalized understanding of the proper use and possible adverse effects of retinoids.  All of the patient's questions and concerns were addressed. Glycopyrrolate Counseling:  I discussed with the patient the risks of glycopyrrolate including but not limited to skin rash, drowsiness, dry mouth, difficulty urinating, and blurred vision. Bexarotene Counseling:  I discussed with the patient the risks of bexarotene including but not limited to hair loss, dry lips/skin/eyes, liver abnormalities, hyperlipidemia, pancreatitis, depression/suicidal ideation, photosensitivity, drug rash/allergic reactions, hypothyroidism, anemia, leukopenia, infection, cataracts, and teratogenicity.  Patient understands that they will need regular blood tests to check lipid profile, liver function tests, white blood cell count, thyroid function tests and pregnancy test if applicable. Sski Pregnancy And Lactation Text: This medication is Pregnancy Category D and isn't considered safe during pregnancy. It is excreted in breast milk. Doxycycline Counseling:  Patient counseled regarding possible photosensitivity and increased risk for sunburn.  Patient instructed to avoid sunlight, if possible.  When exposed to sunlight, patients should wear protective clothing, sunglasses, and sunscreen.  The patient was instructed to call the office immediately if the following severe adverse effects occur:  hearing changes, easy bruising/bleeding, severe headache, or vision changes.  The patient verbalized understanding of the proper use and possible adverse effects of doxycycline.  All of the patient's questions and concerns were addressed. Cimetidine Counseling:  I discussed with the patient the risks of Cimetidine including but not limited to gynecomastia, headache, diarrhea, nausea, drowsiness, arrhythmias, pancreatitis, skin rashes, psychosis, bone marrow suppression and kidney toxicity. Topical Steroids Counseling: I discussed with the patient that prolonged use of topical steroids can result in the increased appearance of superficial blood vessels (telangiectasias), lightening (hypopigmentation) and thinning of the skin (atrophy).  Patient understands to avoid using high potency steroids in skin folds, the groin or the face.  The patient verbalized understanding of the proper use and possible adverse effects of topical steroids.  All of the patient's questions and concerns were addressed. Rinvoq Pregnancy And Lactation Text: Based on animal studies, Rinvoq may cause embryo-fetal harm when administered to pregnant women.  The medication should not be used in pregnancy.  Breastfeeding is not recommended during treatment and for 6 days after the last dose. Dutasteride Female Counseling: Dutasteride Counseling:  I discussed with the patient the risks of use of dutasteride including but not limited to decreased libido and sexual dysfunction. Explained the teratogenic nature of the medication and stressed the importance of not getting pregnant during treatment. All of the patient's questions and concerns were addressed. Doxycycline Pregnancy And Lactation Text: This medication is Pregnancy Category D and not consider safe during pregnancy. It is also excreted in breast milk but is considered safe for shorter treatment courses. Prednisone Counseling:  I discussed with the patient the risks of prolonged use of prednisone including but not limited to weight gain, insomnia, osteoporosis, mood changes, diabetes, susceptibility to infection, glaucoma and high blood pressure.  In cases where prednisone use is prolonged, patients should be monitored with blood pressure checks, serum glucose levels and an eye exam.  Additionally, the patient may need to be placed on GI prophylaxis, PCP prophylaxis, and calcium and vitamin D supplementation and/or a bisphosphonate.  The patient verbalized understanding of the proper use and the possible adverse effects of prednisone.  All of the patient's questions and concerns were addressed. Cantharidin Counseling:  I discussed with the patient the risks of Cantharidin including but not limited to pain, redness, burning, itching, and blistering. Qbrexza Counseling:  I discussed with the patient the risks of Qbrexza including but not limited to headache, mydriasis, blurred vision, dry eyes, nasal dryness, dry mouth, dry throat, dry skin, urinary hesitation, and constipation.  Local skin reactions including erythema, burning, stinging, and itching can also occur. Clofazimine Counseling:  I discussed with the patient the risks of clofazimine including but not limited to skin and eye pigmentation, liver damage, nausea/vomiting, gastrointestinal bleeding and allergy. Griseofulvin Counseling:  I discussed with the patient the risks of griseofulvin including but not limited to photosensitivity, cytopenia, liver damage, nausea/vomiting and severe allergy.  The patient understands that this medication is best absorbed when taken with a fatty meal (e.g., ice cream or french fries). Qbrexza Pregnancy And Lactation Text: There is no available data on Qbrexza use in pregnant women.  There is no available data on Qbrexza use in lactation. Thalidomide Counseling: I discussed with the patient the risks of thalidomide including but not limited to birth defects, anxiety, weakness, chest pain, dizziness, cough and severe allergy. Azathioprine Counseling:  I discussed with the patient the risks of azathioprine including but not limited to myelosuppression, immunosuppression, hepatotoxicity, lymphoma, and infections.  The patient understands that monitoring is required including baseline LFTs, Creatinine, possible TPMP genotyping and weekly CBCs for the first month and then every 2 weeks thereafter.  The patient verbalized understanding of the proper use and possible adverse effects of azathioprine.  All of the patient's questions and concerns were addressed. Griseofulvin Pregnancy And Lactation Text: This medication is Pregnancy Category X and is known to cause serious birth defects. It is unknown if this medication is excreted in breast milk but breast feeding should be avoided. Hyrimoz Counseling:  I discussed with the patient the risks of adalimumab including but not limited to myelosuppression, immunosuppression, autoimmune hepatitis, demyelinating diseases, lymphoma, and serious infections.  The patient understands that monitoring is required including a PPD at baseline and must alert us or the primary physician if symptoms of infection or other concerning signs are noted. Low Dose Naltrexone Counseling- I discussed with the patient the potential risks and side effects of low dose naltrexone including but not limited to: more vivid dreams, headaches, nausea, vomiting, abdominal pain, fatigue, dizziness, and anxiety. Topical Steroids Applications Pregnancy And Lactation Text: Most topical steroids are considered safe to use during pregnancy and lactation.  Any topical steroid applied to the breast or nipple should be washed off before breastfeeding. Spevigo Counseling: I discussed with the patient the risks of Spevigo including but not limited to fatigue, nasuea, vomiting, headache, pruritus, urinary tract infection, an infusion related reactions.  The patient understands that monitoring is required including screening for tuberculosis at baseline and yearly screening thereafter while continuing Spevigo therapy. They should contact us if symptoms of infection or other concerning signs are noted. Topical Sulfur Applications Counseling: Topical Sulfur Counseling: Patient counseled that this medication may cause skin irritation or allergic reactions.  In the event of skin irritation, the patient was advised to reduce the amount of the drug applied or use it less frequently.   The patient verbalized understanding of the proper use and possible adverse effects of topical sulfur application.  All of the patient's questions and concerns were addressed. Erythromycin Counseling:  I discussed with the patient the risks of erythromycin including but not limited to GI upset, allergic reaction, drug rash, diarrhea, increase in liver enzymes, and yeast infections. Spevigo Pregnancy And Lactation Text: The risk during pregnancy and breastfeeding is uncertain with this medication. This medication does cross the placenta. It is unknown if this medication is found in breast milk. Low Dose Naltrexone Pregnancy And Lactation Text: Naltrexone is pregnancy category C.  There have been no adequate and well-controlled studies in pregnant women.  It should be used in pregnancy only if the potential benefit justifies the potential risk to the fetus.   Limited data indicates that naltrexone is minimally excreted into breastmilk. Dutasteride Pregnancy And Lactation Text: This medication is absolutely contraindicated in women, especially during pregnancy and breast feeding. Feminization of male fetuses is possible if taking while pregnant. 5-Fu Counseling: 5-Fluorouracil Counseling:  I discussed with the patient the risks of 5-fluorouracil including but not limited to erythema, scaling, itching, weeping, crusting, and pain. Doxepin Counseling:  Patient advised that the medication is sedating and not to drive a car after taking this medication. Patient informed of potential adverse effects including but not limited to dry mouth, urinary retention, and blurry vision.  The patient verbalized understanding of the proper use and possible adverse effects of doxepin.  All of the patient's questions and concerns were addressed. Xeljanz Counseling: I discussed with the patient the risks of Xeljanz therapy including increased risk of infection, liver issues, headache, diarrhea, or cold symptoms. Live vaccines should be avoided. They were instructed to call if they have any problems. Colchicine Counseling:  Patient counseled regarding adverse effects including but not limited to stomach upset (nausea, vomiting, stomach pain, or diarrhea).  Patient instructed to limit alcohol consumption while taking this medication.  Colchicine may reduce blood counts especially with prolonged use.  The patient understands that monitoring of kidney function and blood counts may be required, especially at baseline. The patient verbalized understanding of the proper use and possible adverse effects of colchicine.  All of the patient's questions and concerns were addressed. Finasteride Male Counseling: Finasteride Counseling:  I discussed with the patient the risks of use of finasteride including but not limited to decreased libido, decreased ejaculate volume, gynecomastia, and depression. Women should not handle medication.  All of the patient's questions and concerns were addressed. Itraconazole Counseling:  I discussed with the patient the risks of itraconazole including but not limited to liver damage, nausea/vomiting, neuropathy, and severe allergy.  The patient understands that this medication is best absorbed when taken with acidic beverages such as non-diet cola or ginger ale.  The patient understands that monitoring is required including baseline LFTs and repeat LFTs at intervals.  The patient understands that they are to contact us or the primary physician if concerning signs are noted. Xeljuanz Pregnancy And Lactation Text: This medication is Pregnancy Category D and is not considered safe during pregnancy.  The risk during breast feeding is also uncertain. Erivedge Counseling- I discussed with the patient the risks of Erivedge including but not limited to nausea, vomiting, diarrhea, constipation, weight loss, changes in the sense of taste, decreased appetite, muscle spasms, and hair loss.  The patient verbalized understanding of the proper use and possible adverse effects of Erivedge.  All of the patient's questions and concerns were addressed. Rhofade Counseling: Rhofade is a topical medication which can decrease superficial blood flow where applied. Side effects are uncommon and include stinging, redness and allergic reactions. Erythromycin Pregnancy And Lactation Text: This medication is Pregnancy Category B and is considered safe during pregnancy. It is also excreted in breast milk. Stelara Counseling:  I discussed with the patient the risks of ustekinumab including but not limited to immunosuppression, malignancy, posterior leukoencephalopathy syndrome, and serious infections.  The patient understands that monitoring is required including a PPD at baseline and must alert us or the primary physician if symptoms of infection or other concerning signs are noted. Niacinamide Counseling: I recommended taking niacin or niacinamide, also know as vitamin B3, twice daily. Recent evidence suggests that taking vitamin B3 (500 mg twice daily) can reduce the risk of actinic keratoses and non-melanoma skin cancers. Side effects of vitamin B3 include flushing and headache. Tranexamic Acid Counseling:  Patient advised of the small risk of bleeding problems with tranexamic acid. They were also instructed to call if they developed any nausea, vomiting or diarrhea. All of the patient's questions and concerns were addressed. Doxepin Pregnancy And Lactation Text: This medication is Pregnancy Category C and it isn't known if it is safe during pregnancy. It is also excreted in breast milk and breast feeding isn't recommended. Topical Sulfur Applications Pregnancy And Lactation Text: This medication is Pregnancy Category C and has an unknown safety profile during pregnancy. It is unknown if this topical medication is excreted in breast milk. Ilumya Counseling: I discussed with the patient the risks of tildrakizumab including but not limited to immunosuppression, malignancy, posterior leukoencephalopathy syndrome, and serious infections.  The patient understands that monitoring is required including a PPD at baseline and must alert us or the primary physician if symptoms of infection or other concerning signs are noted. Drysol Counseling:  I discussed with the patient the risks of drysol/aluminum chloride including but not limited to skin rash, itching, irritation, burning. Libtayo Counseling- I discussed with the patient the risks of Libtayo including but not limited to nausea, vomiting, diarrhea, and bone or muscle pain.  The patient verbalized understanding of the proper use and possible adverse effects of Libtayo.  All of the patient's questions and concerns were addressed. Hydroxyzine Counseling: Patient advised that the medication is sedating and not to drive a car after taking this medication.  Patient informed of potential adverse effects including but not limited to dry mouth, urinary retention, and blurry vision.  The patient verbalized understanding of the proper use and possible adverse effects of hydroxyzine.  All of the patient's questions and concerns were addressed. Metronidazole Counseling:  I discussed with the patient the risks of metronidazole including but not limited to seizures, nausea/vomiting, a metallic taste in the mouth, nausea/vomiting and severe allergy. Wartpeel Counseling:  I discussed with the patient the risks of Wartpeel including but not limited to erythema, scaling, itching, weeping, crusting, and pain. Finasteride Female Counseling: Finasteride Counseling:  I discussed with the patient the risks of use of finasteride including but not limited to decreased libido and sexual dysfunction. Explained the teratogenic nature of the medication and stressed the importance of not getting pregnant during treatment. All of the patient's questions and concerns were addressed. Niacinamide Pregnancy And Lactation Text: These medications are considered safe during pregnancy. Adbry Counseling: I discussed with the patient the risks of tralokinumab including but not limited to eye infection and irritation, cold sores, injection site reactions, worsening of asthma, allergic reactions and increased risk of parasitic infection.  Live vaccines should be avoided while taking tralokinumab. The patient understands that monitoring is required and they must alert us or the primary physician if symptoms of infection or other concerning signs are noted.

## 2025-05-19 NOTE — PHYSICAL EXAM
Bucky for pt to schedule sept-nov f/u with Dr. Fischer or AP in [place] office.   [No Acute Distress] : no acute distress [Well Developed] : well developed [Normal Sclera/Conjunctiva] : normal sclera/conjunctiva [EOMI] : extraocular movements intact [Normal Outer Ear/Nose] : the outer ears and nose were normal in appearance [Normal TMs] : both tympanic membranes were normal [No Respiratory Distress] : no respiratory distress  [Clear to Auscultation] : lungs were clear to auscultation bilaterally [Normal Rate] : normal rate  [Regular Rhythm] : with a regular rhythm [No Murmur] : no murmur heard [Soft] : abdomen soft [Non-distended] : non-distended [No HSM] : no HSM [Normal Bowel Sounds] : normal bowel sounds [No Joint Swelling] : no joint swelling [Grossly Normal Strength/Tone] : grossly normal strength/tone [No Rash] : no rash [Coordination Grossly Intact] : coordination grossly intact [Normal Gait] : normal gait [Normal Affect] : the affect was normal [Normal Insight/Judgement] : insight and judgment were intact [de-identified] : mild TTP over L abdomen [de-identified] : +R knee TTP, no swelling noted, full ROM of knees

## (undated) DEVICE — BITE BLOCK ADULT 20 X 27MM (GREEN)

## (undated) DEVICE — TUBING STRYKER HYSTEROSCOPY INFLOW OUTFLOW

## (undated) DEVICE — SUT BIOSYN 4-0 18" P-12

## (undated) DEVICE — IRRIGATOR BIO SHIELD

## (undated) DEVICE — ELCTR GROUNDING PAD ADULT COVIDIEN

## (undated) DEVICE — DRSG OPSITE 2.5 X 2"

## (undated) DEVICE — FOLEY HOLDER STATLOCK 2 WAY ADULT

## (undated) DEVICE — SYR LUER LOK 50CC

## (undated) DEVICE — SENSOR O2 FINGER ADULT

## (undated) DEVICE — DRAPE 3/4 SHEET 52X76"

## (undated) DEVICE — PACK IV START WITH CHG

## (undated) DEVICE — TUBING SUCTION 20FT

## (undated) DEVICE — SPECIMEN CONTAINER 100ML

## (undated) DEVICE — BLADE SCALPEL SAFETYLOCK #10

## (undated) DEVICE — TUBING SUCTION CONN 6FT STERILE

## (undated) DEVICE — WARMING BLANKET UPPER ADULT

## (undated) DEVICE — CATH IV SAFE BC 22G X 1" (BLUE)

## (undated) DEVICE — DRSG OPSITE 13.75 X 4"

## (undated) DEVICE — AVETA FLUID MANAGEMENT ACCESSORY

## (undated) DEVICE — SUT SOFSILK 2-0 18" C-23

## (undated) DEVICE — TUBING IV SET GRAVITY 3Y 100" MACRO

## (undated) DEVICE — BRUSH COLONOSCOPY CYTOLOGY

## (undated) DEVICE — CATH IV SAFE BC 20G X 1.16" (PINK)

## (undated) DEVICE — SYR ALLIANCE II INFLATION 60ML

## (undated) DEVICE — CLAMP BX HOT RAD JAW 3

## (undated) DEVICE — DRAPE CHEST BREAST 106" X 122"

## (undated) DEVICE — DRAIN BLAKE 15FR BARD CHANNEL

## (undated) DEVICE — SOL INJ NS 0.9% 500ML 2 PORT

## (undated) DEVICE — DRAIN JACKSON PRATT 10MM FLAT FULL NO TROCAR

## (undated) DEVICE — POSITIONER FOAM EGG CRATE ULNAR 2PCS (PINK)

## (undated) DEVICE — PACK LITHOTOMY

## (undated) DEVICE — DRSG CURITY GAUZE SPONGE 4 X 4" 12-PLY

## (undated) DEVICE — SOL IRR POUR NS 0.9% 500ML

## (undated) DEVICE — VENODYNE/SCD SLEEVE CALF MEDIUM

## (undated) DEVICE — SUCTION YANKAUER NO CONTROL VENT

## (undated) DEVICE — STAPLER SKIN VISI-STAT 35 WIDE

## (undated) DEVICE — MEDICATION LABELS W MARKER

## (undated) DEVICE — WARMING BLANKET LOWER ADULT

## (undated) DEVICE — SOL IRR POUR H2O 250ML

## (undated) DEVICE — GLV 7 PROTEXIS (WHITE)

## (undated) DEVICE — POLY TRAP ETRAP

## (undated) DEVICE — SENSOR O2 FINGER ADULT 24/CA

## (undated) DEVICE — DRSG TEGADERM 6"X8"

## (undated) DEVICE — FORCEP RADIAL JAW 4 JUMBO 2.8MM 3.2MM 240CM ORANGE DISP

## (undated) DEVICE — SOL IRR GLYCINE 1.5% 3000L

## (undated) DEVICE — DRAPE TOWEL BLUE 17" X 24"

## (undated) DEVICE — SAVI SCOUT HANDPIECE

## (undated) DEVICE — BLADE SCALPEL SAFETYLOCK #15

## (undated) DEVICE — SUT POLYSORB 3-0 30" V-20 UNDYED

## (undated) DEVICE — DRAPE INSTRUMENT POUCH 6.75" X 11"

## (undated) DEVICE — BIOPSY FORCEP RADIAL JAW 4 STANDARD WITH NEEDLE

## (undated) DEVICE — DRAPE MAYO STAND 30"

## (undated) DEVICE — FOLEY TRAY 16FR 5CC LTX UMETER CLOSED

## (undated) DEVICE — AVETA CORAL HYSTEROSCOPE 4.6MM DISP

## (undated) DEVICE — GLV 7.5 PROTEXIS (WHITE)

## (undated) DEVICE — SYR ASEPTO

## (undated) DEVICE — PREP CHLORAPREP HI-LITE ORANGE 26ML

## (undated) DEVICE — LIGASURE SMALL JAW

## (undated) DEVICE — MARKING PEN W RULER

## (undated) DEVICE — BALLOON US ENDO

## (undated) DEVICE — DRSG STERISTRIPS 0.5 X 4"

## (undated) DEVICE — AVETA FLUID MANAGEMENT ACCESSORY W CAP

## (undated) DEVICE — DRAPE LIGHT HANDLE COVER (GREEN)

## (undated) DEVICE — DRAPE 1/2 SHEET 40X57"

## (undated) DEVICE — PACK MAJOR ABDOMINAL WITH LAP

## (undated) DEVICE — DRAIN RESERVOIR FOR JACKSON PRATT 100CC CARDINAL

## (undated) DEVICE — DRAPE LIGHT HANDLE COVER (BLUE)